# Patient Record
Sex: FEMALE | Race: WHITE | Employment: OTHER | URBAN - METROPOLITAN AREA
[De-identification: names, ages, dates, MRNs, and addresses within clinical notes are randomized per-mention and may not be internally consistent; named-entity substitution may affect disease eponyms.]

---

## 2017-01-12 ENCOUNTER — OFFICE VISIT (OUTPATIENT)
Dept: PULMONOLOGY | Age: 72
End: 2017-01-12

## 2017-01-12 VITALS
OXYGEN SATURATION: 95 % | DIASTOLIC BLOOD PRESSURE: 67 MMHG | RESPIRATION RATE: 16 BRPM | TEMPERATURE: 97.7 F | HEIGHT: 63 IN | BODY MASS INDEX: 32.43 KG/M2 | HEART RATE: 83 BPM | SYSTOLIC BLOOD PRESSURE: 110 MMHG | WEIGHT: 183 LBS

## 2017-01-12 DIAGNOSIS — J44.1 COPD WITH ACUTE EXACERBATION (HCC): Primary | ICD-10-CM

## 2017-01-12 DIAGNOSIS — R06.00 DYSPNEA, UNSPECIFIED TYPE: ICD-10-CM

## 2017-01-12 DIAGNOSIS — J40 TRACHEOBRONCHITIS: ICD-10-CM

## 2017-01-12 DIAGNOSIS — J44.9 COPD, VERY SEVERE (HCC): ICD-10-CM

## 2017-01-12 PROCEDURE — 1036F TOBACCO NON-USER: CPT | Performed by: INTERNAL MEDICINE

## 2017-01-12 PROCEDURE — 3017F COLORECTAL CA SCREEN DOC REV: CPT | Performed by: INTERNAL MEDICINE

## 2017-01-12 PROCEDURE — G8598 ASA/ANTIPLAT THER USED: HCPCS | Performed by: INTERNAL MEDICINE

## 2017-01-12 PROCEDURE — 99214 OFFICE O/P EST MOD 30 MIN: CPT | Performed by: INTERNAL MEDICINE

## 2017-01-12 PROCEDURE — 3014F SCREEN MAMMO DOC REV: CPT | Performed by: INTERNAL MEDICINE

## 2017-01-12 PROCEDURE — 1123F ACP DISCUSS/DSCN MKR DOCD: CPT | Performed by: INTERNAL MEDICINE

## 2017-01-12 PROCEDURE — 1090F PRES/ABSN URINE INCON ASSESS: CPT | Performed by: INTERNAL MEDICINE

## 2017-01-12 PROCEDURE — 3023F SPIROM DOC REV: CPT | Performed by: INTERNAL MEDICINE

## 2017-01-12 PROCEDURE — 4040F PNEUMOC VAC/ADMIN/RCVD: CPT | Performed by: INTERNAL MEDICINE

## 2017-01-12 PROCEDURE — G8427 DOCREV CUR MEDS BY ELIG CLIN: HCPCS | Performed by: INTERNAL MEDICINE

## 2017-01-12 PROCEDURE — G8484 FLU IMMUNIZE NO ADMIN: HCPCS | Performed by: INTERNAL MEDICINE

## 2017-01-12 PROCEDURE — G8400 PT W/DXA NO RESULTS DOC: HCPCS | Performed by: INTERNAL MEDICINE

## 2017-01-12 PROCEDURE — G8419 CALC BMI OUT NRM PARAM NOF/U: HCPCS | Performed by: INTERNAL MEDICINE

## 2017-01-12 PROCEDURE — G8926 SPIRO NO PERF OR DOC: HCPCS | Performed by: INTERNAL MEDICINE

## 2017-01-12 RX ORDER — AMOXICILLIN AND CLAVULANATE POTASSIUM 500; 125 MG/1; MG/1
1 TABLET, FILM COATED ORAL 2 TIMES DAILY
COMMUNITY
End: 2017-04-13 | Stop reason: ALTCHOICE

## 2017-01-12 RX ORDER — PREDNISONE 10 MG/1
TABLET ORAL
Qty: 30 TABLET | Refills: 0 | Status: SHIPPED | OUTPATIENT
Start: 2017-01-12 | End: 2017-01-22

## 2017-01-12 RX ORDER — UMECLIDINIUM 62.5 UG/1
AEROSOL, POWDER ORAL DAILY
Status: ON HOLD | COMMUNITY
Start: 2016-12-08 | End: 2020-07-22 | Stop reason: HOSPADM

## 2017-01-12 RX ORDER — PREDNISONE 20 MG/1
40 TABLET ORAL DAILY
Status: ON HOLD | COMMUNITY
End: 2020-07-22 | Stop reason: SDUPTHER

## 2017-01-12 RX ORDER — LEVOFLOXACIN 500 MG/1
500 TABLET, FILM COATED ORAL DAILY
Qty: 8 TABLET | Refills: 0 | Status: SHIPPED | OUTPATIENT
Start: 2017-01-12 | End: 2017-01-20

## 2017-04-13 ENCOUNTER — OFFICE VISIT (OUTPATIENT)
Dept: PULMONOLOGY | Age: 72
End: 2017-04-13

## 2017-04-13 VITALS
DIASTOLIC BLOOD PRESSURE: 80 MMHG | BODY MASS INDEX: 32.78 KG/M2 | TEMPERATURE: 97.7 F | RESPIRATION RATE: 18 BRPM | SYSTOLIC BLOOD PRESSURE: 130 MMHG | HEART RATE: 96 BPM | OXYGEN SATURATION: 94 % | HEIGHT: 63 IN | WEIGHT: 185 LBS

## 2017-04-13 DIAGNOSIS — R06.00 DYSPNEA, UNSPECIFIED TYPE: ICD-10-CM

## 2017-04-13 DIAGNOSIS — J44.1 COPD WITH ACUTE EXACERBATION (HCC): Primary | ICD-10-CM

## 2017-04-13 DIAGNOSIS — J40 TRACHEOBRONCHITIS: ICD-10-CM

## 2017-04-13 PROCEDURE — 3017F COLORECTAL CA SCREEN DOC REV: CPT | Performed by: INTERNAL MEDICINE

## 2017-04-13 PROCEDURE — G8926 SPIRO NO PERF OR DOC: HCPCS | Performed by: INTERNAL MEDICINE

## 2017-04-13 PROCEDURE — 99214 OFFICE O/P EST MOD 30 MIN: CPT | Performed by: INTERNAL MEDICINE

## 2017-04-13 PROCEDURE — 3023F SPIROM DOC REV: CPT | Performed by: INTERNAL MEDICINE

## 2017-04-13 PROCEDURE — 4040F PNEUMOC VAC/ADMIN/RCVD: CPT | Performed by: INTERNAL MEDICINE

## 2017-04-13 PROCEDURE — G8598 ASA/ANTIPLAT THER USED: HCPCS | Performed by: INTERNAL MEDICINE

## 2017-04-13 PROCEDURE — 3014F SCREEN MAMMO DOC REV: CPT | Performed by: INTERNAL MEDICINE

## 2017-04-13 PROCEDURE — 1123F ACP DISCUSS/DSCN MKR DOCD: CPT | Performed by: INTERNAL MEDICINE

## 2017-04-13 PROCEDURE — G8427 DOCREV CUR MEDS BY ELIG CLIN: HCPCS | Performed by: INTERNAL MEDICINE

## 2017-04-13 PROCEDURE — 1036F TOBACCO NON-USER: CPT | Performed by: INTERNAL MEDICINE

## 2017-04-13 PROCEDURE — G8417 CALC BMI ABV UP PARAM F/U: HCPCS | Performed by: INTERNAL MEDICINE

## 2017-04-13 PROCEDURE — 1090F PRES/ABSN URINE INCON ASSESS: CPT | Performed by: INTERNAL MEDICINE

## 2017-04-13 PROCEDURE — G8400 PT W/DXA NO RESULTS DOC: HCPCS | Performed by: INTERNAL MEDICINE

## 2017-04-13 RX ORDER — PREDNISONE 10 MG/1
TABLET ORAL
Qty: 30 TABLET | Refills: 0 | Status: SHIPPED | OUTPATIENT
Start: 2017-04-13 | End: 2017-04-23

## 2017-04-13 RX ORDER — DOXYCYCLINE 100 MG/1
100 CAPSULE ORAL 2 TIMES DAILY
Qty: 10 CAPSULE | Refills: 0 | Status: SHIPPED | OUTPATIENT
Start: 2017-04-13 | End: 2017-04-18

## 2017-04-13 RX ORDER — FLUTICASONE FUROATE AND VILANTEROL 200; 25 UG/1; UG/1
POWDER RESPIRATORY (INHALATION)
Qty: 1 EACH | Refills: 5 | Status: SHIPPED | OUTPATIENT
Start: 2017-04-13 | End: 2017-11-19 | Stop reason: SDUPTHER

## 2017-04-13 RX ORDER — IPRATROPIUM BROMIDE AND ALBUTEROL SULFATE 2.5; .5 MG/3ML; MG/3ML
1 SOLUTION RESPIRATORY (INHALATION) EVERY 4 HOURS PRN
Status: ON HOLD | COMMUNITY
End: 2020-07-22 | Stop reason: SDUPTHER

## 2017-10-19 ENCOUNTER — TELEPHONE (OUTPATIENT)
Dept: PULMONOLOGY | Age: 72
End: 2017-10-19

## 2017-11-07 ENCOUNTER — TELEPHONE (OUTPATIENT)
Dept: PULMONOLOGY | Age: 72
End: 2017-11-07

## 2018-01-03 RX ORDER — FLUTICASONE FUROATE AND VILANTEROL 200; 25 UG/1; UG/1
POWDER RESPIRATORY (INHALATION)
Qty: 3 EACH | Refills: 0 | Status: SHIPPED | OUTPATIENT
Start: 2018-01-03

## 2020-07-09 ENCOUNTER — APPOINTMENT (OUTPATIENT)
Dept: GENERAL RADIOLOGY | Age: 75
DRG: 885 | End: 2020-07-09
Payer: MEDICARE

## 2020-07-09 ENCOUNTER — APPOINTMENT (OUTPATIENT)
Dept: CT IMAGING | Age: 75
DRG: 885 | End: 2020-07-09
Payer: MEDICARE

## 2020-07-09 ENCOUNTER — HOSPITAL ENCOUNTER (INPATIENT)
Age: 75
LOS: 13 days | Discharge: NURSING, REHAB OR LONG-TERM CARE | DRG: 885 | End: 2020-07-22
Attending: EMERGENCY MEDICINE | Admitting: PSYCHIATRY & NEUROLOGY
Payer: MEDICARE

## 2020-07-09 PROBLEM — F32.9 MDD (MAJOR DEPRESSIVE DISORDER), SINGLE EPISODE: Status: ACTIVE | Noted: 2020-07-09

## 2020-07-09 LAB
A/G RATIO: 1.4 (ref 1.1–2.2)
ACETAMINOPHEN LEVEL: <5 UG/ML (ref 10–30)
ALBUMIN SERPL-MCNC: 4 G/DL (ref 3.4–5)
ALP BLD-CCNC: 87 U/L (ref 40–129)
ALT SERPL-CCNC: 21 U/L (ref 10–40)
AMMONIA: 33 UMOL/L (ref 11–51)
AMPHETAMINE SCREEN, URINE: ABNORMAL
ANION GAP SERPL CALCULATED.3IONS-SCNC: 16 MMOL/L (ref 3–16)
AST SERPL-CCNC: 24 U/L (ref 15–37)
BARBITURATE SCREEN URINE: ABNORMAL
BASOPHILS ABSOLUTE: 0.1 K/UL (ref 0–0.2)
BASOPHILS RELATIVE PERCENT: 1.1 %
BENZODIAZEPINE SCREEN, URINE: ABNORMAL
BILIRUB SERPL-MCNC: 0.3 MG/DL (ref 0–1)
BILIRUBIN URINE: NEGATIVE
BLOOD, URINE: NEGATIVE
BUN BLDV-MCNC: 13 MG/DL (ref 7–20)
CALCIUM SERPL-MCNC: 9.4 MG/DL (ref 8.3–10.6)
CANNABINOID SCREEN URINE: ABNORMAL
CHLORIDE BLD-SCNC: 93 MMOL/L (ref 99–110)
CLARITY: CLEAR
CO2: 34 MMOL/L (ref 21–32)
COCAINE METABOLITE SCREEN URINE: ABNORMAL
COLOR: YELLOW
CREAT SERPL-MCNC: 0.8 MG/DL (ref 0.6–1.2)
EOSINOPHILS ABSOLUTE: 0.2 K/UL (ref 0–0.6)
EOSINOPHILS RELATIVE PERCENT: 3 %
ETHANOL: NORMAL MG/DL (ref 0–0.08)
GFR AFRICAN AMERICAN: >60
GFR NON-AFRICAN AMERICAN: >60
GLOBULIN: 2.8 G/DL
GLUCOSE BLD-MCNC: 133 MG/DL (ref 70–99)
GLUCOSE URINE: NEGATIVE MG/DL
HCT VFR BLD CALC: 42.1 % (ref 36–48)
HEMOGLOBIN: 13.8 G/DL (ref 12–16)
KETONES, URINE: NEGATIVE MG/DL
LEUKOCYTE ESTERASE, URINE: NEGATIVE
LYMPHOCYTES ABSOLUTE: 2.6 K/UL (ref 1–5.1)
LYMPHOCYTES RELATIVE PERCENT: 36.5 %
Lab: ABNORMAL
MCH RBC QN AUTO: 28.4 PG (ref 26–34)
MCHC RBC AUTO-ENTMCNC: 32.7 G/DL (ref 31–36)
MCV RBC AUTO: 86.8 FL (ref 80–100)
METHADONE SCREEN, URINE: ABNORMAL
MICROSCOPIC EXAMINATION: NORMAL
MONOCYTES ABSOLUTE: 0.8 K/UL (ref 0–1.3)
MONOCYTES RELATIVE PERCENT: 11.1 %
NEUTROPHILS ABSOLUTE: 3.4 K/UL (ref 1.7–7.7)
NEUTROPHILS RELATIVE PERCENT: 48.3 %
NITRITE, URINE: NEGATIVE
OPIATE SCREEN URINE: POSITIVE
OXYCODONE URINE: ABNORMAL
PDW BLD-RTO: 14.2 % (ref 12.4–15.4)
PH UA: 5.5
PH UA: 5.5 (ref 5–8)
PHENCYCLIDINE SCREEN URINE: ABNORMAL
PLATELET # BLD: 377 K/UL (ref 135–450)
PMV BLD AUTO: 7 FL (ref 5–10.5)
POTASSIUM REFLEX MAGNESIUM: 4.5 MMOL/L (ref 3.5–5.1)
PROPOXYPHENE SCREEN: ABNORMAL
PROTEIN UA: NEGATIVE MG/DL
RBC # BLD: 4.85 M/UL (ref 4–5.2)
SALICYLATE, SERUM: <0.3 MG/DL (ref 15–30)
SODIUM BLD-SCNC: 143 MMOL/L (ref 136–145)
SPECIFIC GRAVITY UA: 1.01 (ref 1–1.03)
TOTAL PROTEIN: 6.8 G/DL (ref 6.4–8.2)
URINE REFLEX TO CULTURE: NORMAL
URINE TYPE: NORMAL
UROBILINOGEN, URINE: 0.2 E.U./DL
VALPROIC ACID LEVEL: <2.8 UG/ML (ref 50–100)
WBC # BLD: 7.2 K/UL (ref 4–11)

## 2020-07-09 PROCEDURE — 80198 ASSAY OF THEOPHYLLINE: CPT

## 2020-07-09 PROCEDURE — 82140 ASSAY OF AMMONIA: CPT

## 2020-07-09 PROCEDURE — 84443 ASSAY THYROID STIM HORMONE: CPT

## 2020-07-09 PROCEDURE — 36415 COLL VENOUS BLD VENIPUNCTURE: CPT

## 2020-07-09 PROCEDURE — G0480 DRUG TEST DEF 1-7 CLASSES: HCPCS

## 2020-07-09 PROCEDURE — 80164 ASSAY DIPROPYLACETIC ACD TOT: CPT

## 2020-07-09 PROCEDURE — 99285 EMERGENCY DEPT VISIT HI MDM: CPT

## 2020-07-09 PROCEDURE — U0002 COVID-19 LAB TEST NON-CDC: HCPCS

## 2020-07-09 PROCEDURE — 1240000000 HC EMOTIONAL WELLNESS R&B

## 2020-07-09 PROCEDURE — 71045 X-RAY EXAM CHEST 1 VIEW: CPT

## 2020-07-09 PROCEDURE — 85025 COMPLETE CBC W/AUTO DIFF WBC: CPT

## 2020-07-09 PROCEDURE — 81003 URINALYSIS AUTO W/O SCOPE: CPT

## 2020-07-09 PROCEDURE — 80053 COMPREHEN METABOLIC PANEL: CPT

## 2020-07-09 PROCEDURE — 80307 DRUG TEST PRSMV CHEM ANLYZR: CPT

## 2020-07-09 PROCEDURE — 70450 CT HEAD/BRAIN W/O DYE: CPT

## 2020-07-09 PROCEDURE — 6370000000 HC RX 637 (ALT 250 FOR IP): Performed by: PHYSICIAN ASSISTANT

## 2020-07-09 PROCEDURE — 93005 ELECTROCARDIOGRAM TRACING: CPT | Performed by: PHYSICIAN ASSISTANT

## 2020-07-09 RX ORDER — HYDROCODONE BITARTRATE AND ACETAMINOPHEN 5; 325 MG/1; MG/1
1 TABLET ORAL ONCE
Status: COMPLETED | OUTPATIENT
Start: 2020-07-09 | End: 2020-07-09

## 2020-07-09 RX ORDER — DULOXETIN HYDROCHLORIDE 30 MG/1
30 CAPSULE, DELAYED RELEASE ORAL NIGHTLY
Status: ON HOLD | COMMUNITY
End: 2020-07-22 | Stop reason: HOSPADM

## 2020-07-09 RX ORDER — ERYTHROMYCIN 250 MG/1
250 TABLET, DELAYED RELEASE ORAL NIGHTLY
Status: ON HOLD | COMMUNITY
End: 2020-07-22 | Stop reason: SDUPTHER

## 2020-07-09 RX ADMIN — HYDROCODONE BITARTRATE AND ACETAMINOPHEN 1 TABLET: 5; 325 TABLET ORAL at 20:03

## 2020-07-09 ASSESSMENT — PAIN SCALES - GENERAL: PAINLEVEL_OUTOF10: 8

## 2020-07-09 NOTE — ED NOTES
[address] [facility] to make sure they were consulting Mirella Mittal     [facility]  07/09/20 1804       Ruthie Chakraborty  07/09/20 7632

## 2020-07-09 NOTE — ED PROVIDER NOTES
on labs or imaging. She states she has chronic chest discomfort although no new changes today and no significant EKG findings. Story not suggestive of acute coronary syndrome or pulmonary embolism. At this time, she would benefit from further evaluation with psychiatry due to concern for severe depression. She was medically cleared. She was admitted to geriatric psychiatry.      Michelle Lucio MD  07/10/20 0035

## 2020-07-09 NOTE — ED NOTES
Pt off unit to radiology via stretcher. Dietary notified for dinner tray.  Michele Cancino RN  07/09/20 7135

## 2020-07-09 NOTE — ED PROVIDER NOTES
Department of Emergency Medicine   ED  Provider Note  Admit Date/RoomTime: 7/9/2020  5:09 PM  ED Room: YONI/NONE  Chief Complaint   Altered Mental Status (increased confusion x 6 months. Pt currently hospice services lives alone states she doesnt want to live anymore but does not want to harm self)    History of Present Illness   Source of history provided by:  patient and caregiver / friend. History/Exam Limitations: mental status. German Shah is a 70 y.o. old female who has a past medical history of:   Past Medical History:   Diagnosis Date    Arthritis     lower back, shoulder blades    Bipolar depression ([address]     CAD (coronary artery disease)     CHF (congestive heart failure) ([address]     COPD (chronic obstructive pulmonary disease) ([address]     Depression     Diabetes mellitus ([address]     Hyperlipidemia     Pneumonia     Thyroid disease     hypothyroidism    Unspecified cerebral artery occlusion with cerebral infarction     2006    presents to the emergency department via EMS for evaluation of a change in mental status patient has had increasing confusion including visual hallucinations, seeing a little boy named Yobani, chewing on oxygen tubing, chewing on her pill bottles, however resists taking her medications, because they \"make her sick\" for the past 6 months duration patient also has suicidal ideations but no suicidal plan. H/o self harm attempts, with past admission to [facility] facility. Patient has end-stage COPD and is on 3 L nasal cannula at baseline. She is a hospice patient, per report of the nurse who called report, unable to find further documentation of this. Pt lives alone, her sister lives next door. Quality:      Hopelessness:   Yes. Terror:   No.     Confusion:   Yes. Hallucinations:   visual  and auditory.      Able to care for self: No.  Able to control self: No.    ROS    Pertinent positives and negatives are stated within HPI, all other systems reviewed and are noted.  Neurological:  Oriented. Motor functions intact. Psychiatric:        Thought Process:       Coherent:  No.        Delusions / Paranoia: +. Flight of ideas:  No.         Rambling conversation:  Yes. Affect: sad , labile and tearful. Suicidal ideation:  suicidal ideation with unclear plan no intent. Homicidal ideation:  no homicidal ideation. Perceptions:  auditory and visual present. Insight: below average. Judgement: below average.     Lab / Imaging Results   (All laboratory and radiology results have been personally reviewed by myself)  Labs:  Results for orders placed or performed during the hospital encounter of 07/09/20   CBC Auto Differential   Result Value Ref Range    WBC 7.2 4.0 - 11.0 K/uL    RBC 4.85 4.00 - 5.20 M/uL    Hemoglobin 13.8 12.0 - 16.0 g/dL    Hematocrit 42.1 36.0 - 48.0 %    MCV 86.8 80.0 - 100.0 fL    MCH 28.4 26.0 - 34.0 pg    MCHC 32.7 31.0 - 36.0 g/dL    RDW 14.2 12.4 - 15.4 %    Platelets 328 282 - 876 K/uL    MPV 7.0 5.0 - 10.5 fL    Neutrophils % 48.3 %    Lymphocytes % 36.5 %    Monocytes % 11.1 %    Eosinophils % 3.0 %    Basophils % 1.1 %    Neutrophils Absolute 3.4 1.7 - 7.7 K/uL    Lymphocytes Absolute 2.6 1.0 - 5.1 K/uL    Monocytes Absolute 0.8 0.0 - 1.3 K/uL    Eosinophils Absolute 0.2 0.0 - 0.6 K/uL    Basophils Absolute 0.1 0.0 - 0.2 K/uL   Comprehensive Metabolic Panel w/ Reflex to MG   Result Value Ref Range    Sodium 143 136 - 145 mmol/L    Potassium reflex Magnesium 4.5 3.5 - 5.1 mmol/L    Chloride 93 (L) 99 - 110 mmol/L    CO2 34 (H) 21 - 32 mmol/L    Anion Gap 16 3 - 16    Glucose 133 (H) 70 - 99 mg/dL    BUN 13 7 - 20 mg/dL    CREATININE 0.8 0.6 - 1.2 mg/dL    GFR Non-African American >60 >60    GFR African American >60 >60    Calcium 9.4 8.3 - 10.6 mg/dL    Total Protein 6.8 6.4 - 8.2 g/dL    Alb 4.0 3.4 - 5.0 g/dL    Albumin/Globulin Ratio 1.4 1.1 - 2.2    Total Bilirubin 0.3 0.0 - 1.0 mg/dL    Alkaline degrees    T Axis 80 degrees    Diagnosis       Sinus tachycardiaOtherwise normal ECGWhen compared with ECG of 19-JUL-2016 05:43,No significant change was found     Imaging: All Radiology results interpreted by Radiologist unless otherwise noted. XR CHEST PORTABLE   Final Result   No acute cardiopulmonary disease. CT Head WO Contrast   Final Result   No acute intracranial abnormality. ED Course / Medical Decision Making     Medications   HYDROcodone-acetaminophen (NORCO) 5-325 MG per tablet 1 tablet (1 tablet Oral Given 7/9/20 2003)     ED Course as of Jul 10 0027   Thu Jul 09, 2020   1842 Notified, [facility]    [AR]   1914 Patient's labs returned urine drug screen is positive for opiates which patient is prescribed. Urine shows no evidence of infection. There is a no evidence of alcohol cyst in the late acetaminophen. Patient CO2 was elevated at 34 however likely due to chronic COPD. Notified [facility] patient was medically clear    [AR]      ED Course User Index  [AR] Juju Luna PA-C   Consult(s):   I    MDM:   I have performed a medical clearance examination on this patient. It is my opinion that no medical conditions were discovered that would preclude admission to a behavioral health unit or discharge home. I feel that the patient is medically stable for disposition by the behavioral health team at this time. Counseling:    ED Course as of Jul 10 0027   Thu Jul 09, 2020   1842 Notified, [facility]    [AR]   1914 Patient's labs returned urine drug screen is positive for opiates which patient is prescribed. Urine shows no evidence of infection. There is a no evidence of alcohol cyst in the late acetaminophen. Patient CO2 was elevated at 34 however likely due to chronic COPD.   Notified [facility] patient was medically clear    [AR]      ED Course User Index  [AR] Juju Luna PA-C      The emergency provider has spoken with the patient and discussed todays results, in addition to providing specific details for the plan of care and counseling regarding the diagnosis and prognosis. Questions are answered at this time and they are agreeable with the plan. Assessment      1. Hallucinations    2. Tearfulness    3. Chronic bilateral low back pain without sciatica    4. Depression, unspecified depression type      Plan   Admit to mental health unit - medically cleared for admission  Patient condition is stable    New Medications     New Prescriptions    No medications on file     Electronically signed by Suly Campo PA-C   DD: 7/9/20  **This report was transcribed using voice recognition software. Every effort was made to ensure accuracy; however, inadvertent computerized transcription errors may be present.   END OF ED PROVIDER NOTE        Suly Campo PA-C  07/10/20 0575

## 2020-07-10 LAB
BASE EXCESS ARTERIAL: 6.4 MMOL/L (ref -3–3)
CARBOXYHEMOGLOBIN ARTERIAL: 0.3 % (ref 0–1.5)
EKG ATRIAL RATE: 101 BPM
EKG DIAGNOSIS: NORMAL
EKG P AXIS: 78 DEGREES
EKG P-R INTERVAL: 156 MS
EKG Q-T INTERVAL: 354 MS
EKG QRS DURATION: 74 MS
EKG QTC CALCULATION (BAZETT): 459 MS
EKG R AXIS: 35 DEGREES
EKG T AXIS: 80 DEGREES
EKG VENTRICULAR RATE: 101 BPM
HCO3 ARTERIAL: 32.7 MMOL/L (ref 21–29)
HEMOGLOBIN, ART, EXTENDED: 14.1 G/DL (ref 12–16)
METHEMOGLOBIN ARTERIAL: 0.3 %
O2 CONTENT ARTERIAL: 20 ML/DL
O2 SAT, ARTERIAL: 97.9 %
O2 THERAPY: ABNORMAL
PCO2 ARTERIAL: 53.8 MMHG (ref 35–45)
PH ARTERIAL: 7.4 (ref 7.35–7.45)
PO2 ARTERIAL: 109.9 MMHG (ref 75–108)
SARS-COV-2, NAAT: NOT DETECTED
TCO2 ARTERIAL: 34.4 MMOL/L
THEOPHYLLINE LEVEL: <0.8 UG/ML (ref 8–20)
TSH SERPL DL<=0.05 MIU/L-ACNC: 2.42 UIU/ML (ref 0.27–4.2)
VALPROIC ACID LEVEL: <2.8 UG/ML (ref 50–100)

## 2020-07-10 PROCEDURE — 6370000000 HC RX 637 (ALT 250 FOR IP): Performed by: NURSE PRACTITIONER

## 2020-07-10 PROCEDURE — 36600 WITHDRAWAL OF ARTERIAL BLOOD: CPT

## 2020-07-10 PROCEDURE — 99223 1ST HOSP IP/OBS HIGH 75: CPT | Performed by: PSYCHIATRY & NEUROLOGY

## 2020-07-10 PROCEDURE — 93010 ELECTROCARDIOGRAM REPORT: CPT | Performed by: INTERNAL MEDICINE

## 2020-07-10 PROCEDURE — 6370000000 HC RX 637 (ALT 250 FOR IP): Performed by: PSYCHIATRY & NEUROLOGY

## 2020-07-10 PROCEDURE — 82803 BLOOD GASES ANY COMBINATION: CPT

## 2020-07-10 PROCEDURE — 99223 1ST HOSP IP/OBS HIGH 75: CPT | Performed by: PHYSICIAN ASSISTANT

## 2020-07-10 PROCEDURE — 1240000000 HC EMOTIONAL WELLNESS R&B

## 2020-07-10 RX ORDER — MAGNESIUM HYDROXIDE/ALUMINUM HYDROXICE/SIMETHICONE 120; 1200; 1200 MG/30ML; MG/30ML; MG/30ML
30 SUSPENSION ORAL EVERY 6 HOURS PRN
Status: DISCONTINUED | OUTPATIENT
Start: 2020-07-10 | End: 2020-07-22 | Stop reason: HOSPADM

## 2020-07-10 RX ORDER — DIVALPROEX SODIUM 250 MG/1
250 TABLET, DELAYED RELEASE ORAL 2 TIMES DAILY
Status: DISCONTINUED | OUTPATIENT
Start: 2020-07-10 | End: 2020-07-22 | Stop reason: HOSPADM

## 2020-07-10 RX ORDER — POTASSIUM CHLORIDE 750 MG/1
10 TABLET, EXTENDED RELEASE ORAL
Status: DISCONTINUED | OUTPATIENT
Start: 2020-07-11 | End: 2020-07-22 | Stop reason: HOSPADM

## 2020-07-10 RX ORDER — ERYTHROMYCIN 250 MG/1
500 TABLET, COATED ORAL NIGHTLY
Status: DISCONTINUED | OUTPATIENT
Start: 2020-07-10 | End: 2020-07-22 | Stop reason: HOSPADM

## 2020-07-10 RX ORDER — HYDROCODONE BITARTRATE AND ACETAMINOPHEN 10; 325 MG/1; MG/1
1 TABLET ORAL EVERY 6 HOURS PRN
Status: DISCONTINUED | OUTPATIENT
Start: 2020-07-10 | End: 2020-07-22 | Stop reason: HOSPADM

## 2020-07-10 RX ORDER — CLOPIDOGREL BISULFATE 75 MG/1
75 TABLET ORAL DAILY
Status: DISCONTINUED | OUTPATIENT
Start: 2020-07-10 | End: 2020-07-22 | Stop reason: HOSPADM

## 2020-07-10 RX ORDER — DOCUSATE SODIUM 100 MG/1
100 CAPSULE, LIQUID FILLED ORAL 2 TIMES DAILY
Status: DISCONTINUED | OUTPATIENT
Start: 2020-07-10 | End: 2020-07-22 | Stop reason: HOSPADM

## 2020-07-10 RX ORDER — HALOPERIDOL 5 MG/ML
2 INJECTION INTRAMUSCULAR EVERY 6 HOURS PRN
Status: DISCONTINUED | OUTPATIENT
Start: 2020-07-10 | End: 2020-07-22 | Stop reason: HOSPADM

## 2020-07-10 RX ORDER — LEVOTHYROXINE SODIUM 0.12 MG/1
125 TABLET ORAL DAILY
Status: DISCONTINUED | OUTPATIENT
Start: 2020-07-10 | End: 2020-07-22 | Stop reason: HOSPADM

## 2020-07-10 RX ORDER — LORAZEPAM 0.5 MG/1
0.5 TABLET ORAL EVERY 8 HOURS PRN
Status: DISCONTINUED | OUTPATIENT
Start: 2020-07-10 | End: 2020-07-22 | Stop reason: HOSPADM

## 2020-07-10 RX ORDER — QUETIAPINE FUMARATE 100 MG/1
100 TABLET, FILM COATED ORAL NIGHTLY
Status: DISCONTINUED | OUTPATIENT
Start: 2020-07-10 | End: 2020-07-13

## 2020-07-10 RX ORDER — COLCHICINE 0.6 MG/1
0.6 TABLET ORAL 2 TIMES DAILY
Status: ON HOLD | COMMUNITY
End: 2020-07-22 | Stop reason: HOSPADM

## 2020-07-10 RX ORDER — BUDESONIDE AND FORMOTEROL FUMARATE DIHYDRATE 160; 4.5 UG/1; UG/1
2 AEROSOL RESPIRATORY (INHALATION) 2 TIMES DAILY
Status: DISCONTINUED | OUTPATIENT
Start: 2020-07-10 | End: 2020-07-22 | Stop reason: HOSPADM

## 2020-07-10 RX ORDER — LORAZEPAM 2 MG/ML
1 INJECTION INTRAMUSCULAR EVERY 6 HOURS PRN
Status: DISCONTINUED | OUTPATIENT
Start: 2020-07-10 | End: 2020-07-10

## 2020-07-10 RX ORDER — LORAZEPAM 0.5 MG/1
0.5 TABLET ORAL EVERY 6 HOURS PRN
Status: DISCONTINUED | OUTPATIENT
Start: 2020-07-10 | End: 2020-07-10

## 2020-07-10 RX ORDER — PANTOPRAZOLE SODIUM 40 MG/1
40 TABLET, DELAYED RELEASE ORAL
Status: DISCONTINUED | OUTPATIENT
Start: 2020-07-11 | End: 2020-07-22 | Stop reason: HOSPADM

## 2020-07-10 RX ORDER — ACETAMINOPHEN 325 MG/1
650 TABLET ORAL EVERY 4 HOURS PRN
Status: DISCONTINUED | OUTPATIENT
Start: 2020-07-10 | End: 2020-07-22 | Stop reason: HOSPADM

## 2020-07-10 RX ORDER — FUROSEMIDE 20 MG/1
20 TABLET ORAL DAILY
Status: DISCONTINUED | OUTPATIENT
Start: 2020-07-10 | End: 2020-07-17

## 2020-07-10 RX ORDER — ALBUTEROL SULFATE 90 UG/1
2 AEROSOL, METERED RESPIRATORY (INHALATION) EVERY 6 HOURS PRN
Status: DISCONTINUED | OUTPATIENT
Start: 2020-07-10 | End: 2020-07-11

## 2020-07-10 RX ORDER — TRAZODONE HYDROCHLORIDE 50 MG/1
25 TABLET ORAL NIGHTLY PRN
Status: DISCONTINUED | OUTPATIENT
Start: 2020-07-10 | End: 2020-07-17

## 2020-07-10 RX ORDER — BENZTROPINE MESYLATE 1 MG/ML
1 INJECTION INTRAMUSCULAR; INTRAVENOUS 2 TIMES DAILY PRN
Status: DISCONTINUED | OUTPATIENT
Start: 2020-07-10 | End: 2020-07-22 | Stop reason: HOSPADM

## 2020-07-10 RX ORDER — LORAZEPAM 1 MG/1
1 TABLET ORAL EVERY 8 HOURS PRN
Status: ON HOLD | COMMUNITY
End: 2020-07-22 | Stop reason: SDUPTHER

## 2020-07-10 RX ORDER — PREDNISONE 1 MG/1
7.5 TABLET ORAL DAILY
Status: DISCONTINUED | OUTPATIENT
Start: 2020-07-10 | End: 2020-07-22 | Stop reason: HOSPADM

## 2020-07-10 RX ORDER — HALOPERIDOL 1 MG/1
2 TABLET ORAL EVERY 6 HOURS PRN
Status: DISCONTINUED | OUTPATIENT
Start: 2020-07-10 | End: 2020-07-22 | Stop reason: HOSPADM

## 2020-07-10 RX ADMIN — CLOPIDOGREL BISULFATE 75 MG: 75 TABLET ORAL at 15:35

## 2020-07-10 RX ADMIN — FUROSEMIDE 20 MG: 20 TABLET ORAL at 15:35

## 2020-07-10 RX ADMIN — LORAZEPAM 0.5 MG: 0.5 TABLET ORAL at 05:03

## 2020-07-10 RX ADMIN — TRAZODONE HYDROCHLORIDE 25 MG: 50 TABLET ORAL at 02:39

## 2020-07-10 RX ADMIN — DIVALPROEX SODIUM 250 MG: 250 TABLET, DELAYED RELEASE ORAL at 20:21

## 2020-07-10 RX ADMIN — HYDROCODONE BITARTRATE AND ACETAMINOPHEN 1 TABLET: 10; 325 TABLET ORAL at 15:34

## 2020-07-10 RX ADMIN — DIVALPROEX SODIUM 250 MG: 250 TABLET, DELAYED RELEASE ORAL at 15:35

## 2020-07-10 RX ADMIN — PREDNISONE 7.5 MG: 5 TABLET ORAL at 17:12

## 2020-07-10 RX ADMIN — TRAZODONE HYDROCHLORIDE 25 MG: 50 TABLET ORAL at 20:21

## 2020-07-10 RX ADMIN — QUETIAPINE FUMARATE 100 MG: 100 TABLET ORAL at 20:21

## 2020-07-10 RX ADMIN — ERYTHROMYCIN 500 MG: 250 TABLET, FILM COATED ORAL at 21:00

## 2020-07-10 RX ADMIN — DOCUSATE SODIUM 100 MG: 100 CAPSULE, LIQUID FILLED ORAL at 20:21

## 2020-07-10 RX ADMIN — ACETAMINOPHEN 650 MG: 325 TABLET ORAL at 20:21

## 2020-07-10 RX ADMIN — DOCUSATE SODIUM 100 MG: 100 CAPSULE, LIQUID FILLED ORAL at 15:35

## 2020-07-10 RX ADMIN — LEVOTHYROXINE SODIUM 125 MCG: 125 TABLET ORAL at 15:35

## 2020-07-10 RX ADMIN — ACETAMINOPHEN 650 MG: 325 TABLET ORAL at 05:02

## 2020-07-10 RX ADMIN — HALOPERIDOL 2 MG: 1 TABLET ORAL at 20:26

## 2020-07-10 ASSESSMENT — LIFESTYLE VARIABLES: HISTORY_ALCOHOL_USE: COMMENT

## 2020-07-10 ASSESSMENT — SLEEP AND FATIGUE QUESTIONNAIRES
RESTFUL SLEEP: NO
DIFFICULTY FALLING ASLEEP: YES
DO YOU HAVE DIFFICULTY SLEEPING: YES
DIFFICULTY STAYING ASLEEP: YES
DO YOU USE A SLEEP AID: YES
SLEEP PATTERN: DIFFICULTY FALLING ASLEEP;DISTURBED/INTERRUPTED SLEEP;INSOMNIA;RESTLESSNESS;DIFFICULTY ARISING
DIFFICULTY ARISING: YES

## 2020-07-10 ASSESSMENT — PAIN DESCRIPTION - LOCATION
LOCATION: BACK
LOCATION: BACK

## 2020-07-10 ASSESSMENT — PAIN DESCRIPTION - PAIN TYPE
TYPE: CHRONIC PAIN
TYPE: CHRONIC PAIN

## 2020-07-10 ASSESSMENT — PAIN SCALES - GENERAL
PAINLEVEL_OUTOF10: 0
PAINLEVEL_OUTOF10: 8
PAINLEVEL_OUTOF10: 3
PAINLEVEL_OUTOF10: 0
PAINLEVEL_OUTOF10: 7
PAINLEVEL_OUTOF10: 8
PAINLEVEL_OUTOF10: 8
PAINLEVEL_OUTOF10: 0
PAINLEVEL_OUTOF10: 3
PAINLEVEL_OUTOF10: 5

## 2020-07-10 ASSESSMENT — PAIN DESCRIPTION - ONSET
ONSET: ON-GOING
ONSET: ON-GOING

## 2020-07-10 ASSESSMENT — PAIN DESCRIPTION - FREQUENCY
FREQUENCY: INTERMITTENT
FREQUENCY: INTERMITTENT

## 2020-07-10 ASSESSMENT — PAIN DESCRIPTION - PROGRESSION
CLINICAL_PROGRESSION: GRADUALLY WORSENING
CLINICAL_PROGRESSION: GRADUALLY WORSENING

## 2020-07-10 ASSESSMENT — PAIN DESCRIPTION - DESCRIPTORS
DESCRIPTORS: CONSTANT
DESCRIPTORS: ACHING
DESCRIPTORS: ACHING

## 2020-07-10 ASSESSMENT — PAIN DESCRIPTION - ORIENTATION
ORIENTATION: LOWER
ORIENTATION: LOWER

## 2020-07-10 ASSESSMENT — PAIN - FUNCTIONAL ASSESSMENT: PAIN_FUNCTIONAL_ASSESSMENT: 0-10

## 2020-07-10 NOTE — ED NOTES
Presenting Problem: Depression    Appearance/Hygiene:  well-appearing, street clothes, seated in bed, fair grooming and fair hygiene   Motor Behavior: WNL   Attitude: Labile  Affect: depressed affect, anxious affect; labile  Speech: increased latency of response, pressured and expressive aphasia  Mood: anxious, constricted, depressed, labile and sad   Thought Processes: Blocking and Illogical  Perceptions: Present - States that she sees things and hears things at times, however, she is unwilling/unable to be more specific than this. Thought content: Patient is having great difficulty completing a thought and answer a question accurately. Becomes tearful and then will be unwilling/unable to speak for a few seconds. At times she is clear and able to answer a questions. She is unwilling/unable to state what we can do to help her the most at this time. States at times she wants to die because she is tired. Does not have a plan to end life, however, she is very depressed and sad. Feels hopeless and helpless in her current situation. Suicidal ideation:  no specific plan to harm self   Homicidal ideation:  none  Orientation: A&Ox2  Disoriented to date and situation. Memory: impaired immediate recall, recent memory and remote memory  Concentration: Poor    Insight/ judgement: impaired judgment, impaired insight, inability to do abstract thinking/problem solving. Psychosocial and contextual factors: Currently lives at home alone. She has hospice services and has staff that visit frequently to help with her ADL's and manage her care. She states that the nurse came to visit her today, however, when writer spoke with Chrystal Breen RN for [facility], states that patient did not have a visit today and last was seen by the  yesterday (7/8/2020) who stated in the charting that patient's speech was clear and focused, neither which is true today. Patient is O2 dependent.   She has 2 children listed in her patient chart. Writer attempted to contact patient's son/SANJEEV Padilla, no answer. Writer left message for son to return call. Spoke with on call RN, Stacey Dumont, with [facility]. States that plan is to discharge patient from their services due to non-contracted facility should patient be admitted. States should patient be admitted to the hospital, she would be able to resume hospice services upon discharge. C-SSRS Summary (including current and past suicidal ideation, plan, intent, and attempts) : Denies suicidal thoughts, however, states that she has frequent thoughts of wishing she could, \"just die\". States she wants to die because she is tired, but she does not wish to end her own life. Psychiatric History: Denies, however, has diagnosis listed in chart for bi-polar disorder. Patient reported diagnosis Patient unwilling/unable to state. Outpatient services/ Provider: Dr. Tari Drew; [facility]    Previous Inpatient Admissions( including location and dates if known): Unwilling/unable to state. Self-injurious/ Self-harm behavior: Denies    History of violence: Unable to assess. Current Substance use: Per on call [address], Stacey Dumont, patient has had medication concerns in the home. Per report, states patient hoards meds, takes wrong medications, etc.  Patient denies that she abuses meds, however, is tearful when asked if she is taking her medications correctly. Patient states, \"No\". She is unwilling/unable to expound on that question. Trauma identified: Unwilling/unable to state. Access to Firearms: Unwilling/unable to state.     ASSESSMENT FOR IMMINENT FUTURE DANGER:      RISK FACTORS:    [x]  Age <25 or >49   []  Male gender   [x]  Depressed mood   []  Active suicidal ideation   []  Suicide plan   []  Suicide attempt   []  Access to lethal means   []  Prior suicide attempt   []  Active substance abuse   []  Highly impulsive behaviors   [x]  Not attending to self-care/ADLs :  Patient

## 2020-07-10 NOTE — ED NOTES
Pt resting comfortably with eyes closed. Breathing is easy and unlabored.  Will continue to monitor pt condition     Danny Giordano, SRIDHAR  07/09/20 0388

## 2020-07-10 NOTE — BH NOTE
Patient currently admitted to [facility]. Psychiatric consult order discontinued.

## 2020-07-10 NOTE — BH NOTE
Pt arrived to the unit via wheelchair. Accompanied by security and [facility] staff. Belongings checked and secured. Pt placed on 3L O2 NC as this is what she said she uses at home. O2 sat 100% on O2. Assessments completed to best of ability but pt has expressive aphasia and has difficulty finding words, making interview difficult. She is tearful. Does endorse depression and not wanting to live but denies any plan for suicide and contracts for safety on the unit. She came to the ED from [facility] [place] due to severe depression and SI. States she is there because \"I am dying from COPD. \" Apparently hospice had to d/c her in order for her to come here because they are not contracted with this hospital. She has been up to the bathroom with a walker and SBA. Gait slow and unsteady. Continent of urine. Given Diet Pepsi. Declined food. Given PRN Trazodone @ 0562 due to having difficulty sleeping. Will continue to monitor.

## 2020-07-10 NOTE — GROUP NOTE
Group Therapy Note    Date: 7/10/2020    Group Start Time: 2:00 pm  Group End Time: 3:00 pm  Group Topic: Healthy Living/Wellness    [address] [facility]        Group Therapy Note    Attendees: 1       Patient's Goal: Pt will engage in game, Question Emerald Burton, as a way to socialize and utilize recall. Notes: Pt was unable to answer most questions stating, \"I can't remember. \" Pt became tearful and explained that she was frustrated with herself because she couldn't remember anything. SW offered supportive counseling, validating her feelings and assuring her that [facility] staff is here to assist her with her symptoms. Pt asked if SW would pray with her. Pt expressed gratitude after prayer. Status After Intervention:  Decompensated    Participation Level:  Active Listener and Interactive    Participation Quality: Sharing      Speech:  normal      Thought Process/Content: Linear      Affective Functioning: Flat      Mood: depressed      Level of consciousness:  Alert and Inattentive      Response to Learning: Resistant      Endings: None Reported    Modes of Intervention: Socialization and Confrontation      Discipline Responsible: /Counselor      Signature:  JUN Hughes

## 2020-07-10 NOTE — ED NOTES
Level of Care Disposition: Admit    Patient was seen by ED provider and [facility] staff. The case presented to psychiatric provider on-call, Elissa LEE. Based on the ED evaluation and information presented to the provider by Li Arciniega, it was determined that inpatient hospitalization is the least restrictive environment for the patient at this time. The patient will be admitted to the inpatient unit. Admitting provider did not order suicide precautions based on patient is not suicidal.    RATIONALE FOR ADMISSION:   Patient has a mental illness: Major Depression,   Patient at imminent risk of danger to self as demonstrated by Patient is unwilling/unable to provide for safe, self-care due to current mental health state. Patient at imminent risk of danger toward others demonstrated by   Patient has shown an inability to care for self demonstrated by unwilling/unable to demonstrate safe self care.   Patient is at imminent risk of violating their own rights or the rights of others demonstrated by inability to provide for own safe care  AND she could benefit from psychiatric treatment           Nancy Hartmann RN  07/09/20 4112

## 2020-07-10 NOTE — ED NOTES
Telephone call to patient's son/POA, Efrain Varghese. No answer, left vm to return call.      Loree Eden RN  07/10/20 0020
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
## 2020-07-10 NOTE — H&P
Hospital Medicine History & Physical      PCP: Johnathan Virgen MD    Date of Admission: 7/9/2020    Date of Service: Pt seen/examined on 7/10/2020     Chief Complaint:    Chief Complaint   Patient presents with    Altered Mental Status     increased confusion x 6 months. Pt currently hospice services lives alone states she doesnt want to live anymore but does not want to harm self         History Of Present Illness: The patient is a 70 y.o. female with a PMH of chronic ischemic heart disease, CAD, COPD, chronic hypoxic respiratory failure on 3L NC, DM Type II who presented to [facility] for worsening depression and hallucinations. Patient was seen and evaluated in the ED by the ED medical provider, patient was medically cleared for admission to [facility] at [facility]. This note serves as an admission medical H&P.   PCP: Dr. Lamine Fox  Tobacco Use: denies  EtOH Use: denies  Illicit Drug Use: denies    Pt denies any medical concerns at this time.     Past Medical History:        Diagnosis Date    Arthritis     lower back, shoulder blades    [facility] depression [facility]     CAD (coronary artery disease)     CHF (congestive heart failure) (HCC)     COPD [facility]chronic obstructive pulmonary disease) [facility]     Depression     Diabetes mellitus ([facility]     Hyperlipidemia     Pneumonia     Thyroid disease     hypothyroidism    Unspecified cerebral artery occlusion with cerebral infarction     2006       Past Surgical History:        Procedure Laterality Date    APPENDECTOMY      taken out with gallbladder    CARDIAC PACEMAKER PLACEMENT      CARDIAC SURGERY      pacemaker 2003    CARDIAC SURGERY      STENT    CHOLECYSTECTOMY      COLONOSCOPY      DIAGNOSTIC CARDIAC CATH LAB PROCEDURE      HYSTERECTOMY  1975    partial    OTHER SURGICAL HISTORY  5/28/13    quadroscopy    PACEMAKER INSERTION      PACEMAKER PLACEMENT      TONSILLECTOMY      TUBAL LIGATION      VASCULAR SURGERY      stent placement       Medications Prior to Admission:    Prior to Admission medications    Medication Sig Start Date End Date Taking? Authorizing Provider   LORazepam (ATIVAN) 1 MG tablet Take 1 mg by mouth every 8 hours as needed for Anxiety.    Yes Historical Provider, MD   HYDROcodone-acetaminophen (NORCO)  MG per tablet Take 1 tablet by mouth 4 times daily as needed for Pain    Yes Historical Provider, MD   colchicine (COLCRYS) 0.6 MG tablet Take 0.6 mg by mouth 2 times daily From Hospice, written as BID PRN    Historical Provider, MD   AMPHETAMINE ER PO Take 5 mg by mouth 2 times daily    Historical Provider, MD   DULoxetine (CYMBALTA) 30 MG extended release capsule Take 30 mg by mouth nightly    Historical Provider, MD   erythromycin (CORIE-TAB) 250 MG EC tablet Take 250 mg by mouth nightly    Historical Provider, MD   Fluticasone Furoate-Vilanterol (BREO ELLIPTA) 200-25 MCG/INH AEPB USE 1 INHALATION DAILY 1/3/18   Louis Espinoza MD   ipratropium-albuterol (DUONEB) 0.5-2.5 (3) MG/3ML SOLN nebulizer solution Inhale 1 vial into the lungs every 4 hours as needed for Shortness of Breath    Historical Provider, MD   INCRUSE ELLIPTA 62.5 MCG/INH AEPB daily 12/8/16   Historical Provider, MD   predniSONE (DELTASONE) 20 MG tablet Take 40 mg by mouth daily    Historical Provider, MD   Respiratory Therapy Supplies (NEBULIZER/TUBING/MOUTHPIECE) KIT 1 kit by Does not apply route daily as needed (DX COPD J44.9 HUGO 99) [facility] 9/1/16   Louis Espinoza MD   Nebulizers (COMPRESSOR/NEBULIZER) MISC 1 Device by Does not apply route as needed (DX COPD J44.9 HUGO 99) [facility] 9/1/16   Louis Espinoza MD   Cholecalciferol (VITAMIN D3) 2000 UNITS CAPS Take 1 capsule by mouth daily    Historical Provider, MD   traZODone (DESYREL) 100 MG tablet Take 100 mg by mouth nightly    Historical Provider, MD   promethazine (PHENERGAN) 25 MG tablet Take 25 mg by mouth every 6 hours as needed for Nausea    Historical Provider, MD   pregabalin (LYRICA) 75 MG capsule Take 75 mg by mouth 3 times daily as needed    Historical Provider, MD   polyethylene glycol (GLYCOLAX) packet Take 17 g by mouth daily as needed for Constipation    Historical Provider, MD   nitroGLYCERIN (NITROSTAT) 0.4 MG SL tablet Place 0.4 mg under the tongue every 5 minutes as needed for Chest pain    Historical Provider, MD   Multiple Vitamin (MULTI VITAMIN DAILY PO) Take 1 tablet by mouth daily    Historical Provider, MD   metFORMIN (GLUCOPHAGE) 500 MG tablet Take 500 mg by mouth 2 times daily (with meals)    Historical Provider, MD   magnesium oxide (MAG-OX) 400 MG tablet Take 400 mg by mouth daily    Historical Provider, MD   Loratadine 10 MG CAPS Take 1 tablet by mouth daily    Historical Provider, MD   lidocaine (LIDODERM) 5 % Place 1 patch onto the skin daily 12 hours on, 12 hours off. Historical Provider, MD   folic acid (FOLVITE) 084 MCG tablet Take 400 mcg by mouth daily    Historical Provider, MD   docusate sodium (COLACE) 100 MG capsule Take 100 mg by mouth nightly as needed for Constipation    Historical Provider, MD   diphenhydrAMINE (BENADRYL) 50 MG capsule Take 100 mg by mouth nightly as needed for Itching or Sleep     Historical Provider, MD   vitamin B-12 (CYANOCOBALAMIN) 1000 MCG tablet Take 1,000 mcg by mouth daily    Historical Provider, MD   donepezil (ARICEPT) 10 MG tablet Take 5 mg by mouth nightly     Historical Provider, MD   nystatin (MYCOSTATIN) 235712 UNIT/ML suspension Take 1,000,000 Units by mouth 2 times daily as needed    Historical Provider, MD   Calcium Carb-Cholecalciferol (CALCIUM 1000 + D PO) Take by mouth    Historical Provider, MD   Misc.  Devices (Laura Valencia) MISC by Does not apply route 4 times daily    Historical Provider, MD   guaiFENesin (MUCINEX) 600 MG SR tablet Take 1 tablet by mouth 2 times daily 8/2/15   Pako Davis MD   rOPINIRole (REQUIP) 2 MG tablet Take 2 mg by mouth nightly    Historical Provider, MD   celecoxib (CELEBREX) 100 MG capsule Take 100 mg by mouth daily Brother         lung cancer    Diabetes Brother     Heart Failure Brother     Hypertension Brother     Asthma Neg Hx        REVIEW OF SYSTEMS:     Constitutional: Negative for fever   HENT: Negative for sore throat   Eyes: Negative for redness   Respiratory: Negative  for dyspnea, cough   Cardiovascular: Negative for chest pain   Gastrointestinal: Negative for vomiting, diarrhea   Genitourinary: Negative for hematuria   Musculoskeletal: Negative for arthralgias   Skin: Negative for rash   Neurological: Negative for syncope   Hematological: Negative for adenopathy   Psychiatric/Behavorial: Negative for anxiety    PHYSICAL EXAM:    /69   Pulse 95   Temp 97.8 °F (36.6 °C)   Resp 18   Ht 5' 2\" (1.575 m)   Wt 175 lb (79.4 kg)   SpO2 98%   BMI 32.01 kg/m²   Gen: No distress. Alert. Elderly  female  Eyes: PERRL. No sclera icterus. No conjunctival injection. ENT: No discharge. Pharynx clear. Neck:  Trachea midline. Resp: No accessory muscle use. No crackles. No wheezes. No rhonchi. supp O2  CV: Regular rate. Regular rhythm. No murmur. No rub. No edema. GI: Soft, Non-tender. Non-distended. Normal bowel sounds. Skin: Warm and dry. No rash on exposed extremities. M/S: No cyanosis. No clubbing. Ambulates with walker. Neuro: Awake. Grossly nonfocal, CN II-XII intact    Psych: Oriented x 3. Defer to psychiatry.     CBC:   Recent Labs     07/09/20  1750   WBC 7.2   HGB 13.8   HCT 42.1   MCV 86.8        BMP:   Recent Labs     07/09/20  1750      K 4.5   CL 93*   CO2 34*   BUN 13   CREATININE 0.8     LIVER PROFILE:   Recent Labs     07/09/20  1750   AST 24   ALT 21   BILITOT 0.3   ALKPHOS 87     UA:  Recent Labs     07/09/20  1830   COLORU Yellow   PHUR 5.5  5.5   CLARITYU Clear   SPECGRAV 1.015   LEUKOCYTESUR Negative   UROBILINOGEN 0.2   BILIRUBINUR Negative   BLOODU Negative   GLUCOSEU Negative      U/A:    Lab Results   Component Value Date    NITRITE neg 05/27/2013    COLORU Yellow 07/09/2020    WBCUA 20-50 07/06/2017    RBCUA >100 07/06/2017    MUCUS Rare 07/06/2017    BACTERIA Rare 07/06/2017    CLARITYU Clear 07/09/2020    SPECGRAV 1.015 07/09/2020    LEUKOCYTESUR Negative 07/09/2020    BLOODU Negative 07/09/2020    GLUCOSEU Negative 07/09/2020       ABG    Lab Results   Component Value Date    AQW6NIR 32.7 07/10/2020    BEART 6.4 07/10/2020    B5ILGGQX 97.9 07/10/2020    PHART 7.402 07/10/2020    THGBART 13.0 05/27/2013    THGBART 12.2 01/10/2012    GPS9OMQ 53.8 07/10/2020    PO2ART 109.9 07/10/2020    WIL4XEL 34.4 07/10/2020       CULTURES  SARS-CoV-2: not detected    EKG:  I have reviewed the EKG with the following interpretation:   7/9/2020  Sinus tachycardia rate of 101  Otherwise normal ECG  When compared with ECG of 19-JUL-2016 05:43,  No significant change was found  Confirmed by Sandra Castillo MD, [address] ([id number]) on 7/10/2020 7:30:00 AM    RADIOLOGY    XR CHEST PORTABLE   Final Result   No acute cardiopulmonary disease. CT Head WO Contrast   Final Result   No acute intracranial abnormality. ASSESSMENT/PLAN:    Hallucinations  Depression  - cont mgmt per [facility]    COPD - no AE  Chronic Hypoxic Respiratory Failure  Chronic Steroid Use  - on 3L NC, stable, no c/o SOB  - cont Symbicort, cont prednisone, PRN albuterol    DM Type II  - well controlled  - cont Metformin  - daily POC glucose    GERD  - cont Protonix    Chronic Ischemic Heart Disease  - last echo from 2016 with normal EF  - appears compensated  - low Na diet, daily weights  - on Lasix w/ K supplements    SSS  - s/p pacemaker    Hypothyroidism  - home dose of synthroid 125 mcg      Chronic Pain with Opioids Dependence  - cont PRN Norco    Previously enrolled in hospice    Pt has no medical complaints at this time. Pt was informed that they may have Geriatric I contact us should any medical concerns arise during this admission.     Veronica Martínez PA-C 3:38 PM 7/10/2020

## 2020-07-10 NOTE — BH NOTE
Spoke with Neha Jackson RN at [facility] [phone] to attempt to verify meds.  She states she is unable to provide list at this time and will have one faxed over in the AM.

## 2020-07-10 NOTE — PROGRESS NOTES
Pt's son, Price Kearney, contacted the unit returning a call from a night shift nurse. Price Kearney was updated regarding his mom. He asked about visiting policies. Writer explained that our unit is currently not allowing visitors due to the coronavirus but he could call the unit to talk to his mom at anytime. Price Kearney verbalized understanding of this and said to contact him anytime w/ questions or for collateral. Stated the best number to reach him at is [phone].

## 2020-07-10 NOTE — ED NOTES
Telephone call to Jose A Duran RN at [facility] [place] to inform of patient's admission to [facility]. Verbalized understanding and states she will have the team reach out to [facility] in the A.M.        Jaclyn Delatorre RN  07/10/20 [id number]

## 2020-07-10 NOTE — BH NOTE
SOL called [facility] and spoke with the pt's CM, [facility]. [facility] reported that the pt is her own person an does not have a POA; however her son, Lazarus Graves, is the oldest living child. [facility] stated she has attempted to explain this to him several times; however he struggles to understand the difference. SOL inquired why the pt was referred to Psych for reporting the desire to die, as she is appears to be suffering & actively dying. [facility] reported that she disagrees with the idea that the pt is actively dying and feels that she could continue to be fairly independent for 6+ more months if they could get her medication sorted out. [facility] stated that they do not have psychatric services; however they do have a  and a rafal. [facility] reported that she was visiting with the pt on Tuesday and the pt very tearful and stating she wanted God to take her; however when assessed for suicide she denied a plan. On Thursday another nurse was with pt and reported the same behavior; however during the visit she turned off her oxygen multiple times; therefore as a result they felt it was safest to have her psychiatrically evaluated. SOL asked for clarification regarding the need to get the pt's medications straightened out. [facility] shared that the pt has a timed med set; however often will pick and choose what she wants to take. In addition, she has stated that she has been performing  narcotic counts the pt's medications are often off. She explained that is is unclear if the pt was taking more than prescribed because they would often found random pills and bottles hidden all over the house. [facility] stated it is believes that sometimes the pt was taking too many & sometimes she wasn't taking them at all but it couldn't really be verified. [facility] further explained that the pt had a niece, Ruthie,  living with her but she was recently banned from the property about 6 weeks because she was found nodding out in the parking lot. In addition, around the same time the pt was having an \"off day\" when she wasn't completely with it and openly told Malini Lopez she was saving the 20 pain pills in her bottle for her niece. As a a result of this, around 3 weeks ago, the pt's pain medication was decreased from 1.5 tablets every 4 hours to 1 tabet every 6 hours. Malini Lopez stated the pt's baseline pain level is always 6 out of 10; however her behaviors are different even when she is reporting the same number. Sometimes she can be a 6 and moaning & crying and other days she reports 6 and will be talkative, watching TV, and laughing. Per Malini Lopez, since they reduced the pt's pain medication she is reporting 7 out of 10 most days. In addition, Malini Lopez reported that they have started giving the pt her medication in blister packs. Malini Lopez stated at baseline the pt is 95% on, orientated x 4, can tell you what medications she is on, which medication is which etc. She stated the pt doesn't do much during the day except take her pain & anxiety medications and sleep; however she will watch TV at times. Malini Lopez stated she likes shows like Dr. Petra Stanley shows. Malini Lopez reported that she believes the pt can continue to live independently; however they have tried to get her to go to respite care to get her medication regime down but she refuses. In addition, they have discussed the option of moving in with her kids but she doesn't want to live with them. Malini Lopez stated that she believes the pt could be more independent in general; however she just doesn't have the gumption to do it. Malini Lopez  Stated that between [address] [facility] [address] the pt has a visits at least 5 times per week. Pt's COA worker has been working with pt for 8 years and her name is Jonhin. Malini Lopez reported the pt has 3 children, Charito Prater, and Idalia Ramos. Malini Lopez stated Delbert Sims and Idalia Ramos live on the other side of [facility] & do not see their mother that often.  Malini Lopez reported Carlos Alberto Cooper lives near by; however is estranged from the pt. Sanju Hendricks is the mother of Paulina Rubi ( whom was previously living with pt) and whom now has custody of Ruthie's kids. Per Osbaldo Hatch the pt has a lot of grief about this because Sanju Hendricks refuses to let the pt see her great grandchildren since April 2019. Osbaldo Hatch reported the pt has sister that lives in the next building over whom she gets along with but never wants her to be called in to provide support because she is fearful she will find out she has Narcotics & Ativan.          Radha, EDIES

## 2020-07-10 NOTE — BH NOTE
Pt complaining of pain in hip and back. Grimacing and can't get comfortable. Offered pt PRN Tylenol which she declined. Assisted pt with repositioning. States better. Will continue to monitor.

## 2020-07-10 NOTE — BH NOTE
Received fax from [facility] with pt's current medication list.  Contacted the nurse, Armen Rodriguez, who was taking care of Layla Barreto, to verify faxed information. Per Marquis Vaughan, pt had a change in mental status two weeks ago and was put on Erythromycin for possible UTI and remains prophetically. Patient was intermittently oriented, and at time had expressive aphasia. All medications verified, reflecting recent changes due to multiple, large quantity miscounts. Highlighted verification and any information from RN. Marquis Vaughan states patient receives medication thru dispenser, picks thru the ones she wants, and places the rest in random bottles. They are unsure of what she is and is not compliant with, regarding amount and frequency, and suspect misuse. Marquis Alexus also reports that pt should be monitoring her blood sugar, but she does not see evidence of that. She takes pt FS once a week, and generally runs in the 150s.

## 2020-07-10 NOTE — BH NOTE
4 Eyes Skin Assessment     The patient is being assess for  Admission    I agree that 2 RN's have performed a thorough Head to Toe Skin Assessment on the patient. ALL assessment sites listed below have been assessed. Areas assessed by both nurses:   [x]   Head, Face, and Ears   [x]   Shoulders, Back, and Chest  [x]   Arms, Elbows, and Hands   [x]   Coccyx, Sacrum, and Ischum  [x]   Legs, Feet, and Heels        Does the Patient have Skin Breakdown? Pt has pin sized abrasion to coccyx, unopened. Also has a small healing abrasion to left hand.           Angel Prevention initiated:  Yes   Wound Care Orders initiated:  Preventative Mepilex placed      WOC nurse consulted for Pressure Injury (Stage 3,4, Unstageable, DTI, NWPT, and Complex wounds):  NA      Nurse 1 eSignature: Electronically signed by Gabbi Palomo RN on 7/10/20 at 3:08 AM EDT    **SHARE this note so that the co-signing nurse is able to place an eSignature**    Nurse 2 eSignature: Electronically signed by Sandra Thorpe RN on 7/10/20 at 3:12 AM EDT

## 2020-07-10 NOTE — BH NOTE
Spoke to patient's sister, Renee Chen ([phone]), who lives next door to patient. Heather reports that patient has been established with hospice the past 1-2 years. She reports that the hospice took her off Vicodin in the past week (up to 9 pills/day) without a taper. Since then, she has become increasingly depressed and tearful. She has made statements of feeling lonely. Patient's sister states that the patient has not made any statements to her about self-harm or suicide and that she has asked patient outright about suicidal ideation in the past week, which the patient has denied. The patient has not verbalized any hallucinations per sister. She states that she thinks the patient's current presentation is due to withdrawal from the Vicodin. She does report that the patient has a psychiatric history with previous diagnoses of bipolar, severe depression, and \"different personalities\". She does have a history of 3-4 suicide attempts in her lifetime with the most recent being approximately 20 years ago. She does have a history of multiple psychiatric [facility], [facility] She has a history of seeing an outpatient psychiatrist but has not been seeing one recently. Heather reports that the patient had been doing \"fairly well\" prior to when they discontinued the Vicodin. Patient's sister notes that patient has son and two daughters. There was an argument with one of the daughter's and the patient has been unable to see her grandchildren for the past year. She also notes that the patient has not been sleeping well the past few nights.

## 2020-07-10 NOTE — H&P
Psychiatry Initial Evaluation    Admission Date:    7/9/2020    Chief complaint / Reason for Admission:  Suicidal ideation    HPI:   Patient is a 70 y.o. female with history of bipolar disorder admitted for suicidal ideation. Patient's history is convoluted. She was initially brought in by [facility] for [address] [place] [address] called by her home hospice nurse. Per ED documentation, patient was experiencing VH and bizarre behaviors, such as chewing on her O2 tubing and medication bottles, and seeing \"a little boy she was calling Yobani. \"  At some point she made a suicidal statement, something along the lines of \"I can't go on anymore,\" which then prompted her psychiatric assessment. ED evaluation didn't reveal any acute processes, though she did not have an ABG performed despite having end stage COPD with baseline 3L O2 requirement. This morning, patient appears indeed to be altered. She is alert and oriented to self, \"hospital,\" though not specific hospital, day, month, and year, but not date. However, she was highly inattentive, and struggled to complete her thoughts, frequently trailing off of the end of sentences, or completing sentences with completely unrelated thoughts. She required questions be asked repeatedly, and responded frequently by saying \"I don't know. \"  Patient did confirm that she sometimes wishes she would die, and went on to reference a combination of factors including her severe medical conditions, the fact that she fears a slow, painful death and would like to \"get it over with,\" as well as social isolation, and an estranged relationship with her granddaughter that has prevented her from seeing her great grandchildren for the past year. She denies, however, active suicidal ideation. Patient struggled significant to detail her past history. She couldn't name any of her medications, and couldn't ID them even when prompted with their names.   The only concern she was able to articulate coherently was that she has been struggling to sleep at night. I spoke with a CM at her hospice. Patient has actually been in hospice for 2.5 years. She has had major ups and downs in her medical stability. On several occasions they have thought her capable of discharge from services only for her to decline precipitously. CM notes that patient has been increasingly disoriented, forgetful, and odd over the past month. The only medication change that has occurred just before this was a decrease in her norco from q4h prn to q6h prn because patient's pill counts didn't match up and there was concern for diversion by her niece. Patient has recently been started on prophylactic erythromycin as well due to concerns that a persistent UTI might be causative. Did not have any head imaging prior to CT performed yesterday. Hospice has significant concerns about how patient is taking medications. She frequently seems to take medication selectively, and regularly her pill counts are off. She will margo certain medications and they suspect take them all at once. It has been suggested that she participate in a respite stay in the past to straighten out her medications, but she has consistently refused. Duration: 1 month  Severity: Severe  Context: as above  Associated symptoms: as above    Past Psychiatric History:    Patient has a history of bipolar disorder. She has been hospitalized several times, and has a history of 3-4 suicide attempts, per her sister. Patient has never, however, seen an outpatient psychiatrist.  She is currently prescribed depakote, and has been on this agent for years, but is on a very low dose, only 250 mg daily. She was recently prescribed quetiapine, but per RN CM, she would refuse to take this medication so it was discontinued.   Finally, she is prescribed prn lorazepam.    Past Medical History:  Past Medical History:   Diagnosis Date    Arthritis     lower back, shoulder blades    [facility] [address]  CAD (coronary artery disease)     [facility]est[facility]     COPD (chronic obstructive pulmonary disease) ([facility])     Depression     Diabetes mellitus ([facility] [address]     Hyperlipidemia     Pneumonia     Thyroid disease     hypothyroidism    Unspecified cerebral artery occlusion with cerebral infarction     2006       Home Medications:  Prior to Admission medications    Medication Sig Start Date End Date Taking? Authorizing Provider   LORazepam (ATIVAN) 1 MG tablet Take 1 mg by mouth every 8 hours as needed for Anxiety.    Yes Historical Provider, MD   HYDROcodone-acetaminophen (NORCO)  MG per tablet Take 1 tablet by mouth 4 times daily as needed for Pain    Yes Historical Provider, MD   colchicine (COLCRYS) 0.6 MG tablet Take 0.6 mg by mouth 2 times daily From Hospice, written as BID PRN    Historical Provider, MD   AMPHETAMINE ER PO Take 5 mg by mouth 2 times daily    Historical Provider, MD   DULoxetine (CYMBALTA) 30 MG extended release capsule Take 30 mg by mouth nightly    Historical Provider, MD   erythromycin (CORIE-TAB) 250 MG EC tablet Take 250 mg by mouth nightly    Historical Provider, MD   Fluticasone Furoate-Vilanterol (BREO ELLIPTA) 200-25 MCG/INH AEPB USE 1 INHALATION DAILY 1/3/18   Paulette Vizcarra MD   ipratropium-albuterol (DUONEB) 0.5-2.5 (3) MG/3ML SOLN nebulizer solution Inhale 1 vial into the lungs every 4 hours as needed for Shortness of Breath    Historical Provider, MD   INCRUSE ELLIPTA 62.5 MCG/INH AEPB daily 12/8/16   Historical Provider, MD   predniSONE (DELTASONE) 20 MG tablet Take 40 mg by mouth daily    Historical Provider, MD   Respiratory Therapy Supplies (NEBULIZER/TUBING/MOUTHPIECE) KIT 1 kit by Does not apply route daily as needed (DX COPD J44.9 HUGO 99) [facility] 9/1/16   Paulette Vizcarra MD   Nebulizers (COMPRESSOR/NEBULIZER) MISC 1 Device by Does not apply route as needed (DX COPD J44.9 HUGO 99) [facility] 9/1/16   Paulette Vizcarra MD   Cholecalciferol (VITAMIN D3) 2000 UNITS CAPS Take 1 capsule by mouth daily    Historical Provider, MD   traZODone (DESYREL) 100 MG tablet Take 100 mg by mouth nightly    Historical Provider, MD   promethazine (PHENERGAN) 25 MG tablet Take 25 mg by mouth every 6 hours as needed for Nausea    Historical Provider, MD   pregabalin (LYRICA) 75 MG capsule Take 75 mg by mouth 3 times daily as needed    Historical Provider, MD   polyethylene glycol (GLYCOLAX) packet Take 17 g by mouth daily as needed for Constipation    Historical Provider, MD   nitroGLYCERIN (NITROSTAT) 0.4 MG SL tablet Place 0.4 mg under the tongue every 5 minutes as needed for Chest pain    Historical Provider, MD   Multiple Vitamin (MULTI VITAMIN DAILY PO) Take 1 tablet by mouth daily    Historical Provider, MD   metFORMIN (GLUCOPHAGE) 500 MG tablet Take 500 mg by mouth 2 times daily (with meals)    Historical Provider, MD   magnesium oxide (MAG-OX) 400 MG tablet Take 400 mg by mouth daily    Historical Provider, MD   Loratadine 10 MG CAPS Take 1 tablet by mouth daily    Historical Provider, MD   lidocaine (LIDODERM) 5 % Place 1 patch onto the skin daily 12 hours on, 12 hours off. Historical Provider, MD   folic acid (FOLVITE) 285 MCG tablet Take 400 mcg by mouth daily    Historical Provider, MD   docusate sodium (COLACE) 100 MG capsule Take 100 mg by mouth nightly as needed for Constipation    Historical Provider, MD   diphenhydrAMINE (BENADRYL) 50 MG capsule Take 100 mg by mouth nightly as needed for Itching or Sleep     Historical Provider, MD   vitamin B-12 (CYANOCOBALAMIN) 1000 MCG tablet Take 1,000 mcg by mouth daily    Historical Provider, MD   donepezil (ARICEPT) 10 MG tablet Take 5 mg by mouth nightly     Historical Provider, MD   nystatin (MYCOSTATIN) 766236 UNIT/ML suspension Take 1,000,000 Units by mouth 2 times daily as needed    Historical Provider, MD   Calcium Carb-Cholecalciferol (CALCIUM 1000 + D PO) Take by mouth    Historical Provider, MD   Misc.  Devices (Armond Schwab) MISC by Does not apply route 4 times daily    Historical Provider, MD   guaiFENesin (MUCINEX) 600 MG SR tablet Take 1 tablet by mouth 2 times daily 8/2/15   Shravan Higgins MD   rOPINIRole (REQUIP) 2 MG tablet Take 2 mg by mouth nightly    Historical Provider, MD   celecoxib (CELEBREX) 100 MG capsule Take 100 mg by mouth daily    Historical Provider, MD   QUEtiapine (SEROQUEL) 200 MG tablet Take 250 mg by mouth nightly. Historical Provider, MD   levothyroxine (SYNTHROID) 112 MCG tablet Take 112 mcg by mouth daily. Historical Provider, MD   atorvastatin (LIPITOR) 80 MG tablet Take 80 mg by mouth daily. Historical Provider, MD   fluticasone (FLONASE) 50 MCG/ACT nasal spray 2 sprays by Nasal route daily. Patient taking differently: 1 spray by Nasal route daily  2/9/12   Tatyana Mcneil MD   albuterol (PROVENTIL;VENTOLIN) 90 MCG/ACT inhaler Inhale 2 puffs into the lungs every 6 hours as needed for Wheezing or Shortness of Breath. 2/9/12   Tatyana Mcneil MD   esomeprazole (NEXIUM) 40 MG capsule Take 40 mg by mouth every morning (before breakfast). Historical Provider, MD   aspirin 81 MG EC tablet Take 81 mg by mouth daily. Historical Provider, MD   furosemide (LASIX) 40 MG tablet Take 20 mg by mouth daily     Historical Provider, MD   clopidogrel (PLAVIX) 75 MG tablet Take 75 mg by mouth daily. Historical Provider, MD   potassium chloride SA (K-DUR;KLOR-CON) 20 MEQ tablet Take 10 mEq by mouth 2 times daily     Historical Provider, MD   theophylline CR, 12 hour, (THEOPHYLLINE CR, 12 HOUR,) 200 MG CR tablet Take 200 mg by mouth 2 times daily. Historical Provider, MD   divalproex (DEPAKOTE) 250 MG EC tablet Take 250 mg by mouth once.     Historical Provider, MD     Family Hx:    Family History   Problem Relation Age of Onset    Diabetes Mother     Hypertension Mother     Cancer Father         lung cancer    Emphysema Father     Cancer Sister         lung cancer    Diabetes Sister  Heart Failure Sister     Cancer Brother         lung cancer    Diabetes Brother     Heart Failure Brother     Hypertension Brother     Asthma Neg Hx      Social Hx:   Social History     Socioeconomic History    Marital status: Single     Spouse name: None    Number of children: None    Years of education: None    Highest education level: None   Occupational History    None   Social Needs    Financial resource strain: None    Food insecurity     Worry: None     Inability: None    Transportation needs     Medical: None     Non-medical: None   Tobacco Use    Smoking status: Former Smoker     Packs/day: 3.00     Years: 47.00     Pack years: 141.00     Last attempt to quit: 2007     Years since quittin.5    Smokeless tobacco: Never Used   Substance and Sexual Activity    Alcohol use: No     Alcohol/week: 0.0 standard drinks    Drug use: No    Sexual activity: Never     Partners: Male   Lifestyle    Physical activity     Days per week: None     Minutes per session: None    Stress: None   Relationships    Social connections     Talks on phone: None     Gets together: None     Attends [employer] service: None     Active member of club or organization: None     Attends meetings of clubs or organizations: None     Relationship status: None    Intimate partner violence     Fear of current or ex partner: None     Emotionally abused: None     Physically abused: None     Forced sexual activity: None   Other Topics Concern    None   Social History Narrative    None       Current Medications Ordered:   clopidogrel  75 mg Oral Daily    divalproex  250 mg Oral BID    erythromycin base  500 mg Oral Nightly    [START ON 2020] pantoprazole  40 mg Oral QAM AC    docusate sodium  100 mg Oral BID    budesonide-formoterol  2 puff Inhalation BID    furosemide  20 mg Oral Daily    levothyroxine  125 mcg Oral Daily    [START ON 2020] metFORMIN  1,000 mg Oral Daily with breakfast    [START ON 7/11/2020] potassium chloride  10 mEq Oral Daily with breakfast    predniSONE  7.5 mg Oral Daily    QUEtiapine  100 mg Oral Nightly      PRN Meds: acetaminophen, LORazepam **OR** LORazepam, haloperidol lactate **OR** haloperidol, traZODone, benztropine mesylate, magnesium hydroxide, aluminum & magnesium hydroxide-simethicone, albuterol sulfate HFA, HYDROcodone-acetaminophen     ROS:    Psychiatric: + for VH, disorientation, suicidal ideation ; Negative for HI  All other systems were reviewed and negative except as previously documented in HPI.     PE:    /69   Pulse 95   Temp 97.8 °F (36.6 °C)   Resp 18   Ht 5' 2\" (1.575 m)   Wt 175 lb (79.4 kg)   SpO2 98%   BMI 32.01 kg/m²       Motor / Gait: psychomotor retardation, nml tone, no involuntary movements, gait deferred    Mental Status Examination:    Appearance: WF, appears stated age, wearing hospital gown, disheveled grooming and hygiene  Behavior/Attitude toward examiner:  Cooperative, but inattentive, limited EC  Speech:  Halting, +word finding difficulty  Mood:  \"Not good\"  Affect:  Labile  Thought processes:  +derailment, tangential, vague  Thought Content: +passive SI, denies HI, no delusions voiced, no obsessions, +confabulation  Perceptions: Denies active AVH, not actively  RTIS  Attention: Impaired  Abstraction: [place]  Cognition: Average AYDE, Alert and oriented to person, place, time, and vaguely situation, recall impaired  Insight: Minimal insight   Judgment: Impaired judgment      LAB:   Admission on 07/09/2020   Component Date Value Ref Range Status    WBC 07/09/2020 7.2  4.0 - 11.0 K/uL Final    RBC 07/09/2020 4.85  4.00 - 5.20 M/uL Final    Hemoglobin 07/09/2020 13.8  12.0 - 16.0 g/dL Final    Hematocrit 07/09/2020 42.1  36.0 - 48.0 % Final    MCV 07/09/2020 86.8  80.0 - 100.0 fL Final    MCH 07/09/2020 28.4  26.0 - 34.0 pg Final    MCHC 07/09/2020 32.7  31.0 - 36.0 g/dL Final    RDW 07/09/2020 14.2  12.4 - 15.4 % Final    Platelets 13/43/6821 377  135 - 450 K/uL Final    MPV 07/09/2020 7.0  5.0 - 10.5 fL Final    Neutrophils % 07/09/2020 48.3  % Final    Lymphocytes % 07/09/2020 36.5  % Final    Monocytes % 07/09/2020 11.1  % Final    Eosinophils % 07/09/2020 3.0  % Final    Basophils % 07/09/2020 1.1  % Final    Neutrophils Absolute 07/09/2020 3.4  1.7 - 7.7 K/uL Final    Lymphocytes Absolute 07/09/2020 2.6  1.0 - 5.1 K/uL Final    Monocytes Absolute 07/09/2020 0.8  0.0 - 1.3 K/uL Final    Eosinophils Absolute 07/09/2020 0.2  0.0 - 0.6 K/uL Final    Basophils Absolute 07/09/2020 0.1  0.0 - 0.2 K/uL Final    Sodium 07/09/2020 143  136 - 145 mmol/L Final    Potassium reflex Magnesium 07/09/2020 4.5  3.5 - 5.1 mmol/L Final    Chloride 07/09/2020 93* 99 - 110 mmol/L Final    CO2 07/09/2020 34* 21 - 32 mmol/L Final    Anion Gap 07/09/2020 16  3 - 16 Final    Glucose 07/09/2020 133* 70 - 99 mg/dL Final    BUN 07/09/2020 13  7 - 20 mg/dL Final    CREATININE 07/09/2020 0.8  0.6 - 1.2 mg/dL Final    GFR Non- 07/09/2020 >60  >60 Final    Comment: >60 mL/min/1.73m2 EGFR, calc. for ages 25 and older using the  MDRD formula (not corrected for weight), is valid for stable  renal function.  GFR  07/09/2020 >60  >60 Final    Comment: Chronic Kidney Disease: less than 60 ml/min/1.73 sq.m. Kidney Failure: less than 15 ml/min/1.73 sq.m. Results valid for patients 18 years and older.  Calcium 07/09/2020 9.4  8.3 - 10.6 mg/dL Final    Total Protein 07/09/2020 6.8  6.4 - 8.2 g/dL Final    Alb 07/09/2020 4.0  3.4 - 5.0 g/dL Final    Albumin/Globulin Ratio 07/09/2020 1.4  1.1 - 2.2 Final    Total Bilirubin 07/09/2020 0.3  0.0 - 1.0 mg/dL Final    Alkaline Phosphatase 07/09/2020 87  40 - 129 U/L Final    ALT 07/09/2020 21  10 - 40 U/L Final    AST 07/09/2020 24  15 - 37 U/L Final    Comment: Specimen hemolysis has exceeded the interference as defined by Roche.   Value may be falsely increased. Suggest reorder and recollection if  clinically indicated.  Globulin 07/09/2020 2.8  g/dL Final    Acetaminophen Level 07/09/2020 <5* 10 - 30 ug/mL Final    Comment: Therapeutic Range: 10.0-30.0 ug/mL  Toxic: >=390 ug/mL      Salicylate, Serum 46/85/8275 <0.3* 15.0 - 30.0 mg/dL Final    Comment: Therapeutic Range: 15.0-30.0 mg/dL  Toxic: >30.0 mg/dL      Ethanol Lvl 07/09/2020 None Detected  mg/dL Final    Comment:    None Detected  Conversion factor:  100 mg/dl = .100 g/dl  For Medical Purposes Only      Amphetamine Screen, Urine 07/09/2020 Neg  Negative <1000ng/mL Final    Barbiturate Screen, Ur 07/09/2020 Neg  Negative <200 ng/mL Final    Benzodiazepine Screen, Urine 07/09/2020 Neg  Negative <200 ng/mL Final    Cannabinoid Scrn, Ur 07/09/2020 Neg  Negative <50 ng/mL Final    Cocaine Metabolite Screen, Urine 07/09/2020 Neg  Negative <300 ng/mL Final    Opiate Scrn, Ur 07/09/2020 POSITIVE* Negative <300 ng/mL Final    Comment: \"Therapeutic levels of pain medication, especially oxycontin and synthetic  opioids, may not be detected by this Methodology. Pain management screen  panel  Drug panel-PM-Hi Res Ur, Interp (PAIN) should be considered for drug  monitoring \".  PCP Screen, Urine 07/09/2020 Neg  Negative <25 ng/mL Final    Methadone Screen, Urine 07/09/2020 Neg  Negative <300 ng/mL Final    Propoxyphene Scrn, Ur 07/09/2020 Neg  Negative <300 ng/mL Final    Oxycodone Urine 07/09/2020 Neg  Negative <100 ng/ml Final    pH, UA 07/09/2020 5.5   Final    Comment: Urine pH less than 5.0 or greater than 8.0 may indicate sample adulteration. Another sample should be collected if clinically  indicated.  Drug Screen Comment: 07/09/2020 see below   Final    Comment: This method is a screening test to detect only these drug  classes as part of a medical workup. Confirmatory testing  by another method should be ordered if clinically indicated.       Color, UA 07/09/2020 Yellow  Straw/Yellow Final    Clarity, UA 07/09/2020 Clear  Clear Final    Glucose, Ur 07/09/2020 Negative  Negative mg/dL Final    Bilirubin Urine 07/09/2020 Negative  Negative Final    Ketones, Urine 07/09/2020 Negative  Negative mg/dL Final    Specific [place], UA 07/09/2020 1.015  1.005 - 1.030 Final    Blood, Urine 07/09/2020 Negative  Negative Final    pH, UA 07/09/2020 5.5  5.0 - 8.0 Final    Protein, UA 07/09/2020 Negative  Negative mg/dL Final    Urobilinogen, Urine 07/09/2020 0.2  <2.0 E.U./dL Final    Nitrite, Urine 07/09/2020 Negative  Negative Final    Leukocyte Esterase, Urine 07/09/2020 Negative  Negative Final    Microscopic Examination 07/09/2020 Not Indicated   Final    Urine Type 07/09/2020 NotGiven   Final    Urine Reflex to Culture 07/09/2020 Not Indicated   Final    Ammonia 07/09/2020 33  11 - 51 umol/L Final    Valproic Acid Lvl 07/09/2020 <2.8* 50.0 - 100.0 ug/mL Final    Ventricular Rate 07/09/2020 101  BPM Final    Atrial Rate 07/09/2020 101  BPM Final    P-R Interval 07/09/2020 156  ms Final    QRS Duration 07/09/2020 74  ms Final    Q-T Interval 07/09/2020 354  ms Final    QTc Calculation (Bazett) 07/09/2020 459  ms Final    P Axis 07/09/2020 78  degrees Final    R Axis 07/09/2020 35  degrees Final    T Axis 07/09/2020 80  degrees Final    Diagnosis 07/09/2020 Sinus tachycardiaOtherwise normal ECGWhen compared with ECG of 19-JUL-2016 05:43,No significant change was foundConfirmed by Shamar Tubbs MD, [address] ([id number]) on 7/10/2020 7:30:00 AM   Final    Theophylline Lvl 07/09/2020 <0.8* 8.0 - 20.0 ug/mL Final    Valproic Acid Lvl 07/09/2020 <2.8* 50.0 - 100.0 ug/mL Final    SARS-CoV-2, NAAT 07/09/2020 Not Detected  Not Detected Final    Comment: Rapid NAAT:   Negative results should be treated as presumptive and,  if inconsistent with clinical signs and symptoms or necessary for  patient management, should be tested with an alternative molecular  assay.  Negative results do Spent > 70 minutes face to face with patient of which >50% was spent counseling and providing education regarding diagnosis, treatment options, and prognosis.     Ernst Fleming MD  Staff Psychiatrist

## 2020-07-10 NOTE — BH NOTE
Unit received phone call from sister Timothy Santorointosh, she states pt experienced a change in mental status occurred when pt's Vicodin was stopped and she started on Ativan.

## 2020-07-10 NOTE — FLOWSHEET NOTE
07/10/20 1547   Activities of Daily Living   Patient Requires assistance with daily self-care activities? Yes   Patient identified needing assistance with: Bathing;Laundry;Dressing;Money Management;Transportation;Housecleaning;Grocery Shopping;Meal Planning; Food Preparation   Details Per chart review, Keshav receives support from [facility], who see her five days a week. Person Assisting  or Other professional caregiver   Person Assisting details Per chart review, [place] [facility]   Leisure Activity 1   3 Favorite Leisure Activities \"I don't know. \"   Frequency   (DANDRE)   Last time   (DANDRE)   Barriers to participating    (DANDRE)   Leisure Activity 2   [address]  \"I don't know. \"   Frequency    (DANDRE)   Last time    (DANDRE)   Barriers to participating    (DANDRE)   Leisure Activity 3   Favorite Leisure Activities  \"I don't know. \"   Frequency    (DANDRE)   Last time    (DANDRE)   Barriers to participating    (DANDRE)   Social   Patient reports spending the majority of their free time alone   Patient verbalizes a preference for spending free time alone   Patients perception of support system none  (\"Nobody. \")   Patients perception of barriers to socializing with others include(s) lack of comfort;lack of motivation/interest;transportation   Social Details \"I don't really particularly care about people. \"   Beliefs & Coping   Has difficulty dealing with feelings   Yes   Internalizes feelings/Keeps feelings in Yes   Externalizes feelings through aggressiveness or poor temper control  Yes   Feels uncomfortable around others  Yes   Has difficulty talking to others  Yes   Depends on others for direction or decisions Yes   Difficulty dealing with anger of others  Yes   Difficulty dealing with own anger  Yes   Difficulty managing stress Yes   Frequently has difficulty with relationships  Yes   which,who,where in family   Has recently perceived/experienced loss, disappointment,

## 2020-07-10 NOTE — PLAN OF CARE
[facility]  Initial Interdisciplinary Treatment Plan NOTE    Review Date & Time: 971/15 0812    Patient was not in treatment team    Admission Type:   Admission Type:  Involuntary    Reason for admission:  Reason for Admission: Depression, SI      Estimated Length of Stay Update:  3-5 days  Estimated Discharge Date Update: 7/13/20-7/15/20    PATIENT STRENGTHS:  Patient Strengths Strengths: Connection to output provider(Hospice. )  Patient Strengths and Limitations:Limitations: Perceives need for assistance with self-care, Tendency to isolate self, Difficult relationships / poor social skills, General negative or hopeless attitude about future/recovery, Demonstrates discomfort with /lack of social skills, Hopeless about future, Difficulty problem solving/relies on others to help solve problems, Limited education -> difficulty reading or writing  Addictive Behavior:Addictive Behavior  In the past 3 months, have you felt or has someone told you that you have a problem with:  : None  Do you have a history of Chemical Use?: Comment(Pt stated she used substances many many years ago but cannot remember what.)  Do you have a history of Alcohol Use?: Comment(Pt stated she drank alochol many many years ago. )  Do you have a history of Street Drug Abuse?: Comment(Pt stated she used substances many many years ago but cannot remember what.)  Histroy of Prescripton Drug Abuse?: Comment(Pt's hospice CM has concerns that the pt is abusing her narcotic medication; as her medication counts are often off.)  Medical Problems:  Past Medical History:   Diagnosis Date    Arthritis     lower back, shoulder blades    Bipolar depression ([address]     CAD (coronary artery disease)     CHF (congestive heart failure) ([address]     COPD (chronic obstructive pulmonary disease) ([address]     Depression     Diabetes mellitus ([address]     Hyperlipidemia     Pneumonia     Thyroid disease     hypothyroidism    Unspecified cerebral artery occlusion with cerebral infarction     2006       EDUCATION:   Learner Progress Toward Treatment Goals: Reviewed results and recommendations of this team    Method: Individual    Outcome: Verbalized understanding    PATIENT GOALS: none expressed    PLAN/TREATMENT RECOMMENDATIONS UPDATE:evaluate     GOALS UPDATE:   Time frame for Short-Term Goals: 2 days    Philip Reyes RN

## 2020-07-10 NOTE — BH NOTE
Melonie Pedroza has overall had a good day. She is tearful at times related to her desire to sleep (primary complaint) and pain.  +medication administration, whole with water. O2 on 2/3L, some audible wheezing at times. Unsteady on feet, does ok for short distances with walker, gait belt and assist x 1. Good oral intake, will monitor need for bedside commode tonight due to administration time of lasix dose. She is frustrated and tearful with her memory loss.

## 2020-07-11 LAB
CHOLESTEROL, TOTAL: 208 MG/DL (ref 0–199)
GLUCOSE BLD-MCNC: 140 MG/DL (ref 70–99)
HDLC SERPL-MCNC: 35 MG/DL (ref 40–60)
LDL CHOLESTEROL CALCULATED: 139 MG/DL
PERFORMED ON: ABNORMAL
TRIGL SERPL-MCNC: 169 MG/DL (ref 0–150)
VLDLC SERPL CALC-MCNC: 34 MG/DL

## 2020-07-11 PROCEDURE — 6370000000 HC RX 637 (ALT 250 FOR IP): Performed by: PSYCHIATRY & NEUROLOGY

## 2020-07-11 PROCEDURE — 94761 N-INVAS EAR/PLS OXIMETRY MLT: CPT

## 2020-07-11 PROCEDURE — 80061 LIPID PANEL: CPT

## 2020-07-11 PROCEDURE — 1240000000 HC EMOTIONAL WELLNESS R&B

## 2020-07-11 PROCEDURE — 99233 SBSQ HOSP IP/OBS HIGH 50: CPT | Performed by: PSYCHIATRY & NEUROLOGY

## 2020-07-11 PROCEDURE — 36415 COLL VENOUS BLD VENIPUNCTURE: CPT

## 2020-07-11 PROCEDURE — 83036 HEMOGLOBIN GLYCOSYLATED A1C: CPT

## 2020-07-11 PROCEDURE — 6370000000 HC RX 637 (ALT 250 FOR IP): Performed by: NURSE PRACTITIONER

## 2020-07-11 PROCEDURE — 2700000000 HC OXYGEN THERAPY PER DAY

## 2020-07-11 PROCEDURE — 94640 AIRWAY INHALATION TREATMENT: CPT

## 2020-07-11 RX ORDER — ALBUTEROL SULFATE 90 UG/1
2 AEROSOL, METERED RESPIRATORY (INHALATION) EVERY 4 HOURS PRN
Status: DISCONTINUED | OUTPATIENT
Start: 2020-07-11 | End: 2020-07-22 | Stop reason: HOSPADM

## 2020-07-11 RX ORDER — ALBUTEROL SULFATE 90 UG/1
2 AEROSOL, METERED RESPIRATORY (INHALATION) 2 TIMES DAILY
Status: DISCONTINUED | OUTPATIENT
Start: 2020-07-11 | End: 2020-07-13

## 2020-07-11 RX ADMIN — POTASSIUM CHLORIDE 10 MEQ: 750 TABLET, EXTENDED RELEASE ORAL at 09:39

## 2020-07-11 RX ADMIN — DOCUSATE SODIUM 100 MG: 100 CAPSULE, LIQUID FILLED ORAL at 09:39

## 2020-07-11 RX ADMIN — DIVALPROEX SODIUM 250 MG: 250 TABLET, DELAYED RELEASE ORAL at 20:36

## 2020-07-11 RX ADMIN — Medication 2 PUFF: at 07:57

## 2020-07-11 RX ADMIN — ERYTHROMYCIN 500 MG: 250 TABLET, FILM COATED ORAL at 20:36

## 2020-07-11 RX ADMIN — METFORMIN HYDROCHLORIDE 1000 MG: 500 TABLET ORAL at 09:39

## 2020-07-11 RX ADMIN — HYDROCODONE BITARTRATE AND ACETAMINOPHEN 1 TABLET: 10; 325 TABLET ORAL at 22:40

## 2020-07-11 RX ADMIN — DOCUSATE SODIUM 100 MG: 100 CAPSULE, LIQUID FILLED ORAL at 20:36

## 2020-07-11 RX ADMIN — PANTOPRAZOLE SODIUM 40 MG: 40 TABLET, DELAYED RELEASE ORAL at 06:57

## 2020-07-11 RX ADMIN — Medication 2 PUFF: at 21:18

## 2020-07-11 RX ADMIN — CLOPIDOGREL BISULFATE 75 MG: 75 TABLET ORAL at 09:39

## 2020-07-11 RX ADMIN — TRAZODONE HYDROCHLORIDE 25 MG: 50 TABLET ORAL at 20:36

## 2020-07-11 RX ADMIN — HYDROCODONE BITARTRATE AND ACETAMINOPHEN 1 TABLET: 10; 325 TABLET ORAL at 01:35

## 2020-07-11 RX ADMIN — FUROSEMIDE 20 MG: 20 TABLET ORAL at 09:39

## 2020-07-11 RX ADMIN — PREDNISONE 7.5 MG: 5 TABLET ORAL at 09:39

## 2020-07-11 RX ADMIN — DIVALPROEX SODIUM 250 MG: 250 TABLET, DELAYED RELEASE ORAL at 09:39

## 2020-07-11 RX ADMIN — HYDROCODONE BITARTRATE AND ACETAMINOPHEN 1 TABLET: 10; 325 TABLET ORAL at 16:39

## 2020-07-11 RX ADMIN — LEVOTHYROXINE SODIUM 125 MCG: 125 TABLET ORAL at 06:57

## 2020-07-11 RX ADMIN — QUETIAPINE FUMARATE 100 MG: 100 TABLET ORAL at 20:36

## 2020-07-11 ASSESSMENT — PAIN SCALES - GENERAL
PAINLEVEL_OUTOF10: 0
PAINLEVEL_OUTOF10: 8
PAINLEVEL_OUTOF10: 8
PAINLEVEL_OUTOF10: 0
PAINLEVEL_OUTOF10: 8
PAINLEVEL_OUTOF10: 8
PAINLEVEL_OUTOF10: 0
PAINLEVEL_OUTOF10: 0

## 2020-07-11 ASSESSMENT — PAIN DESCRIPTION - FREQUENCY
FREQUENCY: INTERMITTENT
FREQUENCY: INTERMITTENT

## 2020-07-11 ASSESSMENT — PAIN DESCRIPTION - ORIENTATION
ORIENTATION: LOWER
ORIENTATION: LOWER;MID

## 2020-07-11 ASSESSMENT — PAIN DESCRIPTION - PAIN TYPE
TYPE: CHRONIC PAIN
TYPE: CHRONIC PAIN

## 2020-07-11 ASSESSMENT — PAIN DESCRIPTION - ONSET
ONSET: ON-GOING
ONSET: ON-GOING

## 2020-07-11 ASSESSMENT — PAIN - FUNCTIONAL ASSESSMENT
PAIN_FUNCTIONAL_ASSESSMENT: PREVENTS OR INTERFERES SOME ACTIVE ACTIVITIES AND ADLS
PAIN_FUNCTIONAL_ASSESSMENT: ACTIVITIES ARE NOT PREVENTED

## 2020-07-11 ASSESSMENT — PAIN DESCRIPTION - LOCATION
LOCATION: BACK
LOCATION: BACK

## 2020-07-11 ASSESSMENT — PAIN DESCRIPTION - PROGRESSION
CLINICAL_PROGRESSION: GRADUALLY WORSENING
CLINICAL_PROGRESSION: GRADUALLY WORSENING

## 2020-07-11 ASSESSMENT — PAIN DESCRIPTION - DESCRIPTORS
DESCRIPTORS: ACHING
DESCRIPTORS: ACHING

## 2020-07-11 NOTE — BH NOTE
Agata Carrillo was invited to attend [employer] appeared to be resting and did not rouse upon invite. Agata Carrillo did not attend group.     Douglas Monroe, MARVS

## 2020-07-11 NOTE — GROUP NOTE
Group Therapy Note    Date: 7/11/2020    Group Start Time: 3840  Group End Time: 4001  Group Topic: Psychoeducation    Isaura Burkett        Group Therapy Note    Attendees: 3    Patient's Goal: to work as a team to solve a leisure word search, to promote leisure education, problem solving skills, cognitive stimulation, memory recall, reminiscing, socialization, and mood improvement. Notes: Jo Neves observed group activity and positively socialized with peers. Jo Neves was observed smiling and laughing multiple times, while engaged in group discussion. Jo Neves actively listened and contributed to leisure education processing discussion. Jo Neves struggled to concentrate near the end of group. Jo Neves redirected her concentration by asking peers questions relaying to the discussion topic. Jo Neves voiced understanding of education topic. Status After Intervention:  Improved    Participation Level:  Active Listener and Interactive    Participation Quality: Appropriate and Sharing      Speech:  normal      Thought Process/Content: [place]      Affective Functioning: Constricted/Restricted      Mood: anxious and depressed      Level of consciousness:  Alert and Attentive      Response to Learning: Able to verbalize/acknowledge new learning, Capable of insight, Able to change behavior and Progressing to goal      Endings: None Reported    Modes of Intervention: Education, Support, Socialization, Exploration, Clarifying, Problem-solving and Activity      Discipline Responsible: Psychoeducational Specialist      Signature:  Richy Campo [place]

## 2020-07-11 NOTE — PROGRESS NOTES
RESPIRATORY THERAPY ASSESSMENT    Name:  [address] [place] [address] Record Number:  [id number]  Age: 70 y.o. Gender: female  : 1945  Today's Date:  2020  Room:  [facility]2209-01    Assessment     Is the patient being admitted for a COPD or Asthma exacerbation? No   (If yes the patient will be seen every 4 hours for the first 24 hours and then reassessed)    Patient Admission Diagnosis      Allergies  Allergies   Allergen Reactions    Buspar [Buspirone Hcl]        Minimum Predicted Vital Capacity:               Actual Vital Capacity:                    Pulmonary History: COPD  Home Oxygen Therapy:   3 lpm  Home Respiratory Therapy: Albuterol, Breo   Current Respiratory Therapy:  Albuterol prn, Symbicort BID  Treatment Type: MDI  Medications: Budesonide/Formoterol    Respiratory Severity Index(RSI)   Patients with orders for inhalation medications, oxygen, or any therapeutic treatment modality will be placed on Respiratory Protocol. They will be assessed with the first treatment and at least every 72 hours thereafter. The following severity scale will be used to determine frequency of treatment intervention.     Smoking History: Pulmonary Disease or Smoking History, Greater than 15 pack year = 2    Social History  Social History     Tobacco Use    Smoking status: Former Smoker     Packs/day: 3.00     Years: 47.00     Pack years: 141.00     Last attempt to quit: 2007     Years since quittin.5    Smokeless tobacco: Never Used   Substance Use Topics    Alcohol use: No     Alcohol/week: 0.0 standard drinks    Drug use: No       Recent Surgical History: None = 0  Past Surgical History  Past Surgical History:   Procedure Laterality Date    APPENDECTOMY      taken out with gallbladder    CARDIAC PACEMAKER PLACEMENT      CARDIAC SURGERY      pacemaker     CARDIAC SURGERY      STENT    CHOLECYSTECTOMY      COLONOSCOPY      DIAGNOSTIC CARDIAC CATH  46 partial    OTHER SURGICAL HISTORY  5/28/13    quadroscopy    PACEMAKER INSERTION      PACEMAKER PLACEMENT      TONSILLECTOMY      TUBAL LIGATION      VASCULAR SURGERY      stent placement       Level of Consciousness: Alert, Oriented, and Cooperative = 0    Level of Activity: Walking with assistance = 1    Respiratory Pattern: Regular Pattern; RR 8-20 = 0    Breath Sounds: Diminshed bilaterally and/or crackles = 2    Sputum   ,  ,    Cough: Strong, spontaneous, non-productive = 0    Vital Signs   /62   Pulse 99   Temp 97 °F (36.1 °C) (Oral)   Resp 18   Ht 5' 2\" (1.575 m)   Wt 178 lb 3.2 oz (80.8 kg)   SpO2 98%   BMI 32.59 kg/m²   SPO2 (COPD values may differ): 88-89% on room air or greater than 92% on FiO2 28- 35% = 2    Peak Flow (asthma only): not applicable = 0    RSI: 7-8 = BID and Q4HPRN (every four hours as needed) for dyspnea        Plan       Goals:  Medication delivery     Patient/caregiver was educated on the proper method of use for Respiratory Care Devices:  Yes      Level of patient/caregiver understanding able to:   ? Verbalize understanding   ? Demonstrate understanding       ? Teach back        ? Needs reinforcement       ? No available caregiver               ? Other:     Response to education:  Good     Is patient being placed on Home Treatment Regimen? No     Does the patient have everything they need prior to discharge? NA     Comments:  Chart reviewed, patient assessed    Plan of Care: Albuterol BID, Albuterol prn, Symbicort BID    Electronically signed by Mack Gamez RCP on 7/11/2020 at 8:11 AM    Respiratory Protocol Guidelines     1. Assessment and treatment by Respiratory Therapy will be initiated for medication and therapeutic interventions upon initiation of aerosolized medication. 2. Physician will be contacted for respiratory rate (RR) greater than 35 breaths per minute.  Therapy will be held for heart rate (HR) greater than 140 beats per minute, pending direction from physician. 3. Bronchodilators will be administered via Metered Dose Inhaler (MDI) with spacer when the following criteria are met:  a. Alert and cooperative     b. HR < 140 bpm  c. RR < 30 bpm                d. Can demonstrate a 2-3 second inspiratory hold  4. Bronchodilators will be administered via Hand Held Nebulizer ANTOINETTE [facility]) to patients when ANY of the following criteria are met  a. Incognizant or uncooperative          b. Patients treated with HHN at Home        c. Unable to demonstrate proper use of MDI with spacer     d. RR > 30 bpm   5. Bronchodilators will be delivered via Metered Dose Inhaler (MDI), HHN, Aerogen to intubated patients on mechanical ventilation. 6. Inhalation medication orders will be delivered and/or substituted as outlined below. Aerosolized Medications Ordering and Administration Guidelines:    1. All Medications will be ordered by a physician, and their frequency and/or modality will be adjusted as defined by the patients Respiratory Severity Index (RSI) score. 2. If the patient does not have documented COPD, consider discontinuing anticholinergics when RSI is less than 9.  3. If the bronchospasm worsens (increased RSI), then the bronchodilator frequency can be increased to a maximum of every 4 hours. If greater than every 4 hours is required, the physician will be contacted. 4. If the bronchospasm improves, the frequency of the bronchodilator can be decreased, based on the patient's RSI, but not less than home treatment regimen frequency. 5. Bronchodilator(s) will be discontinued if patient has a RSI less than 9 and has received no scheduled or as needed treatment for 72  Hrs. Patients Ordered on a Mucolytic Agent:    1. Must always be administered with a bronchodilator. 2. Discontinue if patient experiences worsened bronchospasm, or secretions have lessened to the point that the patient is able to clear them with a cough.     Anti-inflammatory and Combination Medications:    1. If the patient lacks prior history of lung disease, is not using inhaled anti-inflammatory medication at home, and lacks wheezing by examination or by history for at least 24 hours, contact physician for possible discontinuation.

## 2020-07-11 NOTE — BH NOTE
Pt A+O to self only, pt is tearful and anxious. Pt kept repeating that she just wants to sleep. She endorses SI saying \"I don't want to live, but contracts to safety. Pt denies HI/AVH and no RTIS. Pt given Tylenol for 8/10 chronic pain, Trazodone for sleep and Haldol for agitation with HS medications. Pt assisted to the bathroom and to bed. She is currently sleeping with the bed alarm on.

## 2020-07-12 LAB
ESTIMATED AVERAGE GLUCOSE: 131.2 MG/DL
GLUCOSE BLD-MCNC: 131 MG/DL (ref 70–99)
HBA1C MFR BLD: 6.2 %
PERFORMED ON: ABNORMAL

## 2020-07-12 PROCEDURE — 6370000000 HC RX 637 (ALT 250 FOR IP): Performed by: PSYCHIATRY & NEUROLOGY

## 2020-07-12 PROCEDURE — 6370000000 HC RX 637 (ALT 250 FOR IP): Performed by: NURSE PRACTITIONER

## 2020-07-12 PROCEDURE — 94640 AIRWAY INHALATION TREATMENT: CPT

## 2020-07-12 PROCEDURE — 94761 N-INVAS EAR/PLS OXIMETRY MLT: CPT

## 2020-07-12 PROCEDURE — 2700000000 HC OXYGEN THERAPY PER DAY

## 2020-07-12 PROCEDURE — 1240000000 HC EMOTIONAL WELLNESS R&B

## 2020-07-12 RX ORDER — TROLAMINE SALICYLATE 10 G/100G
CREAM TOPICAL 2 TIMES DAILY PRN
Status: DISCONTINUED | OUTPATIENT
Start: 2020-07-12 | End: 2020-07-22 | Stop reason: HOSPADM

## 2020-07-12 RX ADMIN — Medication 2 PUFF: at 20:40

## 2020-07-12 RX ADMIN — LORAZEPAM 0.5 MG: 0.5 TABLET ORAL at 23:43

## 2020-07-12 RX ADMIN — Medication 2 PUFF: at 07:40

## 2020-07-12 RX ADMIN — ERYTHROMYCIN 500 MG: 250 TABLET, FILM COATED ORAL at 21:04

## 2020-07-12 RX ADMIN — DIVALPROEX SODIUM 250 MG: 250 TABLET, DELAYED RELEASE ORAL at 21:04

## 2020-07-12 RX ADMIN — TROLAMINE SALICYLATE: 10 CREAM TOPICAL at 21:05

## 2020-07-12 RX ADMIN — CLOPIDOGREL BISULFATE 75 MG: 75 TABLET ORAL at 08:16

## 2020-07-12 RX ADMIN — TRAZODONE HYDROCHLORIDE 25 MG: 50 TABLET ORAL at 21:06

## 2020-07-12 RX ADMIN — PREDNISONE 7.5 MG: 5 TABLET ORAL at 08:16

## 2020-07-12 RX ADMIN — LEVOTHYROXINE SODIUM 125 MCG: 125 TABLET ORAL at 08:15

## 2020-07-12 RX ADMIN — POTASSIUM CHLORIDE 10 MEQ: 750 TABLET, EXTENDED RELEASE ORAL at 08:16

## 2020-07-12 RX ADMIN — HYDROCODONE BITARTRATE AND ACETAMINOPHEN 1 TABLET: 10; 325 TABLET ORAL at 21:03

## 2020-07-12 RX ADMIN — METFORMIN HYDROCHLORIDE 1000 MG: 500 TABLET ORAL at 08:15

## 2020-07-12 RX ADMIN — QUETIAPINE FUMARATE 100 MG: 100 TABLET ORAL at 21:06

## 2020-07-12 RX ADMIN — DOCUSATE SODIUM 100 MG: 100 CAPSULE, LIQUID FILLED ORAL at 08:16

## 2020-07-12 RX ADMIN — Medication 2 PUFF: at 20:41

## 2020-07-12 RX ADMIN — PANTOPRAZOLE SODIUM 40 MG: 40 TABLET, DELAYED RELEASE ORAL at 08:16

## 2020-07-12 RX ADMIN — HYDROCODONE BITARTRATE AND ACETAMINOPHEN 1 TABLET: 10; 325 TABLET ORAL at 08:15

## 2020-07-12 RX ADMIN — DOCUSATE SODIUM 100 MG: 100 CAPSULE, LIQUID FILLED ORAL at 21:04

## 2020-07-12 RX ADMIN — FUROSEMIDE 20 MG: 20 TABLET ORAL at 08:16

## 2020-07-12 RX ADMIN — DIVALPROEX SODIUM 250 MG: 250 TABLET, DELAYED RELEASE ORAL at 08:16

## 2020-07-12 ASSESSMENT — PAIN DESCRIPTION - PAIN TYPE
TYPE: CHRONIC PAIN
TYPE: CHRONIC PAIN

## 2020-07-12 ASSESSMENT — PAIN SCALES - GENERAL
PAINLEVEL_OUTOF10: 0
PAINLEVEL_OUTOF10: 8
PAINLEVEL_OUTOF10: 0
PAINLEVEL_OUTOF10: 6
PAINLEVEL_OUTOF10: 0
PAINLEVEL_OUTOF10: 0
PAINLEVEL_OUTOF10: 5
PAINLEVEL_OUTOF10: 0

## 2020-07-12 ASSESSMENT — PAIN DESCRIPTION - LOCATION: LOCATION: BACK

## 2020-07-12 ASSESSMENT — PAIN DESCRIPTION - DESCRIPTORS: DESCRIPTORS: ACHING

## 2020-07-12 ASSESSMENT — PAIN DESCRIPTION - PROGRESSION
CLINICAL_PROGRESSION: GRADUALLY WORSENING

## 2020-07-12 ASSESSMENT — PAIN DESCRIPTION - ORIENTATION: ORIENTATION: LOWER;MID

## 2020-07-12 ASSESSMENT — PAIN DESCRIPTION - ONSET
ONSET: ON-GOING
ONSET: ON-GOING

## 2020-07-12 ASSESSMENT — PAIN - FUNCTIONAL ASSESSMENT: PAIN_FUNCTIONAL_ASSESSMENT: PREVENTS OR INTERFERES SOME ACTIVE ACTIVITIES AND ADLS

## 2020-07-12 ASSESSMENT — PAIN DESCRIPTION - FREQUENCY
FREQUENCY: CONTINUOUS
FREQUENCY: CONTINUOUS

## 2020-07-12 NOTE — PROGRESS NOTES
Progress note    Admission Date:    7/9/2020    Chief complaint : Suicidal ideation    S: Pt stated that she is feeling better and reports that she just want to do good and for her daughter Jordi Deeds to forgive her for whatever she has done. Pt switches topics often and reports that she gets excited and her brain stops working. Pt stated that she peed on herself and she feels embarass but she had to shower and that was good. She asked about the stroke and what that means and when is she going to fully recovered and attempted to explain but she does not understand. Per nursing no behaviors and she appears to be doing well and attending grps. Current Medications Ordered:   albuterol sulfate HFA  2 puff Inhalation BID    clopidogrel  75 mg Oral Daily    divalproex  250 mg Oral BID    erythromycin base  500 mg Oral Nightly    pantoprazole  40 mg Oral QAM AC    docusate sodium  100 mg Oral BID    budesonide-formoterol  2 puff Inhalation BID    furosemide  20 mg Oral Daily    levothyroxine  125 mcg Oral Daily    metFORMIN  1,000 mg Oral Daily with breakfast    potassium chloride  10 mEq Oral Daily with breakfast    predniSONE  7.5 mg Oral Daily    QUEtiapine  100 mg Oral Nightly      PRN Meds: trolamine salicylate, albuterol sulfate HFA, acetaminophen, haloperidol lactate **OR** haloperidol, traZODone, benztropine mesylate, magnesium hydroxide, aluminum & magnesium hydroxide-simethicone, HYDROcodone-acetaminophen, LORazepam **OR** [DISCONTINUED] LORazepam     ROS:      All other systems were reviewed and negative except as previously documented in HPI.     PE:    /69   Pulse 82   Temp 96.9 °F (36.1 °C) (Oral)   Resp 16   Ht 5' 2\" (1.575 m)   Wt 176 lb 14.4 oz (80.2 kg)   SpO2 100%   BMI 32.36 kg/m²       Motor / Gait: psychomotor retardation, nml tone, no involuntary movements, gait deferred    Mental Status Examination:    Appearance: WF, appears stated age, wearing hospital gown, good grooming and hygiene  Behavior/Attitude toward examiner:  Cooperative, but inattentive, good EC  Speech:  Halting, +word finding difficulty  Mood:  \"better\"  Affect:  Labile  Thought processes:  +derailment  Thought Content: denies SI, denies HI, no delusions voiced, no obsessions, +confabulation  Perceptions: Denies active AVH, not   RTIS  Attention: Impaired  Abstraction: [place]  Cognition: Average AYDE, Alert and oriented to person, place, time, and vaguely situation, recall impaired  Insight: Minimal insight   Judgment: Impaired judgment      LAB:   Admission on 07/09/2020   Component Date Value Ref Range Status    WBC 07/09/2020 7.2  4.0 - 11.0 K/uL Final    RBC 07/09/2020 4.85  4.00 - 5.20 M/uL Final    Hemoglobin 07/09/2020 13.8  12.0 - 16.0 g/dL Final    Hematocrit 07/09/2020 42.1  36.0 - 48.0 % Final    MCV 07/09/2020 86.8  80.0 - 100.0 fL Final    MCH 07/09/2020 28.4  26.0 - 34.0 pg Final    MCHC 07/09/2020 32.7  31.0 - 36.0 g/dL Final    RDW 07/09/2020 14.2  12.4 - 15.4 % Final    Platelets 45/05/8229 377  135 - 450 K/uL Final    MPV 07/09/2020 7.0  5.0 - 10.5 fL Final    Neutrophils % 07/09/2020 48.3  % Final    Lymphocytes % 07/09/2020 36.5  % Final    Monocytes % 07/09/2020 11.1  % Final    Eosinophils % 07/09/2020 3.0  % Final    Basophils % 07/09/2020 1.1  % Final    Neutrophils Absolute 07/09/2020 3.4  1.7 - 7.7 K/uL Final    Lymphocytes Absolute 07/09/2020 2.6  1.0 - 5.1 K/uL Final    Monocytes Absolute 07/09/2020 0.8  0.0 - 1.3 K/uL Final    Eosinophils Absolute 07/09/2020 0.2  0.0 - 0.6 K/uL Final    Basophils Absolute 07/09/2020 0.1  0.0 - 0.2 K/uL Final    Sodium 07/09/2020 143  136 - 145 mmol/L Final    Potassium reflex Magnesium 07/09/2020 4.5  3.5 - 5.1 mmol/L Final    Chloride 07/09/2020 93* 99 - 110 mmol/L Final    CO2 07/09/2020 34* 21 - 32 mmol/L Final    Anion Gap 07/09/2020 16  3 - 16 Final    Glucose 07/09/2020 133* 70 - 99 mg/dL Final    BUN 07/09/2020 13 Negative <300 ng/mL Final    Comment: \"Therapeutic levels of pain medication, especially oxycontin and synthetic  opioids, may not be detected by this Methodology. Pain management screen  panel  Drug panel-PM-Hi Res Ur, Interp (PAIN) should be considered for drug  monitoring \".  PCP Screen, Urine 07/09/2020 Neg  Negative <25 ng/mL Final    Methadone Screen, Urine 07/09/2020 Neg  Negative <300 ng/mL Final    Propoxyphene Scrn, Ur 07/09/2020 Neg  Negative <300 ng/mL Final    Oxycodone Urine 07/09/2020 Neg  Negative <100 ng/ml Final    pH, UA 07/09/2020 5.5   Final    Comment: Urine pH less than 5.0 or greater than 8.0 may indicate sample adulteration. Another sample should be collected if clinically  indicated.  Drug Screen Comment: 07/09/2020 see below   Final    Comment: This method is a screening test to detect only these drug  classes as part of a medical workup. Confirmatory testing  by another method should be ordered if clinically indicated.       Color, UA 07/09/2020 Yellow  Straw/Yellow Final    Clarity, UA 07/09/2020 Clear  Clear Final    Glucose, Ur 07/09/2020 Negative  Negative mg/dL Final    Bilirubin Urine 07/09/2020 Negative  Negative Final    Ketones, Urine 07/09/2020 Negative  Negative mg/dL Final    Specific [place], UA 07/09/2020 1.015  1.005 - 1.030 Final    Blood, Urine 07/09/2020 Negative  Negative Final    pH, UA 07/09/2020 5.5  5.0 - 8.0 Final    Protein, UA 07/09/2020 Negative  Negative mg/dL Final    Urobilinogen, Urine 07/09/2020 0.2  <2.0 E.U./dL Final    Nitrite, Urine 07/09/2020 Negative  Negative Final    Leukocyte Esterase, Urine 07/09/2020 Negative  Negative Final    Microscopic Examination 07/09/2020 Not Indicated   Final    Urine Type 07/09/2020 NotGiven   Final    Urine Reflex to Culture 07/09/2020 Not Indicated   Final    Ammonia 07/09/2020 33  11 - 51 umol/L Final    Valproic Acid Lvl 07/09/2020 <2.8* 50.0 - 100.0 ug/mL Final    Ventricular Rate 07/09/2020 101  BPM Final    Atrial Rate 07/09/2020 101  BPM Final    P-R Interval 07/09/2020 156  ms Final    QRS Duration 07/09/2020 74  ms Final    Q-T Interval 07/09/2020 354  ms Final    QTc Calculation ([place]) 07/09/2020 459  ms Final    P Axis 07/09/2020 78  degrees Final    R Axis 07/09/2020 35  degrees Final    T Axis 07/09/2020 80  degrees Final    Diagnosis 07/09/2020 Sinus tachycardiaOtherwise normal ECGWhen compared with ECG of 19-JUL-2016 05:43,No significant change was foundConfirmed by Kelsey Barker MD, [address] ([id number]) on 7/10/2020 7:30:00 AM   Final    Theophylline Lvl 07/09/2020 <0.8* 8.0 - 20.0 ug/mL Final    Valproic Acid Lvl 07/09/2020 <2.8* 50.0 - 100.0 ug/mL Final    SARS-CoV-2, NAAT 07/09/2020 Not Detected  Not Detected Final    Comment: Rapid NAAT:   Negative results should be treated as presumptive and,  if inconsistent with clinical signs and symptoms or necessary for  patient management, should be tested with an alternative molecular  assay. Negative results do not preclude SARS-CoV-2 infection and  should not be used as the sole basis for patient management decisions. This test has been authorized by the FDA under an Emergency Use  Authorization (EUA) for use by authorized laboratories.     Fact sheet for Healthcare Providers:  BuildHer.es  Fact sheet for Patients: BuildHer.es    METHODOLOGY: Isothermal Nucleic Acid Amplification      TSH 07/09/2020 2.42  0.27 - 4.20 uIU/mL Final    pH, Arterial 07/10/2020 7.402  7.350 - 7.450 Final    pCO2, Arterial 07/10/2020 53.8* 35.0 - 45.0 mmHg Final    pO2, Arterial 07/10/2020 109.9* 75.0 - 108.0 mmHg Final    HCO3, Arterial 07/10/2020 32.7* 21.0 - 29.0 mmol/L Final    Base Excess, Arterial 07/10/2020 6.4* -3.0 - 3.0 mmol/L Final    Hemoglobin, Art, Extended 07/10/2020 14.1  12.0 - 16.0 g/dL Final    O2 Sat, Arterial 07/10/2020 97.9  >92 % Final    Carboxyhgb, Arterial 07/10/2020 0.3  0.0 - 1.5 % Final    Comment:      0.0-1.5  (Smokers 1.5-5.0)      Methemoglobin, Arterial 07/10/2020 0.3  <1.5 % Final    TCO2, Arterial 07/10/2020 34.4  Not Established mmol/L Final    O2 Content, Arterial 07/10/2020 20  Not Established mL/dL Final    O2 Therapy 07/10/2020 See comment   Final    34m    Hemoglobin A1C 07/11/2020 6.2  See comment % Final    Comment: Comment:  Diagnosis of Diabetes: > or = 6.5%  Increased risk of diabetes (Prediabetes): 5.7-6.4%  Glycemic Control: Nonpregnant Adults: <7.0%                    Pregnant: <6.0%        eAG 07/11/2020 131.2  mg/dL Final    Cholesterol, Total 07/11/2020 208* 0 - 199 mg/dL Final    Triglycerides 07/11/2020 169* 0 - 150 mg/dL Final    HDL 07/11/2020 35* 40 - 60 mg/dL Final    LDL Calculated 07/11/2020 139* <100 mg/dL Final    VLDL Cholesterol Calculated 07/11/2020 34  Not Established mg/dL Final    POC Glucose 07/11/2020 140* 70 - 99 mg/dl Final    Performed on 07/11/2020 ACCU-CHEK   Final    POC Glucose 07/12/2020 131* 70 - 99 mg/dl Final    Performed on 07/12/2020 ACCU-CHEK   Final           Assessment and Plan:    Diagnoses:   Primary Psychiatric (DSM V) Diagnosis: Delirium due to polypharmacy, mixed level of activity  Secondary Psychiatric (DSM V) Diagnoses: Bipolar disorder, currently depressed, moderate  Chemical Dependency Diagnoses: Tobacco use disorder, in remission  Active Medical Diagnoses:   Patient Active Problem List   Diagnosis    Pneumonia    Chronic [facility] disease [facility]    Type II diabetes mellitus, well controlled ([facility]    CAD (coronary artery disease)    Acute-on-chronic respiratory failure ([facility]    Sepsis ([facility]    Pleural effusion    Hyperkalemia    CAP (community acquired pneumonia)    Chest pain    Chest pressure    Shortness of breath    History of coronary artery disease    MDD (major depressive disorder), single episode    Chronic ischemic heart disease    Pacemaker    Chronic respiratory failure with hypoxia (HCC)     All conditions detailed above are being treated while patient is hospitalized. Tx plan: will cont meds as prescribed    Legal Status: Voluntary      Chemical Dependency Issues:  Patient no longer smoker. Function:  -Consult physical therapy  -Consult occupational therapy  -Falls precautions    Medical Problems:  Internal medicine has been consulted. Appreciate recs. Code Status: DNR    Disposition:    -Housing: Own home  -[facility]    Estimated length of stay: per primary team    Criteria for Discharge:  Not psychotic, not homicidal, not suicidal, behavioral disturbance under control, sleeping well, mood improved/stable, eating well, aftercare arranged. Spent 40 minutes face to face with patient of which >50% was spent counseling and providing education regarding diagnosis, treatment options, and prognosis.

## 2020-07-12 NOTE — PLAN OF CARE
Problem: Falls - Risk of:  Goal: Will remain free from falls  Description: Will remain free from falls  Outcome: Ongoing     Problem: Coping:  Goal: Ability to remain calm will improve  Description: Ability to remain calm will improve  Outcome: Ongoing     Problem: Safety:  Goal: Ability to remain free from injury will improve  Description: Ability to remain free from injury will improve  Outcome: Ongoing     Problem: Self-Care:  Goal: Ability to participate in self-care as condition permits will improve  Description: Ability to participate in self-care as condition permits will improve  Outcome: Ongoing     Problem: Depressive Behavior With or Without Suicide Precautions:  Goal: Able to verbalize acceptance of life and situations over which he or she has no control  Description: Able to verbalize acceptance of life and situations over which he or she has no control  Outcome: Ongoing  Goal: Able to verbalize and/or display a decrease in depressive symptoms  Description: Able to verbalize and/or display a decrease in depressive symptoms  Outcome: Ongoing  Goal: Able to verbalize support systems  Description: Able to verbalize support systems  Outcome: Ongoing  Goal: Absence of self-harm  Description: Absence of self-harm  Outcome: Ongoing

## 2020-07-12 NOTE — BH NOTE
Pt A+Ox3, pt's affect is brightened, she was calm, cooperative and medication compliant. Trazodone given with HS medications for sleep. She denies SI/HI/AVH and no RTIS. Pt is in the dayroom, socializing with peers. Pt provided a snack and has no other needs at this time.

## 2020-07-12 NOTE — BH NOTE
Completed 1:1 interview and assessment with patient. Morning medications passed. Patient took medications whole with water. Patient stated they are feeling okay. Patient denies SI/HI/AVH at this time. Patient contracts for safety. Patient makes fair eye contact. Patient is alert and oriented 3x. Patient is going to group at this time to watch a movie. Will continue to monitor.

## 2020-07-13 LAB
BILIRUBIN URINE: NEGATIVE
BLOOD, URINE: NEGATIVE
CLARITY: CLEAR
COLOR: NORMAL
GLUCOSE BLD-MCNC: 123 MG/DL (ref 70–99)
GLUCOSE URINE: NEGATIVE MG/DL
KETONES, URINE: NEGATIVE MG/DL
LEUKOCYTE ESTERASE, URINE: NEGATIVE
MICROSCOPIC EXAMINATION: NORMAL
NITRITE, URINE: NEGATIVE
PERFORMED ON: ABNORMAL
PH UA: 5.5 (ref 5–8)
PROTEIN UA: NEGATIVE MG/DL
SPECIFIC GRAVITY UA: 1.01 (ref 1–1.03)
URINE REFLEX TO CULTURE: NORMAL
URINE TYPE: NORMAL
UROBILINOGEN, URINE: 0.2 E.U./DL

## 2020-07-13 PROCEDURE — 81003 URINALYSIS AUTO W/O SCOPE: CPT

## 2020-07-13 PROCEDURE — 97535 SELF CARE MNGMENT TRAINING: CPT

## 2020-07-13 PROCEDURE — 97165 OT EVAL LOW COMPLEX 30 MIN: CPT

## 2020-07-13 PROCEDURE — 94761 N-INVAS EAR/PLS OXIMETRY MLT: CPT

## 2020-07-13 PROCEDURE — 99232 SBSQ HOSP IP/OBS MODERATE 35: CPT | Performed by: PHYSICIAN ASSISTANT

## 2020-07-13 PROCEDURE — 1240000000 HC EMOTIONAL WELLNESS R&B

## 2020-07-13 PROCEDURE — 99232 SBSQ HOSP IP/OBS MODERATE 35: CPT | Performed by: PSYCHIATRY & NEUROLOGY

## 2020-07-13 PROCEDURE — 97530 THERAPEUTIC ACTIVITIES: CPT

## 2020-07-13 PROCEDURE — 6370000000 HC RX 637 (ALT 250 FOR IP): Performed by: PSYCHIATRY & NEUROLOGY

## 2020-07-13 PROCEDURE — 2700000000 HC OXYGEN THERAPY PER DAY

## 2020-07-13 PROCEDURE — 94640 AIRWAY INHALATION TREATMENT: CPT

## 2020-07-13 PROCEDURE — 6370000000 HC RX 637 (ALT 250 FOR IP): Performed by: PHYSICIAN ASSISTANT

## 2020-07-13 RX ORDER — QUETIAPINE FUMARATE 200 MG/1
200 TABLET, FILM COATED ORAL NIGHTLY
Status: DISCONTINUED | OUTPATIENT
Start: 2020-07-13 | End: 2020-07-14

## 2020-07-13 RX ORDER — GUAIFENESIN 600 MG/1
600 TABLET, EXTENDED RELEASE ORAL 2 TIMES DAILY
Status: DISCONTINUED | OUTPATIENT
Start: 2020-07-13 | End: 2020-07-22 | Stop reason: HOSPADM

## 2020-07-13 RX ORDER — IPRATROPIUM BROMIDE AND ALBUTEROL SULFATE 2.5; .5 MG/3ML; MG/3ML
1 SOLUTION RESPIRATORY (INHALATION)
Status: DISCONTINUED | OUTPATIENT
Start: 2020-07-13 | End: 2020-07-14

## 2020-07-13 RX ADMIN — IPRATROPIUM BROMIDE AND ALBUTEROL SULFATE 1 AMPULE: .5; 3 SOLUTION RESPIRATORY (INHALATION) at 20:30

## 2020-07-13 RX ADMIN — PANTOPRAZOLE SODIUM 40 MG: 40 TABLET, DELAYED RELEASE ORAL at 07:54

## 2020-07-13 RX ADMIN — DOCUSATE SODIUM 100 MG: 100 CAPSULE, LIQUID FILLED ORAL at 07:55

## 2020-07-13 RX ADMIN — CLOPIDOGREL BISULFATE 75 MG: 75 TABLET ORAL at 07:54

## 2020-07-13 RX ADMIN — HYDROCODONE BITARTRATE AND ACETAMINOPHEN 1 TABLET: 10; 325 TABLET ORAL at 18:42

## 2020-07-13 RX ADMIN — TROLAMINE SALICYLATE: 10 CREAM TOPICAL at 20:57

## 2020-07-13 RX ADMIN — DIVALPROEX SODIUM 250 MG: 250 TABLET, DELAYED RELEASE ORAL at 07:54

## 2020-07-13 RX ADMIN — POTASSIUM CHLORIDE 10 MEQ: 750 TABLET, EXTENDED RELEASE ORAL at 07:54

## 2020-07-13 RX ADMIN — QUETIAPINE FUMARATE 200 MG: 200 TABLET ORAL at 20:52

## 2020-07-13 RX ADMIN — HYDROCODONE BITARTRATE AND ACETAMINOPHEN 1 TABLET: 10; 325 TABLET ORAL at 12:24

## 2020-07-13 RX ADMIN — Medication 2 PUFF: at 08:27

## 2020-07-13 RX ADMIN — Medication 2 PUFF: at 08:30

## 2020-07-13 RX ADMIN — GUAIFENESIN 600 MG: 600 TABLET, EXTENDED RELEASE ORAL at 20:52

## 2020-07-13 RX ADMIN — PREDNISONE 7.5 MG: 5 TABLET ORAL at 07:54

## 2020-07-13 RX ADMIN — DOCUSATE SODIUM 100 MG: 100 CAPSULE, LIQUID FILLED ORAL at 20:53

## 2020-07-13 RX ADMIN — METFORMIN HYDROCHLORIDE 1000 MG: 500 TABLET ORAL at 07:55

## 2020-07-13 RX ADMIN — LEVOTHYROXINE SODIUM 125 MCG: 125 TABLET ORAL at 07:54

## 2020-07-13 RX ADMIN — FUROSEMIDE 20 MG: 20 TABLET ORAL at 07:54

## 2020-07-13 RX ADMIN — Medication 2 PUFF: at 20:32

## 2020-07-13 RX ADMIN — DIVALPROEX SODIUM 250 MG: 250 TABLET, DELAYED RELEASE ORAL at 20:52

## 2020-07-13 RX ADMIN — ERYTHROMYCIN 750 MG: 250 TABLET, FILM COATED ORAL at 20:53

## 2020-07-13 ASSESSMENT — PAIN SCALES - GENERAL
PAINLEVEL_OUTOF10: 0
PAINLEVEL_OUTOF10: 8
PAINLEVEL_OUTOF10: 0
PAINLEVEL_OUTOF10: 0
PAINLEVEL_OUTOF10: 8
PAINLEVEL_OUTOF10: 2
PAINLEVEL_OUTOF10: 3

## 2020-07-13 NOTE — PROGRESS NOTES
Progress Note    Admit Date:  7/9/2020    Subjective:  Ms. Jerzy Mustafa c/o increasing cough. No fevers, chills or chest pain. No SOB. SARS-CoV-2 on admission. Does complain of dysuria. Objective:   BP (!) 140/70   Pulse 81   Temp 97.3 °F (36.3 °C) (Oral)   Resp 18   Ht 5' 2\" (1.575 m)   Wt 177 lb 14.4 oz (80.7 kg)   SpO2 99%   BMI 32.54 kg/m²          Intake/Output Summary (Last 24 hours) at 7/13/2020 1442  Last data filed at 7/13/2020 1255  Gross per 24 hour   Intake 480 ml   Output --   Net 480 ml       Physical Exam:  Gen: No distress. Alert. Elderly  female  Eyes: PERRL. No sclera icterus. No conjunctival injection. ENT: No discharge. Pharynx clear. Neck:  Trachea midline. Resp: No accessory muscle use. No crackles. No wheezes. No rhonchi. supp O2  CV: Regular rate. Regular rhythm. No murmur. No rub. No edema. GI: Soft, Non-tender. Non-distended. Normal bowel sounds. Skin: Warm and dry. No rash on exposed extremities. M/S: No cyanosis. No clubbing. Ambulates with walker. Neuro: Awake. Grossly nonfocal, CN II-XII intact    Psych: Oriented x 3. Defer to psychiatry.     Scheduled Meds:   QUEtiapine  150 mg Oral Nightly    albuterol sulfate HFA  2 puff Inhalation BID    clopidogrel  75 mg Oral Daily    divalproex  250 mg Oral BID    erythromycin base  500 mg Oral Nightly    pantoprazole  40 mg Oral QAM AC    docusate sodium  100 mg Oral BID    budesonide-formoterol  2 puff Inhalation BID    furosemide  20 mg Oral Daily    levothyroxine  125 mcg Oral Daily    metFORMIN  1,000 mg Oral Daily with breakfast    potassium chloride  10 mEq Oral Daily with breakfast    predniSONE  7.5 mg Oral Daily     PRN Meds:  trolamine salicylate, albuterol sulfate HFA, acetaminophen, haloperidol lactate **OR** haloperidol, traZODone, benztropine mesylate, magnesium hydroxide, aluminum & magnesium hydroxide-simethicone, HYDROcodone-acetaminophen, LORazepam **OR** [DISCONTINUED] LORazepam    CULTURES  None     RADIOLOGY    XR CHEST PORTABLE 7/9/2020   Final Result   No acute cardiopulmonary disease. CT Head WO Contrast 7/9/2020   Final Result   No acute intracranial abnormality.            Assessment/Plan:    Hallucinations  Depression  - cont mgmt per [facility]     Dysuria  - check UA with reflex    Cough   COPD - no AE  Chronic Hypoxic Respiratory Failure  Chronic Steroid Use  - on 3L NC, stable, no c/o SOB  - cont Symbicort, cont prednisone, PRN albuterol  - add Mucinex and Duonebs; pt is afebrile     DM Type II  - well controlled  - cont Metformin  - daily POC glucose     GERD  - cont Protonix     Chronic Ischemic Heart Disease  - last echo from 2016 with normal EF  - appears compensated  - low Na diet, daily weights  - on Lasix w/ K supplements     SSS  - s/p pacemaker     Hypothyroidism  - home dose of synthroid 125 mcg       Chronic Pain with Opioids Dependence  - cont PRN Norco     Previously enrolled in hospice    Sandor Jiménez PA-C 2:47 PM 7/13/2020

## 2020-07-13 NOTE — PLAN OF CARE
occlusion with cerebral infarction     2006       Risk:  Fall RiskTotal: 117  Angel Scale Angel Scale Score: 20  BVC Total: 1  Change in scores none. Changes to plan of Care  none    Status EXAM:   Status and Exam  Normal: No  Facial Expression: Flat  Affect: Inappropriate  Level of Consciousness: Alert  Mood:Normal: No  Mood: Depressed  Motor Activity:Normal: No  Motor Activity: Decreased  Interview Behavior: Cooperative  Preception: [place] to Person, Massiel Giacomo to Time, [place] to Place  Attention:Normal: No  Attention: Distractible  Thought Processes: Perseveration  Thought Content:Normal: Yes  Thought Content: Preoccupations  Hallucinations: None  Delusions: No  Memory:Normal: Yes  Memory: Confabulation, Poor Recent, Poor Remote  Insight and Judgment: No  Insight and Judgment: Poor Judgment, Poor Insight  Present Suicidal Ideation: No  Present Homicidal Ideation: No    Daily Assessment Last Entry:   Daily Sleep (WDL): Exceptions to WDL         Patient Currently in Pain: Denies  Daily Nutrition (WDL): Within Defined Limits    Patient Monitoring:  Frequency of Checks: 4 times per hour, close    Psychiatric Symptoms:   Depression Symptoms  Depression Symptoms: No problems reported or observed. Anxiety Symptoms  Anxiety Symptoms: No problems reported or observed. Irina Symptoms  Irina Symptoms: No problems reported or observed. Psychosis Symptoms  Delusion Type: No problems reported or observed.     Suicide Risk CSSR-S:  1) Within the past month, have you wished you were dead or wished you could go to sleep and not wake up? : Yes  2) Have you actually had any thoughts of killing yourself? : No  6) Have you ever done anything, started to do anything, or prepared to do anything to end your life?: No  Change in Result none Change in Plan of care none      EDUCATION:   Learner Progress Toward Treatment Goals: Reviewed results and recommendations of this team    Method: Individual    Outcome: Verbalized

## 2020-07-13 NOTE — PLAN OF CARE
Pt c/o back pain rest and reposition was ineffective oxycodone 10/325 given at 0642 for all over pain of 8 on 0-10 scale will monitor for effectiveness.

## 2020-07-13 NOTE — BH NOTE
Jessie Patricia was excused from 5 Psychoeducation Group, due to meeting with psychiatrist and OT during group time. CTRS provided Jessie Patricia with coping skill and leisure outlet resources, she may use at her leisure.      Lakshmi Joseph, [facility]

## 2020-07-13 NOTE — PLAN OF CARE
Pt. Is alert and oriented x 3, received medications, denies SI but does voice displeasure with living in current situation, good appetite, oxycodone 10/325 given for all over pain of 8 on 0-10 scale was effective, start Seroquel 150 mg at hs. Out of her room several times today and attended a couple groups.

## 2020-07-13 NOTE — PROGRESS NOTES
Management:   Social Network:   Stressors: time going by too slowly. Does not have enough activities to fill her day. Coping Skills: unable to identify coping skills       Pain  No  Rating:NA  Location:  Pain Medicine Status: Denies need      Cognition    A&O x4   Able to follow 2 step commands    Subjective  Patient lying supine in bed with no family present - no visitors present due to COVID-19 restrictions  Pt agreeable to this OT eval & tx. Upper Extremity ROM:    WFL,  pt able to perform all bed mobility, transfers, and gait without ROM limitation. Upper Extremity Strength:    Strength Assessment (measured on a 0-5 scale):    B UEs 3+/5     Upper Extremity Sensation    WFL    Upper Extremity Proprioception:  Impaired    Coordination and Tone  Impaired    Balance  Functional Sitting Balance:  WFL  Functional Standing Balance:Diminished    Bed mobility:    Supine to sit:   SBA  Sit to supine:   Not Tested  Rolling:    Not Tested  Scooting in sitting:  SBA  Scooting to head of bed:   Not Tested    Bridging:   Not Tested    Transfers:    Sit to stand:  CGA  Stand to sit:  CGA  Bed to chair:   CGA and with use of RW  Standard toilet: CGA and with use of RW  Bed to [facility]:  Not Tested    Dressing:      UE:   Not Tested  LE:    SBA to don briefs     Bathing:    UE:  Not Tested  LE:  CGA    Eating:   Not Tested    Toileting:  SBA    Activity Tolerance   Pt completed therapy session with SOB noted with activity  SpO2: 91% after functional mobility to bathroom. Dropped to 87% with pericare. Patient is on 3L of O2  HR: 100-122 bpm  BP:     Positioning Needs:   Sitting in community room with RN aware. Exercise / Activities Initiated:   N/A    Patient/Family Education:   Role of OT  Recommendations for DC  Energy conservation techniques  Safe RW use/hand placement  Oxygen tubing management    Assessment of Deficits: Pt seen for Occupational therapy evaluation in acute care setting.   Pt demonstrated decreased Activity tolerance, ADLs, IADLs, Balance , Bathing, Bed mobility, Dressing, Safety Awareness, Strength, Transfers, Cognition and Coping Skills. Pt functioning below baseline and will likely benefit from skilled occupational therapy services to maximize safety and independence. Goal(s) : To be met in 3 Visits:  1). Bed to toilet/BSC: Supervision    To be met in 5 Visits:  1). Supine to/from Sit:  Modified Independent  2). Upper Body Bathing:   Supervision  3). Lower Body Bathing:   SBA  4). Upper Body Dressing:  Supervision  5). Lower Body Dressing:  SBA  6). Pt to demonstrate UE exs x 15 reps with minimal cues    Rehabilitation Potential:  Fair for goals listed above. Strengths for achieving goals include: Pt motivated, PLOF and Pt cooperative  Barriers to achieving goals include:  Complexity of condition     Plan: To be seen 2-5 x/wk while in acute care setting for therapeutic exercises, bed mobility, transfers, dressing, bathing, family/patient education, ADL/IADL retraining, energy conservation training.        Seamus Mukherjee, OTR/L #[id number]      If patient discharges from this facility prior to next visit, this note will serve as the Discharge Summary

## 2020-07-13 NOTE — PROGRESS NOTES
Department of Psychiatry  Attending Progress Note    Admission Date:    7/9/2020    Chief complaint / Reason for Admission:  Altered mental status and suicidal ideation    Patient's chart was reviewed, case was discussed with nursing/OT/RT staff, and collaborated with  about the treatment plan. SUBJECTIVE:   Over last 24 hours:  Behavioral outbursts: No   Non-aggressive behavioral disturbance: No  Medication compliant: Yes  Need for seclusion/restraints: No  Sleeping adequately:  Improved  Appetite adequate: Yes  Attending groups: Yes    Patient is more clear and attentive today compared to on admission. She was able to articulate an awareness that she struggles with expressive aphasia and attention issues. We discussed her underlying vascular pathology, and the contribution of polypharmacy to these issues. Patient now denies SI. States that she \"couldn't do that to my children. \"  Also describes realizing \"I need to take my own advice\": has a granddaughter who struggles with opioid addiction who has needed to go to rehab multiple times. Patient has repeatedly told her to \"never give up\" and was thinking about applying this to herself. She also was able to discuss more reasonably the idea that she may indeed be unable to manage alone in her home, and needs to be more open to \"doing what I have to do,\" even if that means transitioning to long term care as has been suggested by her hospice agency. Finally, sleep has improved some with resumption of quetiapine, but patient has needed additional PRN trazodone each night.     Progressing overall: Improving  Suicidal ideation: Denies  Homicidal ideation: Denies  Medication side effects: No    ROS: Patient has new complaints: no    Current Medications Ordered:   QUEtiapine  150 mg Oral Nightly    guaiFENesin  600 mg Oral BID    ipratropium-albuterol  1 ampule Inhalation Q4H WA    clopidogrel  75 mg Oral Daily    divalproex  250 mg Oral BID DNR     Disposition:    -Housing: Own home  -Current outpatient follow-up: [facility]     Estimated length of stay: 3 to 5 days     Criteria for Discharge:  Not psychotic, not homicidal, not suicidal, behavioral disturbance under control, sleeping well, mood improved/stable, eating well, aftercare arranged. Total face to face time with patient was 30 minutes and more than 50 % of that time was spent counseling the patient on their symptoms, treatment and expected goals.     Cara Martinez MD  Staff Psychiatrist

## 2020-07-14 LAB
ANION GAP SERPL CALCULATED.3IONS-SCNC: 13 MMOL/L (ref 3–16)
BASOPHILS ABSOLUTE: 0.1 K/UL (ref 0–0.2)
BASOPHILS RELATIVE PERCENT: 1.4 %
BUN BLDV-MCNC: 23 MG/DL (ref 7–20)
CALCIUM SERPL-MCNC: 9.2 MG/DL (ref 8.3–10.6)
CHLORIDE BLD-SCNC: 93 MMOL/L (ref 99–110)
CO2: 33 MMOL/L (ref 21–32)
CREAT SERPL-MCNC: 1.1 MG/DL (ref 0.6–1.2)
EKG ATRIAL RATE: 81 BPM
EKG DIAGNOSIS: NORMAL
EKG P AXIS: 74 DEGREES
EKG P-R INTERVAL: 188 MS
EKG Q-T INTERVAL: 360 MS
EKG QRS DURATION: 74 MS
EKG QTC CALCULATION (BAZETT): 418 MS
EKG R AXIS: 54 DEGREES
EKG T AXIS: 68 DEGREES
EKG VENTRICULAR RATE: 81 BPM
EOSINOPHILS ABSOLUTE: 0.1 K/UL (ref 0–0.6)
EOSINOPHILS RELATIVE PERCENT: 1.8 %
GFR AFRICAN AMERICAN: 59
GFR NON-AFRICAN AMERICAN: 49
GLUCOSE BLD-MCNC: 131 MG/DL (ref 70–99)
GLUCOSE BLD-MCNC: 163 MG/DL (ref 70–99)
GLUCOSE BLD-MCNC: 169 MG/DL (ref 70–99)
GLUCOSE BLD-MCNC: 215 MG/DL (ref 70–99)
GLUCOSE BLD-MCNC: 216 MG/DL (ref 70–99)
HCT VFR BLD CALC: 37 % (ref 36–48)
HEMOGLOBIN: 11.7 G/DL (ref 12–16)
LYMPHOCYTES ABSOLUTE: 2.1 K/UL (ref 1–5.1)
LYMPHOCYTES RELATIVE PERCENT: 27.8 %
MCH RBC QN AUTO: 27.8 PG (ref 26–34)
MCHC RBC AUTO-ENTMCNC: 31.7 G/DL (ref 31–36)
MCV RBC AUTO: 87.7 FL (ref 80–100)
MONOCYTES ABSOLUTE: 0.7 K/UL (ref 0–1.3)
MONOCYTES RELATIVE PERCENT: 9.6 %
NEUTROPHILS ABSOLUTE: 4.6 K/UL (ref 1.7–7.7)
NEUTROPHILS RELATIVE PERCENT: 59.4 %
PDW BLD-RTO: 14.5 % (ref 12.4–15.4)
PERFORMED ON: ABNORMAL
PLATELET # BLD: 246 K/UL (ref 135–450)
PMV BLD AUTO: 7.3 FL (ref 5–10.5)
POTASSIUM REFLEX MAGNESIUM: 3.9 MMOL/L (ref 3.5–5.1)
PRO-BNP: 185 PG/ML (ref 0–124)
RBC # BLD: 4.22 M/UL (ref 4–5.2)
SODIUM BLD-SCNC: 139 MMOL/L (ref 136–145)
TROPONIN: <0.01 NG/ML
WBC # BLD: 7.7 K/UL (ref 4–11)

## 2020-07-14 PROCEDURE — 80048 BASIC METABOLIC PNL TOTAL CA: CPT

## 2020-07-14 PROCEDURE — 93010 ELECTROCARDIOGRAM REPORT: CPT | Performed by: INTERNAL MEDICINE

## 2020-07-14 PROCEDURE — 6370000000 HC RX 637 (ALT 250 FOR IP): Performed by: PSYCHIATRY & NEUROLOGY

## 2020-07-14 PROCEDURE — 6370000000 HC RX 637 (ALT 250 FOR IP): Performed by: NURSE PRACTITIONER

## 2020-07-14 PROCEDURE — 94640 AIRWAY INHALATION TREATMENT: CPT

## 2020-07-14 PROCEDURE — 85025 COMPLETE CBC W/AUTO DIFF WBC: CPT

## 2020-07-14 PROCEDURE — 2580000003 HC RX 258

## 2020-07-14 PROCEDURE — 6370000000 HC RX 637 (ALT 250 FOR IP): Performed by: PHYSICIAN ASSISTANT

## 2020-07-14 PROCEDURE — 1240000000 HC EMOTIONAL WELLNESS R&B

## 2020-07-14 PROCEDURE — 83880 ASSAY OF NATRIURETIC PEPTIDE: CPT

## 2020-07-14 PROCEDURE — 93005 ELECTROCARDIOGRAM TRACING: CPT | Performed by: PHYSICIAN ASSISTANT

## 2020-07-14 PROCEDURE — 94761 N-INVAS EAR/PLS OXIMETRY MLT: CPT

## 2020-07-14 PROCEDURE — 97535 SELF CARE MNGMENT TRAINING: CPT

## 2020-07-14 PROCEDURE — 99232 SBSQ HOSP IP/OBS MODERATE 35: CPT | Performed by: PHYSICIAN ASSISTANT

## 2020-07-14 PROCEDURE — 2580000003 HC RX 258: Performed by: PHYSICIAN ASSISTANT

## 2020-07-14 PROCEDURE — 97530 THERAPEUTIC ACTIVITIES: CPT

## 2020-07-14 PROCEDURE — 99233 SBSQ HOSP IP/OBS HIGH 50: CPT | Performed by: PSYCHIATRY & NEUROLOGY

## 2020-07-14 PROCEDURE — 2700000000 HC OXYGEN THERAPY PER DAY

## 2020-07-14 PROCEDURE — 36415 COLL VENOUS BLD VENIPUNCTURE: CPT

## 2020-07-14 PROCEDURE — 84484 ASSAY OF TROPONIN QUANT: CPT

## 2020-07-14 RX ORDER — QUETIAPINE FUMARATE 100 MG/1
100 TABLET, FILM COATED ORAL NIGHTLY
Status: DISCONTINUED | OUTPATIENT
Start: 2020-07-14 | End: 2020-07-17

## 2020-07-14 RX ORDER — DEXTROSE MONOHYDRATE 25 G/50ML
12.5 INJECTION, SOLUTION INTRAVENOUS PRN
Status: DISCONTINUED | OUTPATIENT
Start: 2020-07-14 | End: 2020-07-22 | Stop reason: HOSPADM

## 2020-07-14 RX ORDER — DEXTROSE MONOHYDRATE 50 MG/ML
100 INJECTION, SOLUTION INTRAVENOUS PRN
Status: DISCONTINUED | OUTPATIENT
Start: 2020-07-14 | End: 2020-07-22 | Stop reason: HOSPADM

## 2020-07-14 RX ORDER — SODIUM CHLORIDE 0.9 % (FLUSH) 0.9 %
SYRINGE (ML) INJECTION
Status: COMPLETED
Start: 2020-07-14 | End: 2020-07-14

## 2020-07-14 RX ORDER — 0.9 % SODIUM CHLORIDE 0.9 %
250 INTRAVENOUS SOLUTION INTRAVENOUS ONCE
Status: COMPLETED | OUTPATIENT
Start: 2020-07-14 | End: 2020-07-14

## 2020-07-14 RX ORDER — IPRATROPIUM BROMIDE AND ALBUTEROL SULFATE 2.5; .5 MG/3ML; MG/3ML
1 SOLUTION RESPIRATORY (INHALATION) 2 TIMES DAILY
Status: DISCONTINUED | OUTPATIENT
Start: 2020-07-14 | End: 2020-07-22 | Stop reason: HOSPADM

## 2020-07-14 RX ORDER — NICOTINE POLACRILEX 4 MG
15 LOZENGE BUCCAL PRN
Status: DISCONTINUED | OUTPATIENT
Start: 2020-07-14 | End: 2020-07-22 | Stop reason: HOSPADM

## 2020-07-14 RX ADMIN — Medication 2 PUFF: at 20:10

## 2020-07-14 RX ADMIN — CLOPIDOGREL BISULFATE 75 MG: 75 TABLET ORAL at 10:21

## 2020-07-14 RX ADMIN — IPRATROPIUM BROMIDE AND ALBUTEROL SULFATE 1 AMPULE: .5; 3 SOLUTION RESPIRATORY (INHALATION) at 07:58

## 2020-07-14 RX ADMIN — PANTOPRAZOLE SODIUM 40 MG: 40 TABLET, DELAYED RELEASE ORAL at 06:36

## 2020-07-14 RX ADMIN — DOCUSATE SODIUM 100 MG: 100 CAPSULE, LIQUID FILLED ORAL at 21:07

## 2020-07-14 RX ADMIN — Medication 2 PUFF: at 07:58

## 2020-07-14 RX ADMIN — PREDNISONE 7.5 MG: 5 TABLET ORAL at 10:26

## 2020-07-14 RX ADMIN — Medication 10 ML: at 15:29

## 2020-07-14 RX ADMIN — IPRATROPIUM BROMIDE AND ALBUTEROL SULFATE 1 AMPULE: .5; 3 SOLUTION RESPIRATORY (INHALATION) at 20:10

## 2020-07-14 RX ADMIN — DIVALPROEX SODIUM 250 MG: 250 TABLET, DELAYED RELEASE ORAL at 21:07

## 2020-07-14 RX ADMIN — METFORMIN HYDROCHLORIDE 1000 MG: 500 TABLET ORAL at 10:20

## 2020-07-14 RX ADMIN — POTASSIUM CHLORIDE 10 MEQ: 750 TABLET, EXTENDED RELEASE ORAL at 10:21

## 2020-07-14 RX ADMIN — DIVALPROEX SODIUM 250 MG: 250 TABLET, DELAYED RELEASE ORAL at 10:20

## 2020-07-14 RX ADMIN — GUAIFENESIN 600 MG: 600 TABLET, EXTENDED RELEASE ORAL at 21:07

## 2020-07-14 RX ADMIN — Medication 10 ML: at 12:46

## 2020-07-14 RX ADMIN — ERYTHROMYCIN 500 MG: 250 TABLET, FILM COATED ORAL at 21:06

## 2020-07-14 RX ADMIN — QUETIAPINE FUMARATE 100 MG: 100 TABLET ORAL at 21:07

## 2020-07-14 RX ADMIN — TRAZODONE HYDROCHLORIDE 25 MG: 50 TABLET ORAL at 00:01

## 2020-07-14 RX ADMIN — INSULIN LISPRO 1 UNITS: 100 INJECTION, SOLUTION INTRAVENOUS; SUBCUTANEOUS at 21:10

## 2020-07-14 RX ADMIN — DOCUSATE SODIUM 100 MG: 100 CAPSULE, LIQUID FILLED ORAL at 10:21

## 2020-07-14 RX ADMIN — SODIUM CHLORIDE 250 ML: 9 INJECTION, SOLUTION INTRAVENOUS at 15:29

## 2020-07-14 RX ADMIN — LEVOTHYROXINE SODIUM 125 MCG: 125 TABLET ORAL at 06:36

## 2020-07-14 RX ADMIN — HYDROCODONE BITARTRATE AND ACETAMINOPHEN 1 TABLET: 10; 325 TABLET ORAL at 21:30

## 2020-07-14 ASSESSMENT — PAIN SCALES - GENERAL
PAINLEVEL_OUTOF10: 2
PAINLEVEL_OUTOF10: 0
PAINLEVEL_OUTOF10: 8
PAINLEVEL_OUTOF10: 0

## 2020-07-14 NOTE — GROUP NOTE
Group Therapy Note    Date: 7/14/2020    Group Start Time: 6892  Group End Time: 74 [facility]        Group Therapy Note    Attendees: 4    Patient's Goal: to engage in Music Bingo intervention to increase socialization, improve memory recall, increase engagement in leisure, and improve overall quality of life. Notes: Agata Carrillo was excused from portion of group due to completing ADLs. Pt actively engaged while in attendance, practicing memory recall and was observed smiling, laughing, and dancing. Agata Carrillo participated in processing discussion. Pt reported enjoyment of activity. Status After Intervention:  Improved    Participation Level:  Active Listener and Interactive    Participation Quality: Appropriate, Attentive, Sharing and Supportive      Speech:  normal      Thought Process/Content: Linear      Affective Functioning: Constricted/Restricted      Mood: euthymic      Level of consciousness:  Alert and Attentive      Response to Learning: Able to verbalize current knowledge/experience, Able to verbalize/acknowledge new learning and Progressing to goal      Endings: None Reported    Modes of Intervention: Education, Support, Socialization, Problem-solving, Activity and Media      Discipline Responsible: Psychoeducational Specialist      Signature:  ANDREA Richter

## 2020-07-14 NOTE — PROGRESS NOTES
7.5 mg Oral Daily     PRN Meds:  glucose, dextrose, glucagon (rDNA), dextrose, trolamine salicylate, albuterol sulfate HFA, acetaminophen, haloperidol lactate **OR** haloperidol, traZODone, benztropine mesylate, magnesium hydroxide, aluminum & magnesium hydroxide-simethicone, HYDROcodone-acetaminophen, LORazepam **OR** [DISCONTINUED] LORazepam    CULTURES  None     RADIOLOGY    XR CHEST PORTABLE 7/9/2020   Final Result   No acute cardiopulmonary disease. CT Head WO Contrast 7/9/2020   Final Result   No acute intracranial abnormality.            Assessment/Plan:    Hallucinations  Depression  - cont mgmt per [facility]    Orthostatic Hypotension  Mild ANA  - Cr 1.1  - hold Lasix, stop Metformin, give 1x 250 cc NS bolus  - will repeat BMP and orthostatic BP tomorrow      Dysuria  - checked UA with reflex to cx - neg  - no signs of infection    Cough - improved  COPD - no AE  Chronic Hypoxic Respiratory Failure  Chronic Steroid Use  - on 3L NC, stable, no c/o SOB  - cont Symbicort, cont prednisone, PRN albuterol  - add Mucinex and Duonebs; pt is afebrile     DM Type II  - well controlled  - hold Metformin 2/2 above, add low dose SSI  - daily POC glucose     GERD  - cont Protonix     Chronic Ischemic Heart Disease  - last echo from 2016 with normal EF  - appears compensated  - low Na diet, daily weights  - on Lasix w/ K supplements, hold Lasix for now, cautions w/ IVF     SSS  - s/p pacemaker     Hypothyroidism  - home dose of synthroid 125 mcg       Chronic Pain with Opioids Dependence  - cont PRN Norco - add PRN parameters     Previously enrolled in hospice    Emily Mccollum PA-C 2:39 PM 7/14/2020

## 2020-07-14 NOTE — PROGRESS NOTES
Department of Psychiatry  Attending Progress Note    Admission Date:    7/9/2020    Chief complaint / Reason for Admission:  Altered mental status and suicidal ideation    Patient's chart was reviewed, case was discussed with nursing/OT/RT staff, and collaborated with  about the treatment plan. SUBJECTIVE:   Over last 24 hours:  Behavioral outbursts: No   Non-aggressive behavioral disturbance: No  Medication compliant: Yes  Need for seclusion/restraints: No  Sleeping adequately:  Improved  Appetite adequate: Yes  Attending groups: Yes    Pt had an uneventful day yesterday. This morning, however, when getting up to bathroom with RN, she was lightheaded and had an episode of near syncope. Orthostatic vitals were taken and positive. On assessment this morning she is again more inattentive, and struggling with greater word finding problems as well as thought derailment and loose associations. Struggled to describe her symptoms or provide coherent time courses. She was oriented, however, and did recall specifics of her admission to date. Several labs were obtained as was EKG. No acute EKG changes, troponin not elevated, no white count, and only mild elevation of BUN. Neuro exam reassuring, no dysarthria, nor focal deficits.     Progressing overall: Was improving but developed orthostatic hypotension and near syncope this AM.  Suicidal ideation: Denies  Homicidal ideation: Denies  Medication side effects: Orthostatic hypotension    ROS: Patient has new complaints: As above    Current Medications Ordered:   sodium chloride flush        QUEtiapine  100 mg Oral Nightly    guaiFENesin  600 mg Oral BID    ipratropium-albuterol  1 ampule Inhalation Q4H WA    clopidogrel  75 mg Oral Daily    divalproex  250 mg Oral BID    erythromycin base  500 mg Oral Nightly    pantoprazole  40 mg Oral QAM AC    docusate sodium  100 mg Oral BID    budesonide-formoterol  2 puff Inhalation BID    furosemide  20 mg Oral Daily    levothyroxine  125 mcg Oral Daily    metFORMIN  1,000 mg Oral Daily with breakfast    potassium chloride  10 mEq Oral Daily with breakfast    predniSONE  7.5 mg Oral Daily      PRN Meds: trolamine salicylate, albuterol sulfate HFA, acetaminophen, haloperidol lactate **OR** haloperidol, traZODone, benztropine mesylate, magnesium hydroxide, aluminum & magnesium hydroxide-simethicone, HYDROcodone-acetaminophen, LORazepam **OR** [DISCONTINUED] LORazepam     Objective:     PE:    /66   Pulse 101   Temp 96.9 °F (36.1 °C) (Temporal)   Resp 18   Ht 5' 2\" (1.575 m)   Wt 184 lb 14.4 oz (83.9 kg)   SpO2 100%   BMI 33.82 kg/m²       Motor / Gait: No significant PMR/PMA, nml tone, no involuntary movements, gait aided by walker     Mental Status Examination:    Appearance: WF, appears stated age, wearing hospital gown, improved grooming and hygiene  Behavior/Attitude toward examiner:  Cooperative, Again inattentive, limtedEC  Speech:  Normal volume, amount, again halting, +word finding difficulty  Mood:  Anxious  Affect: Congruent  Thought processes:  Loose  Thought Content: Denies SI, denies HI, no delusions voiced, no obsessions, no confabulation today  Perceptions: Denies active AVH, not actively  RTIS  Attention: Impaired  Abstraction: [place]  Cognition: Average AYDE, Alert and oriented to person, place, time, and situation, recall improved  Insight: Improved insight   Judgment: Improved judgment      LAB: Reviewed labs from last 24 hours      Assessment and Plan:     Diagnoses:   Primary Psychiatric (DSM V) Diagnosis: Delirium due to polypharmacy, mixed level of activity, resolving  Secondary Psychiatric (DSM V) Diagnoses: Bipolar disorder, currently depressed, moderate  Chemical Dependency Diagnoses: Tobacco use disorder, in remission  Active Medical Diagnoses:       Patient Active Problem List   Diagnosis    Pneumonia    [facility] obstructive pulmonary disease [facility] [address]    Type II diabetes mellitus, well controlled ([facility] [address]    CAD (coronary artery disease)    Acute-on-chronic respiratory failure (HCC)    Sepsis (HCC)    Pleural effusion    Hyperkalemia    CAP (community acquired pneumonia)    Chest pain    Chest pressure    Shortness of breath    History of coronary artery disease    MDD (major depressive disorder), single episode    Chronic ischemic heart disease    Pacemaker    Chronic respiratory failure with hypoxia (HCC)     All conditions detailed above are being treated while patient is hospitalized.      Tx plan: Generally: prevent self injury/aggression, stabilize mood/anxiety/psychotic/behavioral disturbance, establish/maintain aftercare, increase coping mechanisms, improve medication compliance.  All conditions present on admission are being treated while pt is hospitalized.      Legal Status: Voluntary     Primary Psychiatric Issues:  1. Delirium; Bipolar depression:  -D/C'd adderall. No indication and risk of contributing to mood instability and cycling. -D/C'd donepezil. Patient does not have a formal AD diagnosis, and AChIs are contraindicated in severe COPD. -Continue divalproex 250 mg BID. -Increased quetiapine appears to have led to orthostatic hypotension.   -Will reduce dose back to 100 mg qhs.     Chemical Dependency Issues:  Patient no longer smoker.     Function:  -Consult physical therapy  -Consult occupational therapy  -Falls precautions     Medical Problems:  Internal medicine has been consulted. Appreciate recs.     Code Status: DNR     Disposition:    -Housing: Own home  -Current outpatient follow-up: [facility]     Estimated length of stay: 3 to 5 days     Criteria for Discharge:  Not psychotic, not homicidal, not suicidal, behavioral disturbance under control, sleeping well, mood improved/stable, eating well, aftercare arranged.      Total face to face time with patient was 45 minutes and more than 50 % of that time was spent counseling the patient on their symptoms, treatment and expected goals.     Ezra Ashley MD  Staff Psychiatrist

## 2020-07-14 NOTE — PROGRESS NOTES
RESPIRATORY THERAPY ASSESSMENT     Name:  [address] [place] [address] Record Number:  [id number]  Age: 70 y.o. Gender: female  : 1945  Today's Date:  2020  Room:  [facility]2209-01     Assessment      Is the patient being admitted for a COPD or Asthma exacerbation?  No   (If yes the patient will be seen every 4 hours for the first 24 hours and then reassessed)     Patient Admission Diagnosis        Allergies       Allergies   Allergen Reactions    Buspar [Buspirone Hcl]          Minimum Predicted Vital Capacity:                Actual Vital Capacity:                       Pulmonary History: COPD  Home Oxygen Therapy:   3 lpm  Home Respiratory Therapy: Albuterol, Breo   Current Respiratory Therapy:  Albuterol prn, Symbicort BID  Treatment Type: MDI  Medications: Budesonide/Formoterol     Respiratory Severity Index(RSI)   Patients with orders for inhalation medications, oxygen, or any therapeutic treatment modality will be placed on Respiratory Protocol. They will be assessed with the first treatment and at least every 72 hours thereafter. The following severity scale will be used to determine frequency of treatment intervention.     Smoking History: Pulmonary Disease or Smoking History, Greater than 15 pack year = 2    Social History  Social History            Tobacco Use    Smoking status: Former Smoker       Packs/day: 3.00       Years: 47.00       Pack years: 141.00       Last attempt to quit: 2007       Years since quittin.5    Smokeless tobacco: Never Used   Substance Use Topics    Alcohol use: No       Alcohol/week: 0.0 standard drinks    Drug use:  No        Recent Surgical History: None = 0  Past Surgical History  Past Surgical History         Past Surgical History:   Procedure Laterality Date    APPENDECTOMY         taken out with gallbladder    CARDIAC PACEMAKER PLACEMENT        CARDIAC SURGERY         pacemaker     CARDIAC SURGERY         STENT    CHOLECYSTECTOMY        COLONOSCOPY        DIAGNOSTIC CARDIAC CATH LAB PROCEDURE        HYSTERECTOMY   1975     partial    OTHER SURGICAL HISTORY   5/28/13     quadroscopy    PACEMAKER INSERTION        PACEMAKER PLACEMENT        TONSILLECTOMY        TUBAL LIGATION        VASCULAR SURGERY         stent placement           Level of Consciousness: Alert, Oriented, and Cooperative = 0     Level of Activity: Walking with assistance = 1     Respiratory Pattern: Regular Pattern; RR 8-20 = 0     Breath Sounds: Diminshed bilaterally and/or crackles = 2     Sputum   ,  ,    Cough: Strong, spontaneous, non-productive = 0     Vital Signs   /64   Pulse 92   Temp 98 °F (36.1 °C) (Oral)   Resp 18   Ht 5' 2\" (1.575 m)   Wt 178 lb 3.2 oz (80.8 kg)   SpO2 98%   BMI 32.59 kg/m²   SPO2 (COPD values may differ): 88-89% on room air or greater than 92% on FiO2 28- 35% = 2     Peak Flow (asthma only): not applicable = 0     RSI: 7-8 = BID and Q4HPRN (every four hours as needed) for dyspnea           Plan         Goals:  Medication delivery      Patient/caregiver was educated on the proper method of use for Respiratory Care Devices: Yes       Level of patient/caregiver understanding able to:   ? Verbalize understanding   ? Demonstrate understanding       ? Teach back        ? Needs reinforcement       ? No available caregiver               ? Other:     Response to education:  Good      Is patient being placed on Home Treatment Regimen? No      Does the patient have everything they need prior to discharge? NA      Comments:  Chart reviewed, patient assessed     Plan of Care: Albuterol BID, Albuterol prn, Symbicort BID          Respiratory Protocol Guidelines      1. Assessment and treatment by Respiratory Therapy will be initiated for medication and therapeutic interventions upon initiation of aerosolized medication. 2. Physician will be contacted for respiratory rate (RR) greater than 35 breaths per minute.  Therapy will be held for heart rate (HR) greater than 140 beats per minute, pending direction from physician. 3. Bronchodilators will be administered via Metered Dose Inhaler (MDI) with spacer when the following criteria are met:  a. Alert and cooperative     b. HR < 140 bpm  c. RR < 30 bpm                d. Can demonstrate a 2-3 second inspiratory hold  4. Bronchodilators will be administered via Hand Held Nebulizer ANTOINETTE [facility]) to patients when ANY of the following criteria are met  a. Incognizant or uncooperative          b. Patients treated with HHN at Home        c. Unable to demonstrate proper use of MDI with spacer     d. RR > 30 bpm   5. Bronchodilators will be delivered via Metered Dose Inhaler (MDI), HHN, Aerogen to intubated patients on mechanical ventilation. 6. Inhalation medication orders will be delivered and/or substituted as outlined below.     Aerosolized Medications Ordering and Administration Guidelines:     1. All Medications will be ordered by a physician, and their frequency and/or modality will be adjusted as defined by the patients Respiratory Severity Index (RSI) score. 2. If the patient does not have documented COPD, consider discontinuing anticholinergics when RSI is less than 9.  3. If the bronchospasm worsens (increased RSI), then the bronchodilator frequency can be increased to a maximum of every 4 hours. If greater than every 4 hours is required, the physician will be contacted. 4. If the bronchospasm improves, the frequency of the bronchodilator can be decreased, based on the patient's RSI, but not less than home treatment regimen frequency. 5. Bronchodilator(s) will be discontinued if patient has a RSI less than 9 and has received no scheduled or as needed treatment for 72  Hrs.     Patients Ordered on a Mucolytic Agent:     1. Must always be administered with a bronchodilator.     2.  Discontinue if patient experiences worsened bronchospasm, or secretions have lessened to the point that the patient is able to clear them with a cough.     Anti-inflammatory and Combination Medications:     1.  If the patient lacks prior history of lung disease, is not using inhaled anti-inflammatory medication at home, and lacks wheezing by examination or by history for at least 24 hours, contact physician for possible discontinuation

## 2020-07-14 NOTE — PLAN OF CARE
Pt alert and oriented sitting in the common area at shift change. Slight confusion noted at times. She is pleasant and cooperative with care. She is sad about her discharge, but states \"It's in Gods hands\". She states she will take one day at a time, but is hopeful for the future. She denies SI/HI/AVH.

## 2020-07-14 NOTE — BH NOTE
SOL faxed facesheet, Psych H&P, Med H&P, labs, and OT to [facility]. Kimber Rebekah stated that she will still need SOL to send orders for skilled nursing, home health aide, and PT/OT if needed.              CHANTEL Garcia

## 2020-07-14 NOTE — PROGRESS NOTES
after ambulating from bathroom. Bed to Chair:  Assist of 2-CGA of one, follow with w/c by another ambulating with RW  Standard toilet:   Supervision    Activity Tolerance   Pt completed therapy session with Dizziness noted with sitting/standing at times  SpO2: 95% on 2L NC  HR: 101  BP: 104/58  Unable to get a reading with pt in standing at sink. Pt began to lean on sink and having difficulty answering questions,   After ambulating back to bed:  SpO2: 90% on 2L  HR:92  BP: 132/69  Performed orthostatic readings with nursing with 20/16 drops with standing-see nursing notes for readings    Therapeutic Exercise: NA    Patient Education:   Role of OT, ADL retraining and Energy conservation, breathing technique    Positioning Needs:   Reclined in chair with needs met in common room with nurse present    Family Present:  No    Assessment: Pt tolerated tx session well initially, but had several episodes of reported feeling of her body \"being outside of myself\" and temporary loss of peripheral vision at one point in sitting after ambulation into common room. Recommended pt have w/c follow and assist with ambulation at this time. Pt should continue to benefit from skilled OT tx to maximize independence so that she may eventually return home with the least amount of assistance. GOALS  To be met in 3 Visits:  1). Bed to toilet/BSC: Supervision (Goal Met 7/1420)     To be met in 5 Visits:  1). Supine to/from Sit:             Modified Independent  2). Upper Body Bathing:         Supervision  3). Lower Body Bathing:         SBA  4). Upper Body Dressing:       Supervision  5). Lower Body Dressing:       SBA (Setup needed 7/14/20)  6).  Pt to demonstrate UE exs x 15 reps with minimal cues      Plan: cont with Crystal Cordova MS, OTR/L  #[id number]      If patient discharges from this facility prior to next visit, this note will serve as the Discharge Summary

## 2020-07-14 NOTE — BH NOTE
SOL called [employer] and spoke with Beni. Beni stated that she was not aware of pt prior to today; however stated her boss may be aware a referral. Beni stated they will need to verify pt's insurance and approve her for services first. SOL agreed to send requested documentation to Beni to see if the pt qualifies for services.             CHANTEL Garcia

## 2020-07-14 NOTE — BH NOTE
Pt slept most of the night. She was up 3 times for various requests including juice and to go to the bathroom. She was instructed to call for assist as she had taken Trazodone for sleep. Her gait was steady using her walker.

## 2020-07-14 NOTE — BH NOTE
No complaints of, or observed episodes of impaired attention or loss of vision this afternoon. PIV placed in R forearm, ordered fluids running and tolerating well. Ambulation with staff assistance for short distances to restroom, tolerating well.

## 2020-07-15 LAB
ANION GAP SERPL CALCULATED.3IONS-SCNC: 9 MMOL/L (ref 3–16)
BUN BLDV-MCNC: 25 MG/DL (ref 7–20)
CALCIUM SERPL-MCNC: 9.6 MG/DL (ref 8.3–10.6)
CHLORIDE BLD-SCNC: 96 MMOL/L (ref 99–110)
CO2: 35 MMOL/L (ref 21–32)
CREAT SERPL-MCNC: 1 MG/DL (ref 0.6–1.2)
GFR AFRICAN AMERICAN: >60
GFR NON-AFRICAN AMERICAN: 54
GLUCOSE BLD-MCNC: 130 MG/DL (ref 70–99)
GLUCOSE BLD-MCNC: 142 MG/DL (ref 70–99)
GLUCOSE BLD-MCNC: 161 MG/DL (ref 70–99)
GLUCOSE BLD-MCNC: 181 MG/DL (ref 70–99)
GLUCOSE BLD-MCNC: 198 MG/DL (ref 70–99)
PERFORMED ON: ABNORMAL
POTASSIUM SERPL-SCNC: 4.5 MMOL/L (ref 3.5–5.1)
SODIUM BLD-SCNC: 140 MMOL/L (ref 136–145)

## 2020-07-15 PROCEDURE — 36415 COLL VENOUS BLD VENIPUNCTURE: CPT

## 2020-07-15 PROCEDURE — 6370000000 HC RX 637 (ALT 250 FOR IP): Performed by: PHYSICIAN ASSISTANT

## 2020-07-15 PROCEDURE — 1240000000 HC EMOTIONAL WELLNESS R&B

## 2020-07-15 PROCEDURE — 6370000000 HC RX 637 (ALT 250 FOR IP): Performed by: PSYCHIATRY & NEUROLOGY

## 2020-07-15 PROCEDURE — 99232 SBSQ HOSP IP/OBS MODERATE 35: CPT | Performed by: PHYSICIAN ASSISTANT

## 2020-07-15 PROCEDURE — 99233 SBSQ HOSP IP/OBS HIGH 50: CPT | Performed by: PSYCHIATRY & NEUROLOGY

## 2020-07-15 PROCEDURE — 94640 AIRWAY INHALATION TREATMENT: CPT

## 2020-07-15 PROCEDURE — 2700000000 HC OXYGEN THERAPY PER DAY

## 2020-07-15 PROCEDURE — 94761 N-INVAS EAR/PLS OXIMETRY MLT: CPT

## 2020-07-15 PROCEDURE — 2580000003 HC RX 258: Performed by: PHYSICIAN ASSISTANT

## 2020-07-15 PROCEDURE — 80048 BASIC METABOLIC PNL TOTAL CA: CPT

## 2020-07-15 PROCEDURE — 6370000000 HC RX 637 (ALT 250 FOR IP): Performed by: NURSE PRACTITIONER

## 2020-07-15 RX ORDER — 0.9 % SODIUM CHLORIDE 0.9 %
250 INTRAVENOUS SOLUTION INTRAVENOUS ONCE
Status: COMPLETED | OUTPATIENT
Start: 2020-07-15 | End: 2020-07-15

## 2020-07-15 RX ORDER — SODIUM CHLORIDE 0.9 % (FLUSH) 0.9 %
SYRINGE (ML) INJECTION
Status: DISPENSED
Start: 2020-07-15 | End: 2020-07-16

## 2020-07-15 RX ADMIN — Medication 2 PUFF: at 08:27

## 2020-07-15 RX ADMIN — PANTOPRAZOLE SODIUM 40 MG: 40 TABLET, DELAYED RELEASE ORAL at 08:54

## 2020-07-15 RX ADMIN — DIVALPROEX SODIUM 250 MG: 250 TABLET, DELAYED RELEASE ORAL at 08:53

## 2020-07-15 RX ADMIN — LEVOTHYROXINE SODIUM 125 MCG: 125 TABLET ORAL at 08:54

## 2020-07-15 RX ADMIN — CLOPIDOGREL BISULFATE 75 MG: 75 TABLET ORAL at 08:52

## 2020-07-15 RX ADMIN — TRAZODONE HYDROCHLORIDE 25 MG: 50 TABLET ORAL at 20:10

## 2020-07-15 RX ADMIN — POTASSIUM CHLORIDE 10 MEQ: 750 TABLET, EXTENDED RELEASE ORAL at 08:53

## 2020-07-15 RX ADMIN — DIVALPROEX SODIUM 250 MG: 250 TABLET, DELAYED RELEASE ORAL at 20:10

## 2020-07-15 RX ADMIN — IPRATROPIUM BROMIDE AND ALBUTEROL SULFATE 1 AMPULE: .5; 3 SOLUTION RESPIRATORY (INHALATION) at 08:25

## 2020-07-15 RX ADMIN — SODIUM CHLORIDE 250 ML: 9 INJECTION, SOLUTION INTRAVENOUS at 15:50

## 2020-07-15 RX ADMIN — Medication 2 PUFF: at 19:38

## 2020-07-15 RX ADMIN — IPRATROPIUM BROMIDE AND ALBUTEROL SULFATE 1 AMPULE: .5; 3 SOLUTION RESPIRATORY (INHALATION) at 19:38

## 2020-07-15 RX ADMIN — TROLAMINE SALICYLATE: 10 CREAM TOPICAL at 11:26

## 2020-07-15 RX ADMIN — GUAIFENESIN 600 MG: 600 TABLET, EXTENDED RELEASE ORAL at 20:10

## 2020-07-15 RX ADMIN — DOCUSATE SODIUM 100 MG: 100 CAPSULE, LIQUID FILLED ORAL at 08:53

## 2020-07-15 RX ADMIN — GUAIFENESIN 600 MG: 600 TABLET, EXTENDED RELEASE ORAL at 08:52

## 2020-07-15 RX ADMIN — DOCUSATE SODIUM 100 MG: 100 CAPSULE, LIQUID FILLED ORAL at 20:10

## 2020-07-15 RX ADMIN — HYDROCODONE BITARTRATE AND ACETAMINOPHEN 1 TABLET: 10; 325 TABLET ORAL at 21:34

## 2020-07-15 RX ADMIN — INSULIN LISPRO 1 UNITS: 100 INJECTION, SOLUTION INTRAVENOUS; SUBCUTANEOUS at 20:11

## 2020-07-15 RX ADMIN — HYDROCODONE BITARTRATE AND ACETAMINOPHEN 1 TABLET: 10; 325 TABLET ORAL at 12:03

## 2020-07-15 RX ADMIN — ERYTHROMYCIN 500 MG: 250 TABLET, FILM COATED ORAL at 20:17

## 2020-07-15 RX ADMIN — PREDNISONE 7.5 MG: 5 TABLET ORAL at 08:52

## 2020-07-15 ASSESSMENT — PAIN DESCRIPTION - PAIN TYPE: TYPE: CHRONIC PAIN

## 2020-07-15 ASSESSMENT — PAIN SCALES - GENERAL
PAINLEVEL_OUTOF10: 0
PAINLEVEL_OUTOF10: 0
PAINLEVEL_OUTOF10: 8
PAINLEVEL_OUTOF10: 0
PAINLEVEL_OUTOF10: 8

## 2020-07-15 ASSESSMENT — PAIN DESCRIPTION - ONSET: ONSET: ON-GOING

## 2020-07-15 ASSESSMENT — PAIN DESCRIPTION - FREQUENCY: FREQUENCY: INTERMITTENT

## 2020-07-15 ASSESSMENT — PAIN DESCRIPTION - ORIENTATION: ORIENTATION: LOWER

## 2020-07-15 ASSESSMENT — PAIN DESCRIPTION - DESCRIPTORS: DESCRIPTORS: ACHING

## 2020-07-15 ASSESSMENT — PAIN DESCRIPTION - LOCATION: LOCATION: BACK

## 2020-07-15 ASSESSMENT — PAIN - FUNCTIONAL ASSESSMENT: PAIN_FUNCTIONAL_ASSESSMENT: ACTIVITIES ARE NOT PREVENTED

## 2020-07-15 ASSESSMENT — PAIN DESCRIPTION - PROGRESSION: CLINICAL_PROGRESSION: GRADUALLY WORSENING

## 2020-07-15 NOTE — BH NOTE
Writer spoke with Maegan King, with [facility] [phone], regarding home health order needs for continuation of home health services after transfer from home hospice care. Writer informed physician of order needs. Order was placed by physician and faxed to Maegan King at [phone]. Faxed was confirmed as sent.     Dylan Flores MSW, LSW

## 2020-07-15 NOTE — PLAN OF CARE
Pt is alert and oriented thus far this shift. She spoke with her sister on the phone and cried. She was weepy talking about it later. She apparently has some family dynamics with one of her daughters whom she has not spoke to for a year. She called the daughter this evening and left a message for her. She requested Norco at [facility] and it was administered per PRN order. It was effective. Her BP was 117/65. She has been unsteady this evening when standing still. She has a steady gait when walking with a walker, but sways when standing still. She is instructed to call for/wait for assist with any ambulation. She has been compliant. She has been taking fluids well. She has had 480ml since 1900. Her FSBS was 163 mg/dl and 1 unit of Novalog was administered.

## 2020-07-15 NOTE — PROGRESS NOTES
haloperidol, traZODone, benztropine mesylate, magnesium hydroxide, aluminum & magnesium hydroxide-simethicone, HYDROcodone-acetaminophen, LORazepam **OR** [DISCONTINUED] LORazepam     Objective:     PE:    /63   Pulse 73   Temp 97.9 °F (36.6 °C) (Oral)   Resp 16   Ht 5' 2\" (1.575 m)   Wt 186 lb 4.8 oz (84.5 kg)   SpO2 98%   BMI 34.07 kg/m²       Motor / Gait: No significant PMR/PMA, nml tone, no involuntary movements, gait aided by walker     Mental Status Examination:    Appearance: WF, appears stated age, wearing hospital gown, improved grooming and hygiene  Behavior/Attitude toward examiner:  Cooperative, improved attention, improvedEC  Speech:  Normal volume, amount, not halting, +word finding difficulty  Mood:  \"Better than yesterday\"  Affect:  Constricted  Thought processes:  Much more linear  Thought Content: Denies SI, denies HI, no delusions voiced, no obsessions, no confabulation today  Perceptions: Denies active AVH, not actively  RTIS  Attention: Improved  Abstraction: [place]  Cognition: Average AYDE, Alert and oriented to person, place, time, and situation, recall improved  Insight: Improved insight   Judgment: Improved judgment      LAB: Reviewed labs from last 24 hours      Assessment and Plan:     Diagnoses:   Primary Psychiatric (DSM V) Diagnosis: Delirium due to polypharmacy, mixed level of activity, resolving  Secondary Psychiatric (DSM V) Diagnoses: Bipolar disorder, currently depressed, moderate  Chemical Dependency Diagnoses: Tobacco use disorder, in remission  Active Medical Diagnoses:       Patient Active Problem List   Diagnosis    Pneumonia    Chronic obstructive pulmonary disease [facility] [address]    Type II diabetes mellitus, well controlled ([facility] [address]    CAD (coronary artery disease)    Acute-on-chronic respiratory failure ([address]    Sepsis ([facility] [address]    Pleural effusion    Hyperkalemia    CAP (community acquired pneumonia)    Chest pain    Chest pressure    Shortness of breath    History of coronary artery disease    MDD (major depressive disorder), single episode    Chronic ischemic heart disease    Pacemaker    Chronic respiratory failure with hypoxia (HCC)     All conditions detailed above are being treated while patient is hospitalized.      Tx plan: Generally: prevent self injury/aggression, stabilize mood/anxiety/psychotic/behavioral disturbance, establish/maintain aftercare, increase coping mechanisms, improve medication compliance.  All conditions present on admission are being treated while pt is hospitalized.      Legal Status: Voluntary     Primary Psychiatric Issues:  1. Delirium; Bipolar depression:  -D/C'd adderall. No indication and risk of contributing to mood instability and cycling. -D/C'd donepezil. Patient does not have a formal AD diagnosis, and AChIs are contraindicated in severe COPD. -Continue divalproex 250 mg BID. -Increased quetiapine appears to have led to orthostatic hypotension.   -Reduced dose back to 100 mg qhs.     Chemical Dependency Issues:  Patient no longer smoker.     Function:  -Consult physical therapy  -Consult occupational therapy  -Falls precautions     Medical Problems:  Internal medicine has been consulted. Appreciate recs.     Code Status: DNR     Disposition:    -Housing: Own home  -Current outpatient follow-up: [facility]     Estimated length of stay: 3 to 5 days     Criteria for Discharge:  Not psychotic, not homicidal, not suicidal, behavioral disturbance under control, sleeping well, mood improved/stable, eating well, aftercare arranged. Total face to face time with patient was \35 minutes and more than 50 % of that time was spent counseling the patient on their symptoms, treatment and expected goals.     Ezra Ashley MD  Staff Psychiatrist

## 2020-07-15 NOTE — PROGRESS NOTES
Progress Note    Admit Date:  7/9/2020    Subjective:  Ms. Brandie Burleson reports her cough has somewhat improved. Does continue to c/o episodes of lightheadedness with ambulation. No falls. Objective:   /63   Pulse 73   Temp 97.9 °F (36.6 °C) (Oral)   Resp 16   Ht 5' 2\" (1.575 m)   Wt 186 lb 4.8 oz (84.5 kg)   SpO2 98%   BMI 34.07 kg/m²          Intake/Output Summary (Last 24 hours) at 7/15/2020 1510  Last data filed at 7/15/2020 0874  Gross per 24 hour   Intake 480 ml   Output --   Net 480 ml       Physical Exam:  Gen: No distress. Alert. Elderly  female, sitting up in bed  Eyes:  No sclera icterus. No conjunctival injection. Neck:  Trachea midline. Resp: No accessory muscle use. No crackles. No wheezes. No rhonchi. supp O2  CV: Regular rate. Regular rhythm. No murmur. No rub. No edema. GI: Soft, Non-tender. Non-distended. Normal bowel sounds. Skin: Warm and dry. No rash on exposed extremities. M/S: No cyanosis. No clubbing. Ambulates with walker. Neuro: Awake. Grossly nonfocal, CN II-XII intact    Psych: Oriented x 3. Defer to psychiatry.     Scheduled Meds:   QUEtiapine  100 mg Oral Nightly    insulin lispro  0-6 Units Subcutaneous TID WC    insulin lispro  0-3 Units Subcutaneous Nightly    ipratropium-albuterol  1 ampule Inhalation BID    guaiFENesin  600 mg Oral BID    clopidogrel  75 mg Oral Daily    divalproex  250 mg Oral BID    erythromycin base  500 mg Oral Nightly    pantoprazole  40 mg Oral QAM AC    docusate sodium  100 mg Oral BID    budesonide-formoterol  2 puff Inhalation BID    [Held by provider] furosemide  20 mg Oral Daily    levothyroxine  125 mcg Oral Daily    potassium chloride  10 mEq Oral Daily with breakfast    predniSONE  7.5 mg Oral Daily     PRN Meds:  glucose, dextrose, glucagon (rDNA), dextrose, trolamine salicylate, albuterol sulfate HFA, acetaminophen, haloperidol lactate **OR** haloperidol, traZODone, benztropine mesylate, magnesium hydroxide, aluminum & magnesium hydroxide-simethicone, HYDROcodone-acetaminophen, LORazepam **OR** [DISCONTINUED] LORazepam    CULTURES  None     RADIOLOGY    XR CHEST PORTABLE 7/9/2020   Final Result   No acute cardiopulmonary disease. CT Head WO Contrast 7/9/2020   Final Result   No acute intracranial abnormality.            Assessment/Plan:    Hallucinations  Depression  - cont mgmt per [facility]    Orthostatic Hypotension  Mild ANA  - Cr 1.1  - hold Lasix, stop Metformin, s/p 1x 250 cc NS bolus  - repeat BMP - Cr 1, continues to c/o lightheadedness w/ ambulation  - will give 1 additional 250 cc bolus and repeat BMP tomorrow     Dysuria  - checked UA with reflex to cx - neg  - no signs of infection    Cough - improved  COPD - no AE  Chronic Hypoxic Respiratory Failure  Chronic Steroid Use  - on 3L NC, stable, no c/o SOB  - cont Symbicort, cont prednisone, PRN albuterol  - add Mucinex and Duonebs; pt is afebrile     DM Type II  - well controlled  - hold Metformin 2/2 above, add low dose SSI  - daily POC glucose     GERD  - cont Protonix     Chronic Ischemic Heart Disease  - last echo from 2016 with normal EF  - appears compensated  - low Na diet, daily weights  - on Lasix w/ K supplements, hold Lasix for now, cautions w/ IVF     SSS  - s/p pacemaker     Hypothyroidism  - home dose of synthroid 125 mcg       Chronic Pain with Opioids Dependence  - cont PRN Norco - add PRN parameters     Previously enrolled in hospice    Malcolm Vásquez PA-C 3:10 PM 7/15/2020

## 2020-07-16 LAB
ANION GAP SERPL CALCULATED.3IONS-SCNC: 11 MMOL/L (ref 3–16)
BUN BLDV-MCNC: 25 MG/DL (ref 7–20)
CALCIUM SERPL-MCNC: 9.1 MG/DL (ref 8.3–10.6)
CHLORIDE BLD-SCNC: 93 MMOL/L (ref 99–110)
CO2: 31 MMOL/L (ref 21–32)
CREAT SERPL-MCNC: 1 MG/DL (ref 0.6–1.2)
GFR AFRICAN AMERICAN: >60
GFR NON-AFRICAN AMERICAN: 54
GLUCOSE BLD-MCNC: 109 MG/DL (ref 70–99)
GLUCOSE BLD-MCNC: 150 MG/DL (ref 70–99)
GLUCOSE BLD-MCNC: 182 MG/DL (ref 70–99)
GLUCOSE BLD-MCNC: 185 MG/DL (ref 70–99)
GLUCOSE BLD-MCNC: 236 MG/DL (ref 70–99)
PERFORMED ON: ABNORMAL
POTASSIUM SERPL-SCNC: 4.2 MMOL/L (ref 3.5–5.1)
SODIUM BLD-SCNC: 135 MMOL/L (ref 136–145)

## 2020-07-16 PROCEDURE — 94640 AIRWAY INHALATION TREATMENT: CPT

## 2020-07-16 PROCEDURE — 99233 SBSQ HOSP IP/OBS HIGH 50: CPT | Performed by: PSYCHIATRY & NEUROLOGY

## 2020-07-16 PROCEDURE — 2700000000 HC OXYGEN THERAPY PER DAY

## 2020-07-16 PROCEDURE — 80048 BASIC METABOLIC PNL TOTAL CA: CPT

## 2020-07-16 PROCEDURE — 6370000000 HC RX 637 (ALT 250 FOR IP): Performed by: PSYCHIATRY & NEUROLOGY

## 2020-07-16 PROCEDURE — 2580000003 HC RX 258: Performed by: PHYSICIAN ASSISTANT

## 2020-07-16 PROCEDURE — 1240000000 HC EMOTIONAL WELLNESS R&B

## 2020-07-16 PROCEDURE — 2580000003 HC RX 258

## 2020-07-16 PROCEDURE — 99232 SBSQ HOSP IP/OBS MODERATE 35: CPT | Performed by: PHYSICIAN ASSISTANT

## 2020-07-16 PROCEDURE — 97535 SELF CARE MNGMENT TRAINING: CPT

## 2020-07-16 PROCEDURE — 6370000000 HC RX 637 (ALT 250 FOR IP): Performed by: PHYSICIAN ASSISTANT

## 2020-07-16 PROCEDURE — 94761 N-INVAS EAR/PLS OXIMETRY MLT: CPT

## 2020-07-16 PROCEDURE — 36415 COLL VENOUS BLD VENIPUNCTURE: CPT

## 2020-07-16 PROCEDURE — 97530 THERAPEUTIC ACTIVITIES: CPT

## 2020-07-16 RX ORDER — SODIUM CHLORIDE 0.9 % (FLUSH) 0.9 %
SYRINGE (ML) INJECTION
Status: COMPLETED
Start: 2020-07-16 | End: 2020-07-16

## 2020-07-16 RX ORDER — 0.9 % SODIUM CHLORIDE 0.9 %
500 INTRAVENOUS SOLUTION INTRAVENOUS ONCE
Status: COMPLETED | OUTPATIENT
Start: 2020-07-16 | End: 2020-07-16

## 2020-07-16 RX ADMIN — LEVOTHYROXINE SODIUM 125 MCG: 125 TABLET ORAL at 08:22

## 2020-07-16 RX ADMIN — DOCUSATE SODIUM 100 MG: 100 CAPSULE, LIQUID FILLED ORAL at 08:23

## 2020-07-16 RX ADMIN — CLOPIDOGREL BISULFATE 75 MG: 75 TABLET ORAL at 08:23

## 2020-07-16 RX ADMIN — GUAIFENESIN 600 MG: 600 TABLET, EXTENDED RELEASE ORAL at 08:22

## 2020-07-16 RX ADMIN — Medication 2 PUFF: at 08:47

## 2020-07-16 RX ADMIN — LORAZEPAM 0.5 MG: 0.5 TABLET ORAL at 22:38

## 2020-07-16 RX ADMIN — IPRATROPIUM BROMIDE AND ALBUTEROL SULFATE 1 AMPULE: .5; 3 SOLUTION RESPIRATORY (INHALATION) at 20:28

## 2020-07-16 RX ADMIN — Medication 2 PUFF: at 20:28

## 2020-07-16 RX ADMIN — IPRATROPIUM BROMIDE AND ALBUTEROL SULFATE 1 AMPULE: .5; 3 SOLUTION RESPIRATORY (INHALATION) at 08:46

## 2020-07-16 RX ADMIN — INSULIN LISPRO 1 UNITS: 100 INJECTION, SOLUTION INTRAVENOUS; SUBCUTANEOUS at 21:02

## 2020-07-16 RX ADMIN — DIVALPROEX SODIUM 250 MG: 250 TABLET, DELAYED RELEASE ORAL at 21:32

## 2020-07-16 RX ADMIN — DOCUSATE SODIUM 100 MG: 100 CAPSULE, LIQUID FILLED ORAL at 21:32

## 2020-07-16 RX ADMIN — GUAIFENESIN 600 MG: 600 TABLET, EXTENDED RELEASE ORAL at 21:32

## 2020-07-16 RX ADMIN — Medication 10 ML: at 08:36

## 2020-07-16 RX ADMIN — PANTOPRAZOLE SODIUM 40 MG: 40 TABLET, DELAYED RELEASE ORAL at 08:22

## 2020-07-16 RX ADMIN — POTASSIUM CHLORIDE 10 MEQ: 750 TABLET, EXTENDED RELEASE ORAL at 08:22

## 2020-07-16 RX ADMIN — SODIUM CHLORIDE 500 ML: 9 INJECTION, SOLUTION INTRAVENOUS at 14:46

## 2020-07-16 RX ADMIN — QUETIAPINE FUMARATE 100 MG: 100 TABLET ORAL at 21:32

## 2020-07-16 RX ADMIN — ERYTHROMYCIN 500 MG: 250 TABLET, FILM COATED ORAL at 21:32

## 2020-07-16 RX ADMIN — HYDROCODONE BITARTRATE AND ACETAMINOPHEN 1 TABLET: 10; 325 TABLET ORAL at 15:26

## 2020-07-16 RX ADMIN — PREDNISONE 7.5 MG: 5 TABLET ORAL at 08:22

## 2020-07-16 RX ADMIN — DIVALPROEX SODIUM 250 MG: 250 TABLET, DELAYED RELEASE ORAL at 08:30

## 2020-07-16 ASSESSMENT — PAIN SCALES - GENERAL
PAINLEVEL_OUTOF10: 0
PAINLEVEL_OUTOF10: 8
PAINLEVEL_OUTOF10: 0
PAINLEVEL_OUTOF10: 6
PAINLEVEL_OUTOF10: 0
PAINLEVEL_OUTOF10: 0
PAINLEVEL_OUTOF10: 3

## 2020-07-16 NOTE — BH NOTE
Pt A+Ox4, pleasant, cooperative and medication compliant. Pt denies SI/HI/AVH and no RTIS noted. Pt has no other needs at this time.

## 2020-07-16 NOTE — PROGRESS NOTES
Progress Note    Admit Date:  7/9/2020    Subjective:  Ms. Leighann Arias denies any cough. Does report some dizziness with ambulation. Objective:   /66   Pulse 74   Temp 96.6 °F (35.9 °C) (Oral)   Resp 18   Ht 5' 2\" (1.575 m)   Wt 190 lb 4.8 oz (86.3 kg)   SpO2 99%   BMI 34.81 kg/m²          Intake/Output Summary (Last 24 hours) at 7/16/2020 1352  Last data filed at 7/16/2020 1221  Gross per 24 hour   Intake 1120 ml   Output --   Net 1120 ml       Physical Exam:  Gen: No distress. Alert. Elderly  female, sitting in common area eating lunch  Eyes:  No sclera icterus. No conjunctival injection. Neck:  Trachea midline. Resp: No accessory muscle use. No crackles. No wheezes. No rhonchi. supp O2  CV: Regular rate. Regular rhythm. No murmur. No rub. No edema. GI: Soft, Non-tender. Non-distended. Normal bowel sounds. Skin: Warm and dry. No rash on exposed extremities. M/S: No cyanosis. No clubbing. Ambulates with walker. Neuro: Awake. Grossly nonfocal, CN II-XII intact    Psych: Oriented x 3. Defer to psychiatry.     Scheduled Meds:   sodium chloride  500 mL Intravenous Once    QUEtiapine  100 mg Oral Nightly    insulin lispro  0-6 Units Subcutaneous TID WC    insulin lispro  0-3 Units Subcutaneous Nightly    ipratropium-albuterol  1 ampule Inhalation BID    guaiFENesin  600 mg Oral BID    clopidogrel  75 mg Oral Daily    divalproex  250 mg Oral BID    erythromycin base  500 mg Oral Nightly    pantoprazole  40 mg Oral QAM AC    docusate sodium  100 mg Oral BID    budesonide-formoterol  2 puff Inhalation BID    [Held by provider] furosemide  20 mg Oral Daily    levothyroxine  125 mcg Oral Daily    potassium chloride  10 mEq Oral Daily with breakfast    predniSONE  7.5 mg Oral Daily     PRN Meds:  glucose, dextrose, glucagon (rDNA), dextrose, trolamine salicylate, albuterol sulfate HFA, acetaminophen, haloperidol lactate **OR** haloperidol, traZODone, benztropine mesylate, magnesium hydroxide, aluminum & magnesium hydroxide-simethicone, HYDROcodone-acetaminophen, LORazepam **OR** [DISCONTINUED] LORazepam    CULTURES  None     RADIOLOGY    XR CHEST PORTABLE 7/9/2020   Final Result   No acute cardiopulmonary disease. CT Head WO Contrast 7/9/2020   Final Result   No acute intracranial abnormality.            Assessment/Plan:    Hallucinations  Depression  - cont mgmt per [facility]    Orthostatic Hypotension  Mild ANA  - Cr 1.1  - hold Lasix, stop Metformin, s/p 2x 250 cc NS bolus  - repeat BMP - Cr 1, continues to c/o lightheadedness w/ ambulation  - will give 1 additional 500 cc bolus   - continue to monitor     Dysuria - Resolved  - checked UA with reflex to cx - neg  - no signs of infection    Cough - improved  COPD - no AE  Chronic Hypoxic Respiratory Failure  Chronic Steroid Use  - on 3L NC, stable, no c/o SOB  - cont Symbicort, cont prednisone, PRN albuterol  - add Mucinex and Duonebs; pt is afebrile     DM Type II  - well controlled  - hold Metformin 2/2 above, add low dose SSI  - daily POC glucose     GERD  - cont Protonix     Chronic Ischemic Heart Disease  - last echo from 2016 with normal EF  - appears compensated  - low Na diet, daily weights  - on Lasix w/ K supplements, hold Lasix for now, cautions w/ IVF     SSS  - s/p pacemaker     Hypothyroidism  - home dose of synthroid 125 mcg       Chronic Pain with Opioids Dependence  - cont PRN Norco - add PRN parameters     Previously enrolled in hospice    Jeannie Avila PA-C 1:52 PM 7/16/2020

## 2020-07-16 NOTE — BH NOTE
Lula AOx4, pleasant and bright this morning. She denies SIHI and AVH, no reference to \"figures\" or \"little boy\" during interview. Compliant with medication administration and care. Provided 20 min of education about COPD, pt grateful for information and handout. One incidence of starring and unsteadiness with OT, but none while ambulating to common area.

## 2020-07-16 NOTE — BH NOTE
Pt requested pain medication for chronic back pain 8/10. Norco given @2139 per PRN order (see MAR). Will continue to monitor pain.

## 2020-07-16 NOTE — FLOWSHEET NOTE
07/16/20 1241   Encounter Summary   Services provided to: Patient   Continue Visiting   (7/16 Pt attended [facility]   Complexity of Encounter High   Length of Encounter 1 hour   Spiritual Assessment Completed Yes   Spiritual/Congregational   Type Spiritual support   Assessment Calm; Approachable;Doubtful   Intervention Active listening;Explored feelings, thoughts, concerns;Explored coping resources;Nurtured hope;Prayer;Scripture;Provided reading materials/devotional materials;Sustaining presence/ Ministry of presence;Empowerment; Discussed meaning/purpose;Discussed relationship with God;Discussed belief system/Muslim practices/loulou;Discussed illness/injury and it's impact   Outcome Connection/belonging;Comfort;Expressed gratitude;Expressed feelings of josh, peace, and/or awe;Engaged in conversation; Shared life review;Expressed feelings/needs/concerns;Encouraged;Receptive; Hopeful

## 2020-07-16 NOTE — PROGRESS NOTES
Inpatient Occupational Therapy Treatment Note    Unit:  [facility]  Date:  7/16/2020  Patient Name:    Sharonda Watson  Admitting diagnosis:  MDD (major depressive disorder), single episode [F32.9]  Admit Date:  7/9/2020  Precautions/Restrictions/WB Status/ Lines/ Wounds/ Oxygen:  Standard BHI Precautions  Fall Risk  orthostatic hypotension  History of Present Illness:  PMH of chronic ischemic heart disease, CAD, COPD, chronic hypoxic respiratory failure on 3L NC, DM Type II who presented to [facility] for worsening depression and hallucinations. Treatment Number:  3    Treatment Time: 128-350  Timed Code Treatment Minutes:  38   minutes   Total Treatment Time: 38 minutes    Staff Recommendations:    Assist of 1 to ambulate with RW-make sure pt is asymptomatic prior to ambulation. Discharge Recommendations:SNF    DME needs for discharge: Facility to decide    AM-PAC Score: AM-PAC Inpatient Daily Activity Raw Score: 18  Home Health S4 Level:  NA      MOCA:  To be assessed    Subjective:  Pt was found seated on EOB today finishing breakfast.  Was receptive to OT tx. Reported feeling better today, but still having episodes of feeling lightheaded. Pt also reported feeling overall fatigue. ADLs:  Self Care: Toileting:  CGA  Grooming: Supervision to wash hands while leaning onto sink   Dressing: CGA to pull pants up/down safely.   Donned B socks with setup while seated EOB    Pain   No  Rating:NA  Location: NA  Pain Medicine Status: No request made      Cognition    A&O to Person, Place, Time and Situation   Able to follow:  2 step commands    Balance: Fair minus dynamic standing    Bed mobility:  Independent on flat bed    Transfer Training:   Sit to stand:   CGA from bed with RW-complained of minimal light headedness initially  Stand to sit:   Min A/CGA   Bed to Chair:  Not tested  Standard toilet:   Min A today to sit onto commode    Activity Tolerance   Pt completed therapy session with slight Dizziness noted with

## 2020-07-16 NOTE — PROGRESS NOTES
Department of Psychiatry  Attending Progress Note    Admission Date:    7/9/2020    Chief complaint / Reason for Admission:  Altered mental status and suicidal ideation    Patient's chart was reviewed, case was discussed with nursing/OT/RT staff, and collaborated with  about the treatment plan. SUBJECTIVE:   Over last 24 hours:  Behavioral outbursts: No   Non-aggressive behavioral disturbance: No  Medication compliant: Yes  Need for seclusion/restraints: No  Sleeping adequately:  Improved  Appetite adequate: Yes  Attending groups: Yes    Pt continues to have medical issues pop up. Psychiatrically and cognitively she is doing better. Again was lucid and coherent this afternoon, and denies SI. However, developed and ANA and again required IVF. She also has been noted to be more unsteady with ambulation per OT, and they have amended their recommendation from home with home health to SNF. Needs PT eval.    Progressing overall: Psychiatrically improved, but medically tenuous.   Suicidal ideation: Denies  Homicidal ideation: Denies  Medication side effects: No    ROS: Patient has new complaints: No    Current Medications Ordered:   sodium chloride  500 mL Intravenous Once    QUEtiapine  100 mg Oral Nightly    insulin lispro  0-6 Units Subcutaneous TID WC    insulin lispro  0-3 Units Subcutaneous Nightly    ipratropium-albuterol  1 ampule Inhalation BID    guaiFENesin  600 mg Oral BID    clopidogrel  75 mg Oral Daily    divalproex  250 mg Oral BID    erythromycin base  500 mg Oral Nightly    pantoprazole  40 mg Oral QAM AC    docusate sodium  100 mg Oral BID    budesonide-formoterol  2 puff Inhalation BID    [Held by provider] furosemide  20 mg Oral Daily    levothyroxine  125 mcg Oral Daily    potassium chloride  10 mEq Oral Daily with breakfast    predniSONE  7.5 mg Oral Daily      PRN Meds: glucose, dextrose, glucagon (rDNA), dextrose, trolamine salicylate, albuterol sulfate HFA, of breath    History of coronary artery disease    MDD (major depressive disorder), single episode    Chronic ischemic heart disease    Pacemaker    Chronic respiratory failure with hypoxia (HCC)     All conditions detailed above are being treated while patient is hospitalized.      Tx plan: Generally: prevent self injury/aggression, stabilize mood/anxiety/psychotic/behavioral disturbance, establish/maintain aftercare, increase coping mechanisms, improve medication compliance.  All conditions present on admission are being treated while pt is hospitalized.      Legal Status: Voluntary     Primary Psychiatric Issues:  1. Delirium; Bipolar depression:  -D/C'd adderall. No indication and risk of contributing to mood instability and cycling. -D/C'd donepezil. Patient does not have a formal AD diagnosis, and AChIs are contraindicated in severe COPD. -Continue divalproex 250 mg BID. Check level tomorrow AM.  -Increased quetiapine appears to have led to orthostatic hypotension.   -Reduced dose back to 100 mg qhs.     Chemical Dependency Issues:  Patient no longer smoker.     Function:  -Consult physical therapy  -Consulted occupational therapy - no recommending SNF.  -Falls precautions     Medical Problems:  Internal medicine has been consulted. Appreciate recs.     Code Status: DNR     Disposition:    -Housing: Own home. SNF now recommended.  -Current outpatient follow-up: [facility]     Estimated length of stay: 3 to 5 days     Criteria for Discharge:  Not psychotic, not homicidal, not suicidal, behavioral disturbance under control, sleeping well, mood improved/stable, eating well, aftercare arranged. Total face to face time with patient was 35 minutes and more than 50 % of that time was spent counseling the patient on their symptoms, treatment and expected goals.     Lizbeth Barney MD  Staff Psychiatrist

## 2020-07-17 LAB
GLUCOSE BLD-MCNC: 114 MG/DL (ref 70–99)
GLUCOSE BLD-MCNC: 161 MG/DL (ref 70–99)
GLUCOSE BLD-MCNC: 211 MG/DL (ref 70–99)
GLUCOSE BLD-MCNC: 476 MG/DL (ref 70–99)
PERFORMED ON: ABNORMAL
VALPROIC ACID LEVEL: 43.7 UG/ML (ref 50–100)

## 2020-07-17 PROCEDURE — 6370000000 HC RX 637 (ALT 250 FOR IP): Performed by: PHYSICIAN ASSISTANT

## 2020-07-17 PROCEDURE — 6370000000 HC RX 637 (ALT 250 FOR IP): Performed by: PSYCHIATRY & NEUROLOGY

## 2020-07-17 PROCEDURE — 97116 GAIT TRAINING THERAPY: CPT

## 2020-07-17 PROCEDURE — 2580000003 HC RX 258

## 2020-07-17 PROCEDURE — 80164 ASSAY DIPROPYLACETIC ACD TOT: CPT

## 2020-07-17 PROCEDURE — 94761 N-INVAS EAR/PLS OXIMETRY MLT: CPT

## 2020-07-17 PROCEDURE — 36415 COLL VENOUS BLD VENIPUNCTURE: CPT

## 2020-07-17 PROCEDURE — 94640 AIRWAY INHALATION TREATMENT: CPT

## 2020-07-17 PROCEDURE — 99232 SBSQ HOSP IP/OBS MODERATE 35: CPT | Performed by: PHYSICIAN ASSISTANT

## 2020-07-17 PROCEDURE — 97530 THERAPEUTIC ACTIVITIES: CPT

## 2020-07-17 PROCEDURE — 2700000000 HC OXYGEN THERAPY PER DAY

## 2020-07-17 PROCEDURE — 1240000000 HC EMOTIONAL WELLNESS R&B

## 2020-07-17 PROCEDURE — 99233 SBSQ HOSP IP/OBS HIGH 50: CPT | Performed by: PSYCHIATRY & NEUROLOGY

## 2020-07-17 PROCEDURE — 97162 PT EVAL MOD COMPLEX 30 MIN: CPT

## 2020-07-17 RX ORDER — SODIUM CHLORIDE 0.9 % (FLUSH) 0.9 %
SYRINGE (ML) INJECTION
Status: COMPLETED
Start: 2020-07-17 | End: 2020-07-17

## 2020-07-17 RX ORDER — TRAZODONE HYDROCHLORIDE 50 MG/1
50 TABLET ORAL NIGHTLY PRN
Status: DISCONTINUED | OUTPATIENT
Start: 2020-07-17 | End: 2020-07-21

## 2020-07-17 RX ORDER — FUROSEMIDE 20 MG/1
20 TABLET ORAL DAILY PRN
Status: DISCONTINUED | OUTPATIENT
Start: 2020-07-17 | End: 2020-07-22 | Stop reason: HOSPADM

## 2020-07-17 RX ADMIN — PREDNISONE 7.5 MG: 5 TABLET ORAL at 08:39

## 2020-07-17 RX ADMIN — DIVALPROEX SODIUM 250 MG: 250 TABLET, DELAYED RELEASE ORAL at 21:16

## 2020-07-17 RX ADMIN — Medication 2 PUFF: at 11:45

## 2020-07-17 RX ADMIN — DOCUSATE SODIUM 100 MG: 100 CAPSULE, LIQUID FILLED ORAL at 08:38

## 2020-07-17 RX ADMIN — LORAZEPAM 0.5 MG: 0.5 TABLET ORAL at 21:23

## 2020-07-17 RX ADMIN — POTASSIUM CHLORIDE 10 MEQ: 750 TABLET, EXTENDED RELEASE ORAL at 08:39

## 2020-07-17 RX ADMIN — LEVOTHYROXINE SODIUM 125 MCG: 125 TABLET ORAL at 07:22

## 2020-07-17 RX ADMIN — IPRATROPIUM BROMIDE AND ALBUTEROL SULFATE 1 AMPULE: .5; 3 SOLUTION RESPIRATORY (INHALATION) at 20:15

## 2020-07-17 RX ADMIN — Medication 2 PUFF: at 20:15

## 2020-07-17 RX ADMIN — GUAIFENESIN 600 MG: 600 TABLET, EXTENDED RELEASE ORAL at 08:38

## 2020-07-17 RX ADMIN — INSULIN LISPRO 3 UNITS: 100 INJECTION, SOLUTION INTRAVENOUS; SUBCUTANEOUS at 21:14

## 2020-07-17 RX ADMIN — DOCUSATE SODIUM 100 MG: 100 CAPSULE, LIQUID FILLED ORAL at 21:15

## 2020-07-17 RX ADMIN — Medication 10 ML: at 21:13

## 2020-07-17 RX ADMIN — HYDROCODONE BITARTRATE AND ACETAMINOPHEN 1 TABLET: 10; 325 TABLET ORAL at 21:22

## 2020-07-17 RX ADMIN — GUAIFENESIN 600 MG: 600 TABLET, EXTENDED RELEASE ORAL at 21:15

## 2020-07-17 RX ADMIN — CLOPIDOGREL BISULFATE 75 MG: 75 TABLET ORAL at 08:39

## 2020-07-17 RX ADMIN — ERYTHROMYCIN 500 MG: 250 TABLET, FILM COATED ORAL at 21:16

## 2020-07-17 RX ADMIN — DIVALPROEX SODIUM 250 MG: 250 TABLET, DELAYED RELEASE ORAL at 08:38

## 2020-07-17 RX ADMIN — PANTOPRAZOLE SODIUM 40 MG: 40 TABLET, DELAYED RELEASE ORAL at 07:22

## 2020-07-17 RX ADMIN — IPRATROPIUM BROMIDE AND ALBUTEROL SULFATE 1 AMPULE: .5; 3 SOLUTION RESPIRATORY (INHALATION) at 11:45

## 2020-07-17 ASSESSMENT — PAIN SCALES - GENERAL
PAINLEVEL_OUTOF10: 2
PAINLEVEL_OUTOF10: 0
PAINLEVEL_OUTOF10: 8

## 2020-07-17 NOTE — BH NOTE
Saurav Olivalinn was invited and encouraged to attend [address]. Pt declined invite, stating \"I don't feel like it. \" Pt did not attend group and declined individual activities offered.     Danilo Kurtz, MT-BC

## 2020-07-17 NOTE — PLAN OF CARE
Erwin Navarrete has been visible on the unit. Social with select peers. Mood mostly bright but does become anxious at times. Requested PRN Ativan before bed. Talked in depth about back issues and her addiction to opiates. States doctor took her off of them completely before coming to the hospital and that she started to go into \"withdrawal.\" States now she is being weaned off of them and where she was used to taking four 10 mg Vicodin per day, she is now down to two. She states she is worried about where she will go from here since she is no longer being accepted back at hospice facility. Offered pt support and reassurance that SW is involved and she will have somewhere to go. Pt in bed asleep at present. Will continue to monitor.

## 2020-07-17 NOTE — GROUP NOTE
Group Therapy Note    Date: 7/17/2020    Group Start Time: 1300  Group End Time: 1400  Group Topic: Psychoeducation    DAMIAN [address], [facility]    Group Therapy Note    Attendees: 5    Patients learned about the importance of focusing on values and discussed setting SMART goals. Patients were given psycho education handouts and worked on identifying their values and setting immediate, short term, medium term, and long term goals for their lives. Patients were encouraged to share their values and goals during the group discussion. Notes:  Pt was engaged in group discussion and reported wanting to set goals around her health to lose weight and be able to go home. Status After Intervention:  Improved    Participation Level:  Active Listener and Interactive    Participation Quality: Appropriate, Attentive and Sharing      Speech:  normal      Thought Process/Content: Logical  Linear      Affective Functioning: Flat and Constricted/Restricted      Mood: anxious and depressed      Level of consciousness:  Alert and Oriented x4      Response to Learning: Able to verbalize current knowledge/experience, Able to verbalize/acknowledge new learning and Able to retain information      Endings: None Reported    Modes of Intervention: Education, Support, Socialization, Exploration, Clarifying, Problem-solving and Activity      Discipline Responsible: /Counselor      Signature:  JOHN Uribe-S, R-MATHEUST

## 2020-07-17 NOTE — PROGRESS NOTES
Department of Psychiatry  Attending Progress Note    Admission Date:    7/9/2020    Chief complaint / Reason for Admission:  Altered mental status and suicidal ideation    Patient's chart was reviewed, case was discussed with nursing/OT/RT staff, and collaborated with  about the treatment plan. SUBJECTIVE:   Over last 24 hours:  Behavioral outbursts: No   Non-aggressive behavioral disturbance: No  Medication compliant: Yes  Need for seclusion/restraints: No  Sleeping adequately:  Improved  Appetite adequate: Yes  Attending groups: Yes    Pt continues to experience dizziness particularly when standing in the bathroom to perform ADLs. She is not, however, having as much confusion. Remains unsteady and weak. PT assessment pending. Progressing overall: Psychiatrically improved, but medically tenuous.   Suicidal ideation: Denies  Homicidal ideation: Denies  Medication side effects: No    ROS: Patient has new complaints: No    Current Medications Ordered:   insulin lispro  0-6 Units Subcutaneous TID WC    insulin lispro  0-3 Units Subcutaneous Nightly    ipratropium-albuterol  1 ampule Inhalation BID    guaiFENesin  600 mg Oral BID    clopidogrel  75 mg Oral Daily    divalproex  250 mg Oral BID    erythromycin base  500 mg Oral Nightly    pantoprazole  40 mg Oral QAM AC    docusate sodium  100 mg Oral BID    budesonide-formoterol  2 puff Inhalation BID    levothyroxine  125 mcg Oral Daily    potassium chloride  10 mEq Oral Daily with breakfast    predniSONE  7.5 mg Oral Daily      PRN Meds: traZODone, furosemide, glucose, dextrose, glucagon (rDNA), dextrose, trolamine salicylate, albuterol sulfate HFA, acetaminophen, haloperidol lactate **OR** haloperidol, benztropine mesylate, magnesium hydroxide, aluminum & magnesium hydroxide-simethicone, HYDROcodone-acetaminophen, LORazepam **OR** [DISCONTINUED] LORazepam     Objective:     PE:    /62   Pulse 76   Temp 97 °F (36.1 °C) (Oral) Resp 16   Ht 5' 2\" (1.575 m)   Wt 189 lb 9.6 oz (86 kg)   SpO2 98%   BMI 34.68 kg/m²       Motor / Gait: No significant PMR/PMA, nml tone, no involuntary movements, gait aided by walker     Mental Status Examination:    Appearance: WF, appears stated age, wearing hospital gown, improved grooming and hygiene  Behavior/Attitude toward examiner:  Cooperative, improved attention, improvedEC  Speech:  Normal volume, amount, not halting, +word finding difficulty  Mood:  \"So-so\"  Affect:  Constricted  Thought processes:  Linear  Thought Content: Denies SI, denies HI, no delusions voiced, no obsessions, no confabulation today  Perceptions: Denies active AVH, not actively  RTIS  Attention: Improved  Abstraction: Kevin  Cognition: Average AYDE, Alert and oriented to person, place, time, and situation, recall improved  Insight: Improved insight   Judgment: Improved judgment      LAB: Reviewed labs from last 24 hours      Assessment and Plan:     Diagnoses:   Primary Psychiatric (DSM V) Diagnosis: Delirium due to polypharmacy, mixed level of activity, resolving  Secondary Psychiatric (DSM V) Diagnoses: Bipolar disorder, currently depressed, moderate  Chemical Dependency Diagnoses: Tobacco use disorder, in remission  Active Medical Diagnoses:       Patient Active Problem List   Diagnosis    Pneumonia    Chronic obstructive pulmonary disease ([address]    Type II diabetes mellitus, well controlled ([address]    CAD (coronary artery disease)    Acute-on-chronic respiratory failure ([address]    Sepsis ([address]    Pleural effusion    Hyperkalemia    CAP (community acquired pneumonia)    Chest pain    Chest pressure    Shortness of breath    History of coronary artery disease    MDD (major depressive disorder), single episode    Chronic ischemic heart disease    Pacemaker    Chronic respiratory failure with hypoxia ([address]     All conditions detailed above are being treated while patient is hospitalized.      Tx plan: Generally: prevent self injury/aggression, stabilize mood/anxiety/psychotic/behavioral disturbance, establish/maintain aftercare, increase coping mechanisms, improve medication compliance.  All conditions present on admission are being treated while pt is hospitalized.      Legal Status: Voluntary     Primary Psychiatric Issues:  1. Delirium; Bipolar depression:  -D/C'd adderall. No indication and risk of contributing to mood instability and cycling. -D/C'd donepezil. Patient does not have a formal AD diagnosis, and AChIs are contraindicated in severe COPD. -Continue divalproex 250 mg BID. Level 43.7  -D/C quetiapine given persistent dizziness.     Chemical Dependency Issues:  Patient no longer smoker.     Function:  -Consult physical therapy  -Consulted occupational therapy - no recommending SNF.  -Falls precautions     Medical Problems:  Internal medicine has been consulted. Appreciate recs.     Code Status: DNR     Disposition:    -Housing: Own home. SNF now recommended.  -Current outpatient follow-up: [facility]     Estimated length of stay: 3 to 5 days     Criteria for Discharge:  Not psychotic, not homicidal, not suicidal, behavioral disturbance under control, sleeping well, mood improved/stable, eating well, aftercare arranged. Total face to face time with patient was 35 minutes and more than 50 % of that time was spent counseling the patient on their symptoms, treatment and expected goals.     Renetta Rodriguez MD  Staff Psychiatrist

## 2020-07-17 NOTE — PROGRESS NOTES
Inpatient [facility] Physical Therapy Evaluation and Treatment    Unit:  [facility]  Date:  7/17/2020  Patient Name:    Burt Cardenas  Admitting diagnosis:  MDD (major depressive disorder), single episode [F32.9]  Admit Date:  7/9/2020  Precautions/Restrictions/Medical Indications    Standard BHI Precautions  Fall Risk   Check orthostatics  History of current Episode: (per Dr. Yazmin Lagos)  Patient is a 70 y.o. female with history of bipolar disorder admitted for suicidal ideation. Patient's history is convoluted. She was initially brought in by [facility] for [address] [place] [address] called by her home hospice nurse. Per ED documentation, patient was experiencing VH and bizarre behaviors, such as chewing on her O2 tubing and medication bottles, and seeing \"a little boy she was calling Yobani. \"  At some point she made a suicidal statement, something along the lines of \"I can't go on anymore,\" which then prompted her psychiatric assessment. ED evaluation didn't reveal any acute processes, though she did not have an ABG performed despite having end stage COPD with baseline 3L O2 requirement.     This morning, patient appears indeed to be altered. She is alert and oriented to self, \"hospital,\" though not specific hospital, day, month, and year, but not date. However, she was highly inattentive, and struggled to complete her thoughts, frequently trailing off of the end of sentences, or completing sentences with completely unrelated thoughts. She required questions be asked repeatedly, and responded frequently by saying \"I don't know. \"  Patient did confirm that she sometimes wishes she would die, and went on to reference a combination of factors including her severe medical conditions, the fact that she fears a slow, painful death and would like to \"get it over with,\" as well as social isolation, and an estranged relationship with her granddaughter that has prevented her from seeing her great grandchildren for the past year.   She denies, however, active suicidal ideation. Patient struggled significant to detail her past history. She couldn't name any of her medications, and couldn't ID them even when prompted with their names. The only concern she was able to articulate coherently was that she has been struggling to sleep at night.     I spoke with a CM at her hospice. Patient has actually been in hospice for 2.5 years. She has had major ups and downs in her medical stability. On several occasions they have thought her capable of discharge from services only for her to decline precipitously. CM notes that patient has been increasingly disoriented, forgetful, and odd over the past month. The only medication change that has occurred just before this was a decrease in her norco from q4h prn to q6h prn because patient's pill counts didn't match up and there was concern for diversion by her niece. Patient has recently been started on prophylactic erythromycin as well due to concerns that a persistent UTI might be causative. Did not have any head imaging prior to CT performed yesterday. Hospice has significant concerns about how patient is taking medications. She frequently seems to take medication selectively, and regularly her pill counts are off. She will margo certain medications and they suspect take them all at once. It has been suggested that she participate in a respite stay in the past to straighten out her medications, but she has consistently refused.     PMH of chronic ischemic heart disease, CAD, COPD, chronic hypoxic respiratory failure on 3L NC, DM Type II who presented to [facility] for worsening   Treatment Time:  17:05-18:10  Treatment Number:  1         Discharge Recommendations from PHYSICAL perspective:                                          SNF    DME needs for discharge:     defer to facility     Therapy recommendations for staff:   Assist of 2 (minimal assist) with use of rolling walker (RW) and gait belt for all transfers and Treatment Time:   60 minutes    Mary Ann Elizabeth, PT #[id number]      If patient discharges from this facility prior to next visit, this note will serve as the Discharge Summary.

## 2020-07-17 NOTE — PLAN OF CARE
Problem: Falls - Risk of:  Goal: Will remain free from falls  Description: Will remain free from falls  Outcome: Ongoing  Goal: Absence of physical injury  Description: Absence of physical injury  Outcome: Ongoing     Problem: Depressive Behavior With or Without Suicide Precautions:  Goal: Able to verbalize acceptance of life and situations over which he or she has no control  Description: Able to verbalize acceptance of life and situations over which he or she has no control  Outcome: Ongoing  Goal: Able to verbalize and/or display a decrease in depressive symptoms  Description: Able to verbalize and/or display a decrease in depressive symptoms  Outcome: Ongoing    Pt has been pleasant, calm and cooperative. Visible on the unit. Social w/ peers. Pt experienced another syncopal-like episode while standing at her sink, brushing her teeth. Pt reported her head suddenly began hurting, she felt light headed, and her legs began tingling. Pt was assisted to a w/c. VSS at that time. 120/66, 86, 100% w/ O2 @ 3 L via NC. No orthostatic hypotension noted. Denies SI/HI/AVH. Will continue to monitor.

## 2020-07-17 NOTE — BH NOTE
[facility] Interdisciplinary Treatment Plan Note     Review Date & Time: 7/17/20 1115    Patient was not in treatment team.    Admission Type:   Admission Type:  Involuntary    Reason for admission:  Reason for Admission: Depression, SI    Estimated Length of Stay Update:  7-10 days   Estimated Discharge Date Update: 7/20-7/23/20    PATIENT STRENGTHS:  Patient Strengths:Strengths: Connection to output provider(Hospice. )  Patient Strengths and Limitations:Limitations: Perceives need for assistance with self-care, Tendency to isolate self, Difficult relationships / poor social skills, General negative or hopeless attitude about future/recovery, Demonstrates discomfort with /lack of social skills, Hopeless about future, Difficulty problem solving/relies on others to help solve problems, Limited education -> difficulty reading or writing  Addictive Behavior:Addictive Behavior  In the past 3 months, have you felt or has someone told you that you have a problem with:  : None  Do you have a history of Chemical Use?: Comment(Pt stated she used substances many many years ago but cannot remember what.)  Do you have a history of Alcohol Use?: Comment(Pt stated she drank alochol many many years ago. )  Do you have a history of Street Drug Abuse?: Comment(Pt stated she used substances many many years ago but cannot remember what.)  Histroy of Prescripton Drug Abuse?: Comment(Pt's hospice CM has concerns that the pt is abusing her narcotic medication; as her medication counts are often off.)  Medical Problems:   Past Medical History:   Diagnosis Date    Arthritis     lower back, shoulder blades    Bipolar depression ([address]     CAD (coronary artery disease)     CHF (congestive heart failure) ([address]     COPD (chronic obstructive pulmonary disease) ([address]     Depression     Diabetes mellitus ([address]     Hyperlipidemia     Pneumonia     Thyroid disease     hypothyroidism    Unspecified cerebral artery occlusion with cerebral infarction     2006       Risk:  Fall RiskTotal: 106  Angel Scale Angel Scale Score: 20  BVC Total: 0  Change in scores NA. Tonye Cogan Changes to plan of Care NA. Status EXAM:   Status and Exam  Normal: No  Facial Expression: Brightened, Worried  Affect: Congruent  Level of Consciousness: Alert  Mood:Normal: No  Mood: Anxious  Motor Activity:Normal: Yes  Motor Activity: Decreased  Interview Behavior: Cooperative  Preception: [place] to Person, [place] Mage to Time, [place] to Place, [place] to Situation  Attention:Normal: No  Attention: Distractible  Thought Processes: Circumstantial  Thought Content:Normal: Yes  Thought Content: Preoccupations  Hallucinations: None  Delusions: No  Memory:Normal: No  Memory: Poor Recent, Poor Remote  Insight and Judgment: No  Insight and Judgment: Poor Judgment, Poor Insight  Present Suicidal Ideation: No  Present Homicidal Ideation: No    Daily Assessment Last Entry:   Daily Sleep (WDL): Within Defined Limits         Patient Currently in Pain: Denies  Daily Nutrition (WDL): Within Defined Limits    Patient Monitoring:  Frequency of Checks: 4 times per hour, close    Psychiatric Symptoms:   Depression Symptoms  Depression Symptoms: Impaired concentration  Anxiety Symptoms  Anxiety Symptoms: Generalized  Irina Symptoms  Irina Symptoms: No problems reported or observed. Psychosis Symptoms  Delusion Type: No problems reported or observed. Suicide Risk CSSR-S:  1) Within the past month, have you wished you were dead or wished you could go to sleep and not wake up? : Yes  2) Have you actually had any thoughts of killing yourself? : No  6) Have you ever done anything, started to do anything, or prepared to do anything to end your life?: No  Change in Result NA.   Change in Plan of care NA.     EDUCATION:   Learner Progress Toward Treatment Goals: Reviewed results and recommendations of this team    Method: Individual    Outcome: Verbalized understanding    PATIENT GOALS: To get a shower and wash my clothes    PLAN/TREATMENT RECOMMENDATIONS UPDATE: continue treatment    GOALS UPDATE:  Time frame for Short-Term Goals: 1 day      Cici Farah RN

## 2020-07-17 NOTE — PROGRESS NOTES
Progress Note    Admit Date:  7/9/2020    Subjective:  Ms. Oleksandr Albert feels improved. Denies cough or dizziness. No SOB or chest pain. Objective:   /62   Pulse 76   Temp 97 °F (36.1 °C) (Oral)   Resp 16   Ht 5' 2\" (1.575 m)   Wt 189 lb 9.6 oz (86 kg)   SpO2 98%   BMI 34.68 kg/m²          Intake/Output Summary (Last 24 hours) at 7/17/2020 1417  Last data filed at 7/16/2020 1723  Gross per 24 hour   Intake 240 ml   Output --   Net 240 ml       Physical Exam:  Gen: No distress. Alert. Elderly  female, resting in bed  Eyes:  No sclera icterus. No conjunctival injection. Neck:  Trachea midline. Resp: No accessory muscle use. No crackles. No wheezes. No rhonchi. supp O2  CV: Regular rate. Regular rhythm. No murmur. No rub. No edema. GI: Soft, Non-tender. Non-distended. Normal bowel sounds. Skin: Warm and dry. No rash on exposed extremities. M/S: No cyanosis. No clubbing. Ambulates with walker. Neuro: Awake. Grossly nonfocal, CN II-XII intact    Psych: Oriented x 3. Defer to psychiatry.     Scheduled Meds:   insulin lispro  0-6 Units Subcutaneous TID WC    insulin lispro  0-3 Units Subcutaneous Nightly    ipratropium-albuterol  1 ampule Inhalation BID    guaiFENesin  600 mg Oral BID    clopidogrel  75 mg Oral Daily    divalproex  250 mg Oral BID    erythromycin base  500 mg Oral Nightly    pantoprazole  40 mg Oral QAM AC    docusate sodium  100 mg Oral BID    budesonide-formoterol  2 puff Inhalation BID    [Held by provider] furosemide  20 mg Oral Daily    levothyroxine  125 mcg Oral Daily    potassium chloride  10 mEq Oral Daily with breakfast    predniSONE  7.5 mg Oral Daily     PRN Meds:  traZODone, glucose, dextrose, glucagon (rDNA), dextrose, trolamine salicylate, albuterol sulfate HFA, acetaminophen, haloperidol lactate **OR** haloperidol, benztropine mesylate, magnesium hydroxide, aluminum & magnesium hydroxide-simethicone, HYDROcodone-acetaminophen, LORazepam **OR** [DISCONTINUED] LORazepam    CULTURES  None     RADIOLOGY    XR CHEST PORTABLE 7/9/2020   Final Result   No acute cardiopulmonary disease. CT Head WO Contrast 7/9/2020   Final Result   No acute intracranial abnormality.            Assessment/Plan:    Hallucinations  Depression  - cont mgmt per [facility]    Orthostatic Hypotension - Resolved  Mild ANA - improved  - Cr 1.1  - held Lasix, stop Metformin, s/p 2x 250 cc NS bolus  - repeat BMP - Cr 1, continues to c/o lightheadedness w/ ambulation  - s/p500 cc bolus   - repeat BMP tomorrow, change lasix to PRN, if Cr normal, may resume Metfromin     Dysuria - Resolved  - checked UA with reflex to cx - neg  - no signs of infection    Cough - improved  COPD - no AE  Chronic Hypoxic Respiratory Failure  Chronic Steroid Use  - on 3L NC, stable, no c/o SOB  - cont Symbicort, cont prednisone, PRN albuterol  - add Mucinex and Duonebs; pt is afebrile     DM Type II  - well controlled  - hold Metformin 2/2 above, add low dose SSI  - daily POC glucose     GERD  - cont Protonix     Chronic Ischemic Heart Disease  - last echo from 2016 with normal EF  - appears compensated  - low Na diet, daily weights  - on Lasix w/ K supplements, held Lasix 2/2 positive orthostatics and ANA  - improved, rudd to PRN Lasix     SSS  - s/p pacemaker     Hypothyroidism  - home dose of synthroid 125 mcg       Chronic Pain with Opioids Dependence  - cont PRN Norco - add PRN parameters     Previously enrolled in hospice    Emily Mccollum PA-C 2:17 PM 7/17/2020

## 2020-07-18 LAB
ANION GAP SERPL CALCULATED.3IONS-SCNC: 10 MMOL/L (ref 3–16)
BUN BLDV-MCNC: 23 MG/DL (ref 7–20)
CALCIUM SERPL-MCNC: 9 MG/DL (ref 8.3–10.6)
CHLORIDE BLD-SCNC: 97 MMOL/L (ref 99–110)
CO2: 32 MMOL/L (ref 21–32)
CREAT SERPL-MCNC: 1 MG/DL (ref 0.6–1.2)
GFR AFRICAN AMERICAN: >60
GFR NON-AFRICAN AMERICAN: 54
GLUCOSE BLD-MCNC: 114 MG/DL (ref 70–99)
GLUCOSE BLD-MCNC: 123 MG/DL (ref 70–99)
GLUCOSE BLD-MCNC: 133 MG/DL (ref 70–99)
GLUCOSE BLD-MCNC: 135 MG/DL (ref 70–99)
GLUCOSE BLD-MCNC: 177 MG/DL (ref 70–99)
PERFORMED ON: ABNORMAL
POTASSIUM SERPL-SCNC: 4.8 MMOL/L (ref 3.5–5.1)
SODIUM BLD-SCNC: 139 MMOL/L (ref 136–145)

## 2020-07-18 PROCEDURE — 2580000003 HC RX 258

## 2020-07-18 PROCEDURE — 2700000000 HC OXYGEN THERAPY PER DAY

## 2020-07-18 PROCEDURE — 80048 BASIC METABOLIC PNL TOTAL CA: CPT

## 2020-07-18 PROCEDURE — 6370000000 HC RX 637 (ALT 250 FOR IP): Performed by: PSYCHIATRY & NEUROLOGY

## 2020-07-18 PROCEDURE — 94640 AIRWAY INHALATION TREATMENT: CPT

## 2020-07-18 PROCEDURE — 6370000000 HC RX 637 (ALT 250 FOR IP): Performed by: PHYSICIAN ASSISTANT

## 2020-07-18 PROCEDURE — 1240000000 HC EMOTIONAL WELLNESS R&B

## 2020-07-18 PROCEDURE — 94761 N-INVAS EAR/PLS OXIMETRY MLT: CPT

## 2020-07-18 PROCEDURE — 36415 COLL VENOUS BLD VENIPUNCTURE: CPT

## 2020-07-18 RX ORDER — SODIUM CHLORIDE 0.9 % (FLUSH) 0.9 %
SYRINGE (ML) INJECTION
Status: COMPLETED
Start: 2020-07-18 | End: 2020-07-18

## 2020-07-18 RX ADMIN — Medication 10 ML: at 20:52

## 2020-07-18 RX ADMIN — POTASSIUM CHLORIDE 10 MEQ: 750 TABLET, EXTENDED RELEASE ORAL at 08:19

## 2020-07-18 RX ADMIN — Medication 2 PUFF: at 07:37

## 2020-07-18 RX ADMIN — DOCUSATE SODIUM 100 MG: 100 CAPSULE, LIQUID FILLED ORAL at 20:53

## 2020-07-18 RX ADMIN — GUAIFENESIN 600 MG: 600 TABLET, EXTENDED RELEASE ORAL at 08:19

## 2020-07-18 RX ADMIN — HYDROCODONE BITARTRATE AND ACETAMINOPHEN 1 TABLET: 10; 325 TABLET ORAL at 17:57

## 2020-07-18 RX ADMIN — Medication 2 PUFF: at 20:15

## 2020-07-18 RX ADMIN — GUAIFENESIN 600 MG: 600 TABLET, EXTENDED RELEASE ORAL at 20:53

## 2020-07-18 RX ADMIN — ERYTHROMYCIN 500 MG: 250 TABLET, FILM COATED ORAL at 20:53

## 2020-07-18 RX ADMIN — IPRATROPIUM BROMIDE AND ALBUTEROL SULFATE 1 AMPULE: .5; 3 SOLUTION RESPIRATORY (INHALATION) at 20:15

## 2020-07-18 RX ADMIN — LEVOTHYROXINE SODIUM 125 MCG: 125 TABLET ORAL at 07:03

## 2020-07-18 RX ADMIN — TROLAMINE SALICYLATE: 10 CREAM TOPICAL at 14:28

## 2020-07-18 RX ADMIN — DIVALPROEX SODIUM 250 MG: 250 TABLET, DELAYED RELEASE ORAL at 08:19

## 2020-07-18 RX ADMIN — DIVALPROEX SODIUM 250 MG: 250 TABLET, DELAYED RELEASE ORAL at 20:53

## 2020-07-18 RX ADMIN — CLOPIDOGREL BISULFATE 75 MG: 75 TABLET ORAL at 08:19

## 2020-07-18 RX ADMIN — PREDNISONE 7.5 MG: 5 TABLET ORAL at 08:19

## 2020-07-18 RX ADMIN — METFORMIN HYDROCHLORIDE 500 MG: 500 TABLET ORAL at 17:12

## 2020-07-18 RX ADMIN — DOCUSATE SODIUM 100 MG: 100 CAPSULE, LIQUID FILLED ORAL at 08:19

## 2020-07-18 RX ADMIN — TRAZODONE HYDROCHLORIDE 50 MG: 50 TABLET ORAL at 02:09

## 2020-07-18 RX ADMIN — PANTOPRAZOLE SODIUM 40 MG: 40 TABLET, DELAYED RELEASE ORAL at 07:03

## 2020-07-18 RX ADMIN — TROLAMINE SALICYLATE: 10 CREAM TOPICAL at 20:53

## 2020-07-18 RX ADMIN — IPRATROPIUM BROMIDE AND ALBUTEROL SULFATE 1 AMPULE: .5; 3 SOLUTION RESPIRATORY (INHALATION) at 07:37

## 2020-07-18 ASSESSMENT — PAIN SCALES - GENERAL
PAINLEVEL_OUTOF10: 0
PAINLEVEL_OUTOF10: 8
PAINLEVEL_OUTOF10: 0
PAINLEVEL_OUTOF10: 3
PAINLEVEL_OUTOF10: 0

## 2020-07-18 NOTE — PROGRESS NOTES
RESPIRATORY THERAPY ASSESSMENT    Name:  [address] [place] [address] Record Number:  [id number]  Age: 70 y.o. Gender: female  : 1945  Today's Date:  2020  Room:  22092209-01    Assessment     Is the patient being admitted for a COPD or Asthma exacerbation? No   (If yes the patient will be seen every 4 hours for the first 24 hours and then reassessed)    Patient Admission Diagnosis      Allergies  Allergies   Allergen Reactions    Buspar [Buspirone Hcl]        Minimum Predicted Vital Capacity:               Actual Vital Capacity:                    Pulmonary History:copd  Home Oxygen Therapy:  3L  Home Respiratory Therapy:albuterol/breo   Current Respiratory Therapy:  Albuterol/symbicort  Treatment Type: MDI  Medications: Budesonide/Formoterol    Respiratory Severity Index(RSI)   Patients with orders for inhalation medications, oxygen, or any therapeutic treatment modality will be placed on Respiratory Protocol. They will be assessed with the first treatment and at least every 72 hours thereafter. The following severity scale will be used to determine frequency of treatment intervention.     Smoking History: Pulmonary Disease or Smoking History, Greater than 15 pack year = 2    Social History  Social History     Tobacco Use    Smoking status: Former Smoker     Packs/day: 3.00     Years: 47.00     Pack years: 141.00     Last attempt to quit: 2007     Years since quittin.6    Smokeless tobacco: Never Used   Substance Use Topics    Alcohol use: No     Alcohol/week: 0.0 standard drinks    Drug use: No       Recent Surgical History: None = 0  Past Surgical History  Past Surgical History:   Procedure Laterality Date    APPENDECTOMY      taken out with gallbladder    CARDIAC PACEMAKER PLACEMENT      CARDIAC SURGERY      pacemaker     CARDIAC SURGERY      STENT    CHOLECYSTECTOMY      COLONOSCOPY      DIAGNOSTIC CARDIAC CATH LAB PROCEDURE      HYSTERECTOMY  1975    partial    OTHER SURGICAL HISTORY  5/28/13    quadroscopy    PACEMAKER INSERTION      PACEMAKER PLACEMENT      TONSILLECTOMY      TUBAL LIGATION      VASCULAR SURGERY      stent placement       Level of Consciousness: Alert, Oriented, and Cooperative = 0    Level of Activity: Walking with assistance = 1    Respiratory Pattern: Regular Pattern; RR 8-20 = 0    Breath Sounds: Diminshed bilaterally and/or crackles = 2    Sputum   ,  , Sputum How Obtained: Spontaneous cough  Cough: Strong, spontaneous, non-productive = 0    Vital Signs   BP (!) 134/56   Pulse 84   Temp 98 °F (36.7 °C) (Oral)   Resp 16   Ht 5' 2\" (1.575 m)   Wt 189 lb 9.6 oz (86 kg)   SpO2 99%   BMI 34.68 kg/m²   SPO2 (COPD values may differ): 88-89% on room air or greater than 92% on FiO2 28- 35% = 2    Peak Flow (asthma only): not applicable = 0    RSI: 7-8 = BID and Q4HPRN (every four hours as needed) for dyspnea        Plan       Goals: medication delivery    Patient/caregiver was educated on the proper method of use for Respiratory Care Devices:  Yes      Level of patient/caregiver understanding able to:   ? Verbalize understanding   ? Demonstrate understanding       ? Teach back        ? Needs reinforcement       ? No available caregiver               ? Other:     Response to education:  Good     Is patient being placed on Home Treatment Regimen? No     Does the patient have everything they need prior to discharge? NA     Comments: chart reviewed and patient assessed    Plan of Care: keep albuterol scheduled bid per assessment    Electronically signed by Maribeth Johnson RCP on 7/18/2020 at 12:46 AM    Respiratory Protocol Guidelines     1. Assessment and treatment by Respiratory Therapy will be initiated for medication and therapeutic interventions upon initiation of aerosolized medication. 2. Physician will be contacted for respiratory rate (RR) greater than 35 breaths per minute.  Therapy will be held for heart rate (HR) greater than 140 beats per minute, pending direction from physician. 3. Bronchodilators will be administered via Metered Dose Inhaler (MDI) with spacer when the following criteria are met:  a. Alert and cooperative     b. HR < 140 bpm  c. RR < 30 bpm                d. Can demonstrate a 2-3 second inspiratory hold  4. Bronchodilators will be administered via Hand Held Nebulizer ANTOINET[facility]) to patients when ANY of the following criteria are met  a. Incognizant or uncooperative          b. Patients treated with HHN at Home        c. Unable to demonstrate proper use of MDI with spacer     d. RR > 30 bpm   5. Bronchodilators will be delivered via Metered Dose Inhaler (MDI), HHN, Aerogen to intubated patients on mechanical ventilation. 6. Inhalation medication orders will be delivered and/or substituted as outlined below. Aerosolized Medications Ordering and Administration Guidelines:    1. All Medications will be ordered by a physician, and their frequency and/or modality will be adjusted as defined by the patients Respiratory Severity Index (RSI) score. 2. If the patient does not have documented COPD, consider discontinuing anticholinergics when RSI is less than 9.  3. If the bronchospasm worsens (increased RSI), then the bronchodilator frequency can be increased to a maximum of every 4 hours. If greater than every 4 hours is required, the physician will be contacted. 4. If the bronchospasm improves, the frequency of the bronchodilator can be decreased, based on the patient's RSI, but not less than home treatment regimen frequency. 5. Bronchodilator(s) will be discontinued if patient has a RSI less than 9 and has received no scheduled or as needed treatment for 72  Hrs. Patients Ordered on a Mucolytic Agent:    1. Must always be administered with a bronchodilator.     2. Discontinue if patient experiences worsened bronchospasm, or secretions have lessened to the point that the patient is able to clear them with a cough.    Anti-inflammatory and Combination Medications:    1. If the patient lacks prior history of lung disease, is not using inhaled anti-inflammatory medication at home, and lacks wheezing by examination or by history for at least 24 hours, contact physician for possible discontinuation.

## 2020-07-18 NOTE — PLAN OF CARE
Pt is alert and oriented. She was in a wheelchair in the day room watching TV at shift change. She is able to ambulate, but is instructed to call for/wait for assist with ambulation. She has had no syncopal episodes. She denies SI/HI/AVH. She states she feels less depressed. She was anxious and requested Ativan and Norco for back pain 8/10. Both were admin and effective. She was unable to go to sleep and Trazodone was admin. Will monitor effect.

## 2020-07-18 NOTE — PROGRESS NOTES
Pt c/o 8/10 pain in her back. Norco given @ [phone] for pain. Pt also c/o pain in her hands. Requested aspercreme. Aspercreme applied to hands. Pt is currently sitting up in bed eating snacks. Will monitor for effectiveness.

## 2020-07-18 NOTE — PLAN OF CARE
Problem: Falls - Risk of:  Goal: Will remain free from falls  Description: Will remain free from falls  Outcome: Ongoing  Goal: Absence of physical injury  Description: Absence of physical injury  Outcome: Ongoing     Problem: Depressive Behavior With or Without Suicide Precautions:  Goal: Able to verbalize acceptance of life and situations over which he or she has no control  Description: Able to verbalize acceptance of life and situations over which he or she has no control  Outcome: Ongoing  Goal: Able to verbalize and/or display a decrease in depressive symptoms  Description: Able to verbalize and/or display a decrease in depressive symptoms  Outcome: Ongoing    Pt has been pleasant, calm, and cooperative. Brightened mood. Improved activity tolerance. Able to ambulate without issue from her room to the dayroom w/ walker and contact guard assist. Visible, social. One episode of feeling dizzy and reporting that \"the lights are going fuzzy\" while sitting in group. VSS at that time. Pt recovered from this quickly. Pt reported having 3x BM's today. Denies SI/HI/AVH. No RTIS noted.

## 2020-07-18 NOTE — BH NOTE
Olga Pedro was provided with maximum encouragement to attend [employer]. Pt continually declined, stating \"I just don't feel like it. \" Pt was able to verbalize that she felt better when engaged, but she stated she did not have the energy this morning to attend. Pt did not attend group.     Theador Player, ANDREA

## 2020-07-19 LAB
GLUCOSE BLD-MCNC: 105 MG/DL (ref 70–99)
GLUCOSE BLD-MCNC: 117 MG/DL (ref 70–99)
GLUCOSE BLD-MCNC: 138 MG/DL (ref 70–99)
GLUCOSE BLD-MCNC: 207 MG/DL (ref 70–99)
PERFORMED ON: ABNORMAL

## 2020-07-19 PROCEDURE — 6370000000 HC RX 637 (ALT 250 FOR IP): Performed by: PSYCHIATRY & NEUROLOGY

## 2020-07-19 PROCEDURE — 94761 N-INVAS EAR/PLS OXIMETRY MLT: CPT

## 2020-07-19 PROCEDURE — 6370000000 HC RX 637 (ALT 250 FOR IP): Performed by: PHYSICIAN ASSISTANT

## 2020-07-19 PROCEDURE — 99233 SBSQ HOSP IP/OBS HIGH 50: CPT | Performed by: PSYCHIATRY & NEUROLOGY

## 2020-07-19 PROCEDURE — 2700000000 HC OXYGEN THERAPY PER DAY

## 2020-07-19 PROCEDURE — 2580000003 HC RX 258

## 2020-07-19 PROCEDURE — 94640 AIRWAY INHALATION TREATMENT: CPT

## 2020-07-19 PROCEDURE — 1240000000 HC EMOTIONAL WELLNESS R&B

## 2020-07-19 RX ORDER — SODIUM CHLORIDE 0.9 % (FLUSH) 0.9 %
SYRINGE (ML) INJECTION
Status: COMPLETED
Start: 2020-07-19 | End: 2020-07-19

## 2020-07-19 RX ORDER — DIAPER,BRIEF,INFANT-TODD,DISP
EACH MISCELLANEOUS 2 TIMES DAILY
Status: DISCONTINUED | OUTPATIENT
Start: 2020-07-19 | End: 2020-07-22 | Stop reason: HOSPADM

## 2020-07-19 RX ADMIN — HYDROCORTISONE: 1 CREAM TOPICAL at 15:51

## 2020-07-19 RX ADMIN — POTASSIUM CHLORIDE 10 MEQ: 750 TABLET, EXTENDED RELEASE ORAL at 08:08

## 2020-07-19 RX ADMIN — ERYTHROMYCIN 500 MG: 250 TABLET, FILM COATED ORAL at 21:15

## 2020-07-19 RX ADMIN — LORAZEPAM 0.5 MG: 0.5 TABLET ORAL at 21:20

## 2020-07-19 RX ADMIN — HYDROCODONE BITARTRATE AND ACETAMINOPHEN 1 TABLET: 10; 325 TABLET ORAL at 13:57

## 2020-07-19 RX ADMIN — LEVOTHYROXINE SODIUM 125 MCG: 125 TABLET ORAL at 06:07

## 2020-07-19 RX ADMIN — DIVALPROEX SODIUM 250 MG: 250 TABLET, DELAYED RELEASE ORAL at 08:46

## 2020-07-19 RX ADMIN — GUAIFENESIN 600 MG: 600 TABLET, EXTENDED RELEASE ORAL at 08:08

## 2020-07-19 RX ADMIN — Medication 2 PUFF: at 20:30

## 2020-07-19 RX ADMIN — GUAIFENESIN 600 MG: 600 TABLET, EXTENDED RELEASE ORAL at 21:16

## 2020-07-19 RX ADMIN — IPRATROPIUM BROMIDE AND ALBUTEROL SULFATE 1 AMPULE: .5; 3 SOLUTION RESPIRATORY (INHALATION) at 20:30

## 2020-07-19 RX ADMIN — PANTOPRAZOLE SODIUM 40 MG: 40 TABLET, DELAYED RELEASE ORAL at 06:07

## 2020-07-19 RX ADMIN — IPRATROPIUM BROMIDE AND ALBUTEROL SULFATE 1 AMPULE: .5; 3 SOLUTION RESPIRATORY (INHALATION) at 07:57

## 2020-07-19 RX ADMIN — HYDROCORTISONE: 1 CREAM TOPICAL at 21:21

## 2020-07-19 RX ADMIN — Medication 10 ML: at 21:16

## 2020-07-19 RX ADMIN — METFORMIN HYDROCHLORIDE 500 MG: 500 TABLET ORAL at 15:52

## 2020-07-19 RX ADMIN — CLOPIDOGREL BISULFATE 75 MG: 75 TABLET ORAL at 08:08

## 2020-07-19 RX ADMIN — METFORMIN HYDROCHLORIDE 500 MG: 500 TABLET ORAL at 08:08

## 2020-07-19 RX ADMIN — DOCUSATE SODIUM 100 MG: 100 CAPSULE, LIQUID FILLED ORAL at 21:15

## 2020-07-19 RX ADMIN — DIVALPROEX SODIUM 250 MG: 250 TABLET, DELAYED RELEASE ORAL at 21:16

## 2020-07-19 RX ADMIN — PREDNISONE 7.5 MG: 5 TABLET ORAL at 08:08

## 2020-07-19 RX ADMIN — Medication 2 PUFF: at 07:59

## 2020-07-19 ASSESSMENT — PAIN SCALES - GENERAL
PAINLEVEL_OUTOF10: 0
PAINLEVEL_OUTOF10: 8
PAINLEVEL_OUTOF10: 0

## 2020-07-19 NOTE — PLAN OF CARE
Pt is alert and oriented. She requests assist with ambulation and during her bathroom routine. Her gait has been steady using a walker. She has not displayed any syncopal episodes thus far this shift. Her mood remains depressed, but she states she is feeling better. She socializes with her peers when sitting in the common area. She denies SI/HI/AVH. No RTIS noted.

## 2020-07-19 NOTE — PROGRESS NOTES
Department of Psychiatry  Attending Progress Note  Chief Complaint: Shashi Benson was bright and interactive today . There was no confusion as we discussed her stay. She stated that since her meds have been reduced, she is clearer and more organized. Nawaf Conley has been active in program. Using walker but requires assistance at times. Patient's chart was reviewed and collaborated with  about the treatment plan. SUBJECTIVE:    Patient is feeling better. Suicidal ideation:  denies suicidal ideation. Patient does not have medication side effects. ROS: Patient has new complaints: no  Sleeping adequately:  Yes   Appetite adequate: Yes  Attending groups: Yes  Visitors:No    OBJECTIVE    Physical  VITALS:  BP (!) 101/46   Pulse 74   Temp 97 °F (36.1 °C) (Oral)   Resp 16   Ht 5' 2\" (1.575 m)   Wt 191 lb 9.6 oz (86.9 kg)   SpO2 99%   BMI 35.04 kg/m²     Mental Status Examination:  Patients appearance was hospital attire. Thoughts are Goal directed. Homicidal ideations none. No abnormal movements, tics or mannerisms. Memory intact Aims 0. Concentration Good. Alert and oriented X 4. Insight and Judgement impaired insight. Patient was cooperative. Patient gait abnormal. Mood within normal limits, affect normal affect Hallucinations Absent, suicidal ideations no specific plan to harm self Speech normal volume  Data  Labs:   No results displayed because visit has over 200 results.                Medications  Current Facility-Administered Medications: metFORMIN (GLUCOPHAGE) tablet 500 mg, 500 mg, Oral, BID WC  traZODone (DESYREL) tablet 50 mg, 50 mg, Oral, Nightly PRN  furosemide (LASIX) tablet 20 mg, 20 mg, Oral, Daily PRN  insulin lispro (HUMALOG) injection vial 0-6 Units, 0-6 Units, Subcutaneous, TID WC  insulin lispro (HUMALOG) injection vial 0-3 Units, 0-3 Units, Subcutaneous, Nightly  glucose (GLUTOSE) 40 % oral gel 15 g, 15 g, Oral, PRN  dextrose 50 % IV solution, 12.5 g, Intravenous, PRN  glucagon (rDNA) injection 1 mg, 1 mg, Intramuscular, PRN  dextrose 5 % solution, 100 mL/hr, Intravenous, PRN  ipratropium-albuterol (DUONEB) nebulizer solution 1 ampule, 1 ampule, Inhalation, BID  guaiFENesin (MUCINEX) extended release tablet 600 mg, 600 mg, Oral, BID  trolamine salicylate (ASPERCREME) 10 % cream, , Topical, BID PRN  albuterol sulfate  (90 Base) MCG/ACT inhaler 2 puff, 2 puff, Inhalation, Q4H PRN  acetaminophen (TYLENOL) tablet 650 mg, 650 mg, Oral, Q4H PRN  haloperidol lactate (HALDOL) injection 2 mg, 2 mg, Intramuscular, Q6H PRN **OR** haloperidol (HALDOL) tablet 2 mg, 2 mg, Oral, Q6H PRN  benztropine mesylate (COGENTIN) injection 1 mg, 1 mg, Intramuscular, BID PRN  magnesium hydroxide (MILK OF MAGNESIA) 400 MG/5ML suspension 30 mL, 30 mL, Oral, Daily PRN  aluminum & magnesium hydroxide-simethicone (MAALOX) 200-200-20 MG/5ML suspension 30 mL, 30 mL, Oral, Q6H PRN  clopidogrel (PLAVIX) tablet 75 mg, 75 mg, Oral, Daily  divalproex (DEPAKOTE) DR tablet 250 mg, 250 mg, Oral, BID  erythromycin base (E-MYCIN) tablet 500 mg, 500 mg, Oral, Nightly  pantoprazole (PROTONIX) tablet 40 mg, 40 mg, Oral, QAM AC  docusate sodium (COLACE) capsule 100 mg, 100 mg, Oral, BID  budesonide-formoterol (SYMBICORT) 160-4.5 MCG/ACT inhaler 2 puff, 2 puff, Inhalation, BID  HYDROcodone-acetaminophen (NORCO)  MG per tablet 1 tablet, 1 tablet, Oral, Q6H PRN  levothyroxine (SYNTHROID) tablet 125 mcg, 125 mcg, Oral, Daily  potassium chloride (KLOR-CON M) extended release tablet 10 mEq, 10 mEq, Oral, Daily with breakfast  predniSONE (DELTASONE) tablet 7.5 mg, 7.5 mg, Oral, Daily  LORazepam (ATIVAN) tablet 0.5 mg, 0.5 mg, Oral, Q8H PRN **OR** [DISCONTINUED] LORazepam (ATIVAN) injection 1 mg, 1 mg, Intramuscular, Q6H PRN    ASSESSMENT AND PLAN    Active Problems:    Chronic obstructive pulmonary disease (HCC)    Type II diabetes mellitus, well controlled (HCC)    MDD (major depressive disorder), single episode Chronic ischemic heart disease    Pacemaker    Chronic respiratory failure with hypoxia (HCC)    Dysuria    Cough    ANA (acute kidney injury) ([facility] [address]  Resolved Problems:    * No resolved hospital problems. *       1. Patient s symptoms   are improving  2. Probable discharge is next week  3. Discharge planning is incomplete  4. Suicidal ideation is none  5. Total time with patient was 40 minutes and more than 50 % of that time was spent counseling the patient on their symptoms, treatment and expected goals.

## 2020-07-19 NOTE — GROUP NOTE
Group Therapy Note    Date: 7/19/2020    Group Start Time: 1030  Group End Time: 2368  Group Topic: [address] [facility]        Group Therapy Note    Attendees: 6    Patient's Goal: to actively participate in group discussion regarding emotion identification. Pts were encouraged to practice emotion identification by listening to music utilized, identifying their emotional reaction, and communicating those emotions with peers to increase emotion identification, improve communication skills, and improve overall mood. Notes:  Ezio Flores actively engaged in group throughout. Pt required redirection at times but was easily redirected to task. Ezio Flores actively participated in processing discussion. Pt received discussion list at the conclusion of group. Status After Intervention:  Improved    Participation Level:  Active Listener and Interactive    Participation Quality: Appropriate, Attentive, Sharing and Supportive      Speech:  normal      Thought Process/Content: Linear      Affective Functioning: Constricted/Restricted      Mood: anxious      Level of consciousness:  Alert and Attentive      Response to Learning: Able to verbalize current knowledge/experience, Able to verbalize/acknowledge new learning, Capable of insight and Progressing to goal      Endings: None Reported    Modes of Intervention: Education, Support, Socialization, Clarifying, Problem-solving, Activity and Media      Discipline Responsible: Psychoeducational Specialist      Signature:  ANDREA Hyde

## 2020-07-20 LAB
GLUCOSE BLD-MCNC: 112 MG/DL (ref 70–99)
GLUCOSE BLD-MCNC: 126 MG/DL (ref 70–99)
GLUCOSE BLD-MCNC: 136 MG/DL (ref 70–99)
GLUCOSE BLD-MCNC: 149 MG/DL (ref 70–99)
PERFORMED ON: ABNORMAL

## 2020-07-20 PROCEDURE — 6370000000 HC RX 637 (ALT 250 FOR IP): Performed by: PHYSICIAN ASSISTANT

## 2020-07-20 PROCEDURE — 94761 N-INVAS EAR/PLS OXIMETRY MLT: CPT

## 2020-07-20 PROCEDURE — 99233 SBSQ HOSP IP/OBS HIGH 50: CPT | Performed by: PSYCHIATRY & NEUROLOGY

## 2020-07-20 PROCEDURE — 94640 AIRWAY INHALATION TREATMENT: CPT

## 2020-07-20 PROCEDURE — 99231 SBSQ HOSP IP/OBS SF/LOW 25: CPT | Performed by: NURSE PRACTITIONER

## 2020-07-20 PROCEDURE — 2700000000 HC OXYGEN THERAPY PER DAY

## 2020-07-20 PROCEDURE — 6370000000 HC RX 637 (ALT 250 FOR IP): Performed by: PSYCHIATRY & NEUROLOGY

## 2020-07-20 PROCEDURE — 2580000003 HC RX 258

## 2020-07-20 PROCEDURE — 1240000000 HC EMOTIONAL WELLNESS R&B

## 2020-07-20 RX ORDER — SODIUM CHLORIDE 0.9 % (FLUSH) 0.9 %
SYRINGE (ML) INJECTION
Status: COMPLETED
Start: 2020-07-20 | End: 2020-07-20

## 2020-07-20 RX ADMIN — SODIUM CHLORIDE, PRESERVATIVE FREE: 5 INJECTION INTRAVENOUS at 09:56

## 2020-07-20 RX ADMIN — METFORMIN HYDROCHLORIDE 500 MG: 500 TABLET ORAL at 08:40

## 2020-07-20 RX ADMIN — METFORMIN HYDROCHLORIDE 500 MG: 500 TABLET ORAL at 16:40

## 2020-07-20 RX ADMIN — Medication 2 PUFF: at 20:57

## 2020-07-20 RX ADMIN — IPRATROPIUM BROMIDE AND ALBUTEROL SULFATE 1 AMPULE: .5; 3 SOLUTION RESPIRATORY (INHALATION) at 20:57

## 2020-07-20 RX ADMIN — Medication 10 ML: at 16:40

## 2020-07-20 RX ADMIN — ERYTHROMYCIN 500 MG: 250 TABLET, FILM COATED ORAL at 21:06

## 2020-07-20 RX ADMIN — DOCUSATE SODIUM 100 MG: 100 CAPSULE, LIQUID FILLED ORAL at 08:41

## 2020-07-20 RX ADMIN — HYDROCODONE BITARTRATE AND ACETAMINOPHEN 1 TABLET: 10; 325 TABLET ORAL at 12:57

## 2020-07-20 RX ADMIN — LORAZEPAM 0.5 MG: 0.5 TABLET ORAL at 21:27

## 2020-07-20 RX ADMIN — DOCUSATE SODIUM 100 MG: 100 CAPSULE, LIQUID FILLED ORAL at 21:07

## 2020-07-20 RX ADMIN — PANTOPRAZOLE SODIUM 40 MG: 40 TABLET, DELAYED RELEASE ORAL at 06:10

## 2020-07-20 RX ADMIN — GUAIFENESIN 600 MG: 600 TABLET, EXTENDED RELEASE ORAL at 08:40

## 2020-07-20 RX ADMIN — CLOPIDOGREL BISULFATE 75 MG: 75 TABLET ORAL at 08:40

## 2020-07-20 RX ADMIN — LEVOTHYROXINE SODIUM 125 MCG: 125 TABLET ORAL at 06:10

## 2020-07-20 RX ADMIN — HYDROCODONE BITARTRATE AND ACETAMINOPHEN 1 TABLET: 10; 325 TABLET ORAL at 21:27

## 2020-07-20 RX ADMIN — GUAIFENESIN 600 MG: 600 TABLET, EXTENDED RELEASE ORAL at 21:07

## 2020-07-20 RX ADMIN — TROLAMINE SALICYLATE: 10 CREAM TOPICAL at 21:07

## 2020-07-20 RX ADMIN — HYDROCORTISONE: 1 CREAM TOPICAL at 21:29

## 2020-07-20 RX ADMIN — POTASSIUM CHLORIDE 10 MEQ: 750 TABLET, EXTENDED RELEASE ORAL at 08:41

## 2020-07-20 RX ADMIN — INSULIN LISPRO 1 UNITS: 100 INJECTION, SOLUTION INTRAVENOUS; SUBCUTANEOUS at 21:06

## 2020-07-20 RX ADMIN — PREDNISONE 7.5 MG: 5 TABLET ORAL at 08:41

## 2020-07-20 RX ADMIN — DIVALPROEX SODIUM 250 MG: 250 TABLET, DELAYED RELEASE ORAL at 21:07

## 2020-07-20 RX ADMIN — DIVALPROEX SODIUM 250 MG: 250 TABLET, DELAYED RELEASE ORAL at 08:40

## 2020-07-20 ASSESSMENT — PAIN SCALES - GENERAL
PAINLEVEL_OUTOF10: 0
PAINLEVEL_OUTOF10: 6
PAINLEVEL_OUTOF10: 0
PAINLEVEL_OUTOF10: 5
PAINLEVEL_OUTOF10: 8
PAINLEVEL_OUTOF10: 0
PAINLEVEL_OUTOF10: 0

## 2020-07-20 NOTE — BH NOTE
Olga Pedro is AOx4 this morning, bright and interactive with staff. She states she feels much clearer than earlier in the week, and feels better walking around. She denies SIHI and AVH and feels that she has a lot to live for. Her only concern is not making it to the bathroom on time from the day renate, discussed requesting assistance before it is an urgent need, verbalized understanding. Complains of discomfort related to hemorrhoid, applying cream as directed, will obtain waffle cushion to relieve pressure.

## 2020-07-20 NOTE — PROGRESS NOTES
Department of Psychiatry  Attending Progress Note    Admission Date:    7/9/2020    Chief complaint / Reason for Admission:  Altered mental status and suicidal ideation    Patient's chart was reviewed, case was discussed with nursing/OT/RT staff, and collaborated with  about the treatment plan. SUBJECTIVE:   Over last 24 hours:  Behavioral outbursts: No   Non-aggressive behavioral disturbance: No  Medication compliant: Yes  Need for seclusion/restraints: No  Sleeping adequately:  Improved  Appetite adequate: Yes  Attending groups: Yes    Pt's dizziness seems to have been better over the weekend. She continues to deny SI. Seen by PT who agree with SNF recommendation. Pt is agreement. Progressing overall: Psychiatrically improved, but medically tenuous.   Suicidal ideation: Denies  Homicidal ideation: Denies  Medication side effects: No    ROS: Patient has new complaints: No    Current Medications Ordered:   hydrocortisone   Topical BID    metFORMIN  500 mg Oral BID WC    insulin lispro  0-6 Units Subcutaneous TID WC    insulin lispro  0-3 Units Subcutaneous Nightly    ipratropium-albuterol  1 ampule Inhalation BID    guaiFENesin  600 mg Oral BID    clopidogrel  75 mg Oral Daily    divalproex  250 mg Oral BID    erythromycin base  500 mg Oral Nightly    pantoprazole  40 mg Oral QAM AC    docusate sodium  100 mg Oral BID    budesonide-formoterol  2 puff Inhalation BID    levothyroxine  125 mcg Oral Daily    potassium chloride  10 mEq Oral Daily with breakfast    predniSONE  7.5 mg Oral Daily      PRN Meds: traZODone, furosemide, glucose, dextrose, glucagon (rDNA), dextrose, trolamine salicylate, albuterol sulfate HFA, acetaminophen, haloperidol lactate **OR** haloperidol, benztropine mesylate, magnesium hydroxide, aluminum & magnesium hydroxide-simethicone, HYDROcodone-acetaminophen, LORazepam **OR** [DISCONTINUED] LORazepam     Objective:     PE:    /61   Pulse 75   Temp 97 °F (36.1 °C) (Oral)   Resp 18   Ht 5' 2\" (1.575 m)   Wt 192 lb (87.1 kg)   SpO2 100%   BMI 35.12 kg/m²       Motor / Gait: No significant PMR/PMA, nml tone, no involuntary movements, gait aided by walker     Mental Status Examination:    Appearance: WF, appears stated age, wearing hospital gown, improved grooming and hygiene  Behavior/Attitude toward examiner:  Cooperative, improved attention, improvedEC  Speech:  Normal volume, amount, not halting, +word finding difficulty  Mood:  \"All right\"  Affect:  Constricted  Thought processes:  Linear  Thought Content: Denies SI, denies HI, no delusions voiced, no obsessions, no confabulation today  Perceptions: Denies active AVH, not actively  RTIS  Attention: Improved  Abstraction: [place]  Cognition: Average AYDE, Alert and oriented to person, place, time, and situation, recall improved  Insight: Improved insight   Judgment: Improved judgment      LAB: Reviewed labs from last 24 hours      Assessment and Plan:     Diagnoses:   Primary Psychiatric (DSM V) Diagnosis: Delirium due to polypharmacy, mixed level of activity, resolving  Secondary Psychiatric (DSM V) Diagnoses: Bipolar disorder, currently depressed, moderate  Chemical Dependency Diagnoses: Tobacco use disorder, in remission  Active Medical Diagnoses:       Patient Active Problem List   Diagnosis    Pneumonia    Chronic obstructive pulmonary disease ([address]    Type II diabetes mellitus, well controlled ([address]    CAD (coronary artery disease)    Acute-on-chronic respiratory failure ([address]    Sepsis ([address]    Pleural effusion    Hyperkalemia    CAP (community acquired pneumonia)    Chest pain    Chest pressure    Shortness of breath    History of coronary artery disease    MDD (major depressive disorder), single episode    Chronic ischemic heart disease    Pacemaker    Chronic respiratory failure with hypoxia ([address]     All conditions detailed above are being treated while patient is hospitalized.    Tx plan: Generally: prevent self injury/aggression, stabilize mood/anxiety/psychotic/behavioral disturbance, establish/maintain aftercare, increase coping mechanisms, improve medication compliance.  All conditions present on admission are being treated while pt is hospitalized.      Legal Status: Voluntary     Primary Psychiatric Issues:  1. Delirium; Bipolar depression:  -D/C'd adderall. No indication and risk of contributing to mood instability and cycling. -D/C'd donepezil. Patient does not have a formal AD diagnosis, and AChIs are contraindicated in severe COPD. -Continue divalproex 250 mg BID. Level 43.7  -D/C'd quetiapine given persistent dizziness.     Chemical Dependency Issues:  Patient no longer smoker.     Function:  -Consult physical therapy  -Consulted occupational therapy - no recommending SNF.  -Falls precautions     Medical Problems:  Internal medicine has been consulted. Appreciate recs.     Code Status: DNR     Disposition:    -Housing: Own home. SNF now recommended.  -Current outpatient follow-up: [facility]     Estimated length of stay: 3 to 5 days     Criteria for Discharge:  Not psychotic, not homicidal, not suicidal, behavioral disturbance under control, sleeping well, mood improved/stable, eating well, aftercare arranged. Total face to face time with patient was 35 minutes and more than 50 % of that time was spent counseling the patient on their symptoms, treatment and expected goals.     Rima Benton MD  Staff Psychiatrist

## 2020-07-20 NOTE — PLAN OF CARE
Pt c/o backpain rated at a 8 on 0-10 scale, rest and reposition was ineffective administered Norco 10/325 mg po at 1300, will monitor for effectiveness.

## 2020-07-20 NOTE — GROUP NOTE
Group Therapy Note    Date: 7/20/2020    Group Start Time: 1020  Group End Time: 1130  Group Topic: Healthy Living/Wellness    [address]        Group Therapy Note    Attendees: 5    Patient's Goal: To identify the positive benefits of exercising and to engage in playing exercise chair bingo to improve self-esteem, mood, mobility, and overall health, wellness, and quality of life. Notes: Ada Blanca actively participated in group activity. Ada Blanca identified the positive benefits of engaging in physical activities to improve overall health. Ada Blanca discussed her physical health limitations and ways to appropriately modify exercises to engage in. Ada Blanca expressed enjoyment of group activity. Status After Intervention:  Improved    Participation Level:  Active Listener and Interactive    Participation Quality: Appropriate, Attentive and Sharing      Speech:  normal      Thought Process/Content: Linear      Affective Functioning: Congruent      Mood: anxious      Level of consciousness:  Alert and Attentive      Response to Learning: Able to verbalize current knowledge/experience, Able to verbalize/acknowledge new learning and Progressing to goal      Endings: None Reported    Modes of Intervention: Education, Support, Socialization, Exploration, Clarifying, Problem-solving, Activity and Movement      Discipline Responsible: Psychoeducational Specialist      Signature:  Kate Smalls, [address]

## 2020-07-20 NOTE — PROGRESS NOTES
LORazepam **OR** [DISCONTINUED] LORazepam    CULTURES  None     RADIOLOGY    XR CHEST PORTABLE 7/9/2020   Final Result   No acute cardiopulmonary disease. CT Head WO Contrast 7/9/2020   Final Result   No acute intracranial abnormality. Assessment/Plan:    Hallucinations  Depression  - cont mgmt per BHI    Orthostatic Hypotension - Resolved  Mild ANA - improved  - Cr 1.1  - held Lasix, stop Metformin, s/p 2x 250 cc NS bolus  - repeat BMP - Cr 1, continues to c/o lightheadedness w/ ambulation  - s/p 500 cc bolus   - resumed Metformin.  Keep lasix prn.      Dysuria - Resolved  - checked UA with reflex to cx - neg  - no signs of infection    Cough - improved  COPD - no AE  Chronic Hypoxic Respiratory Failure  Chronic Steroid Use  - on 3L NC, stable, no c/o SOB  - cont Symbicort, cont prednisone, PRN albuterol  - add Mucinex and Duonebs; pt is afebrile     DM Type II  - well controlled  - hold Metformin 2/2 above, add low dose SSI  - daily POC glucose     GERD  - cont Protonix     Chronic Ischemic Heart Disease  - last echo from 2016 with normal EF  - appears compensated  - low Na diet, daily weights  - on Lasix w/ K supplements, held Lasix 2/2 positive orthostatics and ANA  - improved, rudd to PRN Lasix     SSS  - s/p pacemaker     Hypothyroidism  - home dose of synthroid 125 mcg       Chronic Pain with Opioids Dependence  - cont PRN Norco - add PRN parameters     Previously enrolled in hospice    Damion Ramirez FNP-C 1:57 PM 7/20/2020

## 2020-07-20 NOTE — PLAN OF CARE
Problem: Coping:  Goal: Ability to remain calm will improve  Description: Ability to remain calm will improve  Outcome: Ongoing     Problem: Safety:  Goal: Ability to remain free from injury will improve  Description: Ability to remain free from injury will improve  Outcome: Ongoing     Problem: Self-Care:  Goal: Ability to participate in self-care as condition permits will improve  Description: Ability to participate in self-care as condition permits will improve  Outcome: Ongoing     Problem: Depressive Behavior With or Without Suicide Precautions:  Goal: Able to verbalize acceptance of life and situations over which he or she has no control  Description: Able to verbalize acceptance of life and situations over which he or she has no control  Outcome: Ongoing   Temitope Amador is brighter and verbalizing looking forward to discharge, but is not anxious about when and where she will be going. She shows insight that she is taking better care of herself, feels she has life left to live, and overall more positive and forward thinking. Independent ambulation is improving requiring assistance to manage O2 line only.

## 2020-07-20 NOTE — PROGRESS NOTES
Occupational Therapy  Attempt Note    Attempted to see pt for OT treatment x2 this date. Pt unable to participate d/t lunch and participating in group. Will re-attempt as schedule allows.      Donavon Nelson OTR/L #[id number]

## 2020-07-21 LAB
GLUCOSE BLD-MCNC: 129 MG/DL (ref 70–99)
GLUCOSE BLD-MCNC: 137 MG/DL (ref 70–99)
GLUCOSE BLD-MCNC: 137 MG/DL (ref 70–99)
GLUCOSE BLD-MCNC: 138 MG/DL (ref 70–99)
PERFORMED ON: ABNORMAL

## 2020-07-21 PROCEDURE — 6370000000 HC RX 637 (ALT 250 FOR IP): Performed by: PSYCHIATRY & NEUROLOGY

## 2020-07-21 PROCEDURE — 6370000000 HC RX 637 (ALT 250 FOR IP): Performed by: PHYSICIAN ASSISTANT

## 2020-07-21 PROCEDURE — 97535 SELF CARE MNGMENT TRAINING: CPT

## 2020-07-21 PROCEDURE — 94640 AIRWAY INHALATION TREATMENT: CPT

## 2020-07-21 PROCEDURE — 97530 THERAPEUTIC ACTIVITIES: CPT

## 2020-07-21 PROCEDURE — 2700000000 HC OXYGEN THERAPY PER DAY

## 2020-07-21 PROCEDURE — 99232 SBSQ HOSP IP/OBS MODERATE 35: CPT | Performed by: PSYCHIATRY & NEUROLOGY

## 2020-07-21 PROCEDURE — 1240000000 HC EMOTIONAL WELLNESS R&B

## 2020-07-21 PROCEDURE — 94761 N-INVAS EAR/PLS OXIMETRY MLT: CPT

## 2020-07-21 RX ORDER — TRAZODONE HYDROCHLORIDE 50 MG/1
50 TABLET ORAL NIGHTLY
Status: DISCONTINUED | OUTPATIENT
Start: 2020-07-21 | End: 2020-07-22 | Stop reason: HOSPADM

## 2020-07-21 RX ADMIN — HYDROCORTISONE: 1 CREAM TOPICAL at 10:30

## 2020-07-21 RX ADMIN — DOCUSATE SODIUM 100 MG: 100 CAPSULE, LIQUID FILLED ORAL at 21:01

## 2020-07-21 RX ADMIN — Medication 2 PUFF: at 08:24

## 2020-07-21 RX ADMIN — PANTOPRAZOLE SODIUM 40 MG: 40 TABLET, DELAYED RELEASE ORAL at 09:12

## 2020-07-21 RX ADMIN — HYDROCORTISONE: 1 CREAM TOPICAL at 21:01

## 2020-07-21 RX ADMIN — DIVALPROEX SODIUM 250 MG: 250 TABLET, DELAYED RELEASE ORAL at 21:01

## 2020-07-21 RX ADMIN — DIVALPROEX SODIUM 250 MG: 250 TABLET, DELAYED RELEASE ORAL at 09:13

## 2020-07-21 RX ADMIN — CLOPIDOGREL BISULFATE 75 MG: 75 TABLET ORAL at 09:13

## 2020-07-21 RX ADMIN — TRAZODONE HYDROCHLORIDE 50 MG: 50 TABLET ORAL at 21:01

## 2020-07-21 RX ADMIN — METFORMIN HYDROCHLORIDE 500 MG: 500 TABLET ORAL at 17:42

## 2020-07-21 RX ADMIN — GUAIFENESIN 600 MG: 600 TABLET, EXTENDED RELEASE ORAL at 09:12

## 2020-07-21 RX ADMIN — Medication 2 PUFF: at 20:39

## 2020-07-21 RX ADMIN — ERYTHROMYCIN 500 MG: 250 TABLET, FILM COATED ORAL at 21:01

## 2020-07-21 RX ADMIN — HYDROCODONE BITARTRATE AND ACETAMINOPHEN 1 TABLET: 10; 325 TABLET ORAL at 21:00

## 2020-07-21 RX ADMIN — DOCUSATE SODIUM 100 MG: 100 CAPSULE, LIQUID FILLED ORAL at 09:12

## 2020-07-21 RX ADMIN — TROLAMINE SALICYLATE: 10 CREAM TOPICAL at 21:01

## 2020-07-21 RX ADMIN — IPRATROPIUM BROMIDE AND ALBUTEROL SULFATE 1 AMPULE: .5; 3 SOLUTION RESPIRATORY (INHALATION) at 20:39

## 2020-07-21 RX ADMIN — METFORMIN HYDROCHLORIDE 500 MG: 500 TABLET ORAL at 09:15

## 2020-07-21 RX ADMIN — LEVOTHYROXINE SODIUM 125 MCG: 125 TABLET ORAL at 09:12

## 2020-07-21 RX ADMIN — HYDROCODONE BITARTRATE AND ACETAMINOPHEN 1 TABLET: 10; 325 TABLET ORAL at 14:56

## 2020-07-21 RX ADMIN — IPRATROPIUM BROMIDE AND ALBUTEROL SULFATE 1 AMPULE: .5; 3 SOLUTION RESPIRATORY (INHALATION) at 08:24

## 2020-07-21 RX ADMIN — POTASSIUM CHLORIDE 10 MEQ: 750 TABLET, EXTENDED RELEASE ORAL at 09:16

## 2020-07-21 RX ADMIN — PREDNISONE 7.5 MG: 5 TABLET ORAL at 09:13

## 2020-07-21 RX ADMIN — GUAIFENESIN 600 MG: 600 TABLET, EXTENDED RELEASE ORAL at 21:01

## 2020-07-21 ASSESSMENT — PAIN SCALES - GENERAL
PAINLEVEL_OUTOF10: 3
PAINLEVEL_OUTOF10: 8
PAINLEVEL_OUTOF10: 0
PAINLEVEL_OUTOF10: 0
PAINLEVEL_OUTOF10: 7
PAINLEVEL_OUTOF10: 5

## 2020-07-21 NOTE — PROGRESS NOTES
Department of Psychiatry  Attending Progress Note    Admission Date:    7/9/2020    Chief complaint / Reason for Admission:  Altered mental status and suicidal ideation    Patient's chart was reviewed, case was discussed with nursing/OT/RT staff, and collaborated with  about the treatment plan. SUBJECTIVE:   Over last 24 hours:  Behavioral outbursts: No   Non-aggressive behavioral disturbance: No  Medication compliant: Yes  Need for seclusion/restraints: No  Sleeping adequately:  Improved  Appetite adequate: Yes  Attending groups: Yes    Medically patient is improved. Her blood pressure has been stable she is not experiencing any dizziness or pre-syncope. She has had no staring spells. Today she is complaining about both difficulty sleeping as well as pain in her back which he feels is insufficiently controlled by her pain medication. I had a lengthy conversation with her about the need to be very cautious with additional medications given her recent cognitive and medical issues during this admission.     Progressing overall: Improving  Suicidal ideation: Denies  Homicidal ideation: Denies  Medication side effects: No    ROS: Patient has new complaints: No    Current Medications Ordered:   traZODone  50 mg Oral Nightly    hydrocortisone   Topical BID    metFORMIN  500 mg Oral BID WC    insulin lispro  0-6 Units Subcutaneous TID WC    insulin lispro  0-3 Units Subcutaneous Nightly    ipratropium-albuterol  1 ampule Inhalation BID    guaiFENesin  600 mg Oral BID    clopidogrel  75 mg Oral Daily    divalproex  250 mg Oral BID    erythromycin base  500 mg Oral Nightly    pantoprazole  40 mg Oral QAM AC    docusate sodium  100 mg Oral BID    budesonide-formoterol  2 puff Inhalation BID    levothyroxine  125 mcg Oral Daily    potassium chloride  10 mEq Oral Daily with breakfast    predniSONE  7.5 mg Oral Daily      PRN Meds: furosemide, glucose, dextrose, glucagon (rDNA), dextrose, trolamine salicylate, albuterol sulfate HFA, acetaminophen, haloperidol lactate **OR** haloperidol, benztropine mesylate, magnesium hydroxide, aluminum & magnesium hydroxide-simethicone, HYDROcodone-acetaminophen, LORazepam **OR** [DISCONTINUED] LORazepam     Objective:     PE:    BP (!) 108/59   Pulse 74   Temp 97 °F (36.1 °C) (Oral)   Resp 16   Ht 5' 2\" (1.575 m)   Wt 192 lb 8 oz (87.3 kg)   SpO2 99%   BMI 35.21 kg/m²       Motor / Gait: No significant PMR/PMA, nml tone, no involuntary movements, gait aided by walker     Mental Status Examination:    Appearance: WF, appears stated age, wearing hospital gown, improved grooming and hygiene  Behavior/Attitude toward examiner:  Cooperative, improved attention, improved EC  Speech:  Normal volume, amount, not halting, +word finding difficulty -Improved  Mood:  Anxious  Affect:  Congruent  Thought processes:  Linear  Thought Content: Denies SI, denies HI, no delusions voiced, no obsessions, no confabulation today  Perceptions: Denies active AVH, not actively  RTIS  Attention: Improved  Abstraction: [place]  Cognition: Average AYDE, Alert and oriented to person, place, time, and situation, recall improved  Insight: Improved insight   Judgment: Improved judgment      LAB: Reviewed labs from last 24 hours      Assessment and Plan:     Diagnoses:   Primary Psychiatric (DSM V) Diagnosis: Delirium due to polypharmacy, mixed level of activity, resolving  Secondary Psychiatric (DSM V) Diagnoses: Bipolar disorder, currently depressed, moderate  Chemical Dependency Diagnoses: Tobacco use disorder, in remission  Active Medical Diagnoses:       Patient Active Problem List   Diagnosis    Pneumonia    [facility]    Type II diabetes mellitus, well controlled ([facility]    CAD (coronary artery disease)    Acute-on-chronic respiratory failure ([facility]    Sepsis ([facility]    Pleural effusion    Hyperkalemia    CAP (community acquired pneumonia)    Chest pain  Chest pressure    Shortness of breath    History of coronary artery disease    MDD (major depressive disorder), single episode    Chronic ischemic heart disease    Pacemaker    Chronic respiratory failure with hypoxia ([facility])     All conditions detailed above are being treated while patient is hospitalized.      Tx plan: Generally: prevent self injury/aggression, stabilize mood/anxiety/psychotic/behavioral disturbance, establish/maintain aftercare, increase coping mechanisms, improve medication compliance.  All conditions present on admission are being treated while pt is hospitalized.      Legal Status: Voluntary     Primary Psychiatric Issues:  1. Delirium; Bipolar depression:  -D/C'd adderall. No indication and risk of contributing to mood instability and cycling. -D/C'd donepezil. Patient does not have a formal AD diagnosis, and AChIs are contraindicated in severe COPD. -Continue divalproex 250 mg BID. Level 43.7  -D/C'd quetiapine given persistent dizziness.  -Start a low dose of trazodone 50 mg QHS. Monitor closely for worsened confusion.     Chemical Dependency Issues:  Patient no longer smoker.     Function:  -Consult physical therapy  -Consulted occupational therapy - no recommending SNF.  -Falls precautions     Medical Problems:  Internal medicine has been consulted. Appreciate recs.     Code Status: DNR     Disposition:    -Housing: Own home. SNF now recommended.  -Current outpatient follow-up: [facility]     Estimated length of stay: 3 to 5 days     Criteria for Discharge:  Not psychotic, not homicidal, not suicidal, behavioral disturbance under control, sleeping well, mood improved/stable, eating well, aftercare arranged. Total face to face time with patient was 35 minutes and more than 50 % of that time was spent counseling the patient on their symptoms, treatment and expected goals.     Joana Vick MD  Staff Psychiatrist

## 2020-07-21 NOTE — PLAN OF CARE
Pt is alert and oriented. She denies SI/HI/AVH. She complained of lower back pain 6/10 and anxiety. Ativan and Norco admin at HS. Both were effective. She socializes with her peers and watches TV at times.

## 2020-07-21 NOTE — BH NOTE
Sw briefly met with pt to discuss SNF options. Pt stated she would prefer to stay in the [place] area if possible and was in agreement with SW sending referrals to [facility]( [address] [place] [address]) and [facility]. SW faxed referrals to both facilities.       CHANTEL Garcia

## 2020-07-21 NOTE — BH NOTE
Renzo Koroma is AOx4, and states she did not sleep well last night. She denies SIHI and AVH, endorses chronic pain. Independent ambulation continues to improve. Compliant with medication administration and care.

## 2020-07-21 NOTE — PROGRESS NOTES
sink   Dressing: supervision to pull pants up/down safely. Donned B socks with setup while seated in chair    Pain   No  Rating:NA  Location: NA  Pain Medicine Status: No request made      Cognition    A&O to Person, Place, Time and Situation   Able to follow:  2 step commands    Balance: Good static standing   Fair dynamic standing    Bed mobility: NT    Transfer Training:   Sit to stand:   CGA from chair with RW-complained of minimal light headedness initially  Stand to sit:   Min A/CGA   Bed to Chair:  Not tested  Standard toilet:   CGA  Activity Tolerance   Pt completed therapy session without issue  Seated after ambulation:  SpO2: 94-99% on3L NC  HR: 89    Therapeutic Exercise: NA    Patient Education:   Role of OT, ADL retraining, safety training (O2 tubing management), MOCA    Positioning Needs: In bed with needs met    Family Present:  No    Assessment: Pt tolerated tx session well today without dizziness. Recommending SNF upon discharge as patient functioning below baseline, demonstrates good rehab potential and unable to return home due to pt requiring supplemental O2 and needs training on safe line management and demonstrating decreased balance. Pt does not have 24/7 supervision or assistance available. Pt participated well in therapy session and states he is willing to go to rehab and participate in further OT/PT sessions in order to improve independence and safety with ADLs, mobility and transfers. GOALS  To be met in 3 Visits:  1). Bed to toilet/BSC: Supervision (Goal Met 7/14/20)     To be met in 5 Visits:  1). Supine to/from Sit:             Modified Independent  (Goal Met 7/16/20)  2). Upper Body Bathing:         Supervision  3). Lower Body Bathing:         SBA  4). Upper Body Dressing:       Supervision  5). Lower Body Dressing:       SBA   6).  Pt to demonstrate UE exs x 15 reps with minimal cues      Plan: cont with [address] MS, OTR/L  #[id number]      If patient discharges from this facility prior to next visit, this note will serve as the Discharge Summary

## 2020-07-22 VITALS
BODY MASS INDEX: 35.43 KG/M2 | TEMPERATURE: 96.9 F | WEIGHT: 192.5 LBS | DIASTOLIC BLOOD PRESSURE: 58 MMHG | RESPIRATION RATE: 16 BRPM | HEART RATE: 70 BPM | SYSTOLIC BLOOD PRESSURE: 118 MMHG | OXYGEN SATURATION: 99 % | HEIGHT: 62 IN

## 2020-07-22 LAB
GLUCOSE BLD-MCNC: 111 MG/DL (ref 70–99)
GLUCOSE BLD-MCNC: 147 MG/DL (ref 70–99)
GLUCOSE BLD-MCNC: 168 MG/DL (ref 70–99)
PERFORMED ON: ABNORMAL

## 2020-07-22 PROCEDURE — 6370000000 HC RX 637 (ALT 250 FOR IP): Performed by: PSYCHIATRY & NEUROLOGY

## 2020-07-22 PROCEDURE — 99239 HOSP IP/OBS DSCHRG MGMT >30: CPT | Performed by: PSYCHIATRY & NEUROLOGY

## 2020-07-22 PROCEDURE — 6370000000 HC RX 637 (ALT 250 FOR IP): Performed by: PHYSICIAN ASSISTANT

## 2020-07-22 PROCEDURE — 5130000000 HC BRIDGE APPOINTMENT

## 2020-07-22 PROCEDURE — 94640 AIRWAY INHALATION TREATMENT: CPT

## 2020-07-22 RX ORDER — POTASSIUM CHLORIDE 20 MEQ/1
10 TABLET, EXTENDED RELEASE ORAL DAILY
Qty: 15 TABLET | Refills: 0 | Status: ON HOLD | OUTPATIENT
Start: 2020-07-22 | End: 2021-02-13 | Stop reason: CLARIF

## 2020-07-22 RX ORDER — PREDNISONE 2.5 MG
7.5 TABLET ORAL DAILY
Qty: 90 TABLET | Refills: 0 | Status: SHIPPED | OUTPATIENT
Start: 2020-07-22

## 2020-07-22 RX ORDER — IPRATROPIUM BROMIDE AND ALBUTEROL SULFATE 2.5; .5 MG/3ML; MG/3ML
1 SOLUTION RESPIRATORY (INHALATION) 2 TIMES DAILY
Qty: 180 ML | Refills: 0 | Status: ON HOLD | OUTPATIENT
Start: 2020-07-22 | End: 2021-02-13 | Stop reason: CLARIF

## 2020-07-22 RX ORDER — FUROSEMIDE 20 MG/1
20 TABLET ORAL DAILY PRN
Qty: 30 TABLET | Refills: 0 | Status: ON HOLD | OUTPATIENT
Start: 2020-07-22 | End: 2021-02-13 | Stop reason: CLARIF

## 2020-07-22 RX ORDER — TRAZODONE HYDROCHLORIDE 50 MG/1
50 TABLET ORAL NIGHTLY
Qty: 30 TABLET | Refills: 0 | Status: ON HOLD | OUTPATIENT
Start: 2020-07-22 | End: 2021-02-13 | Stop reason: CLARIF

## 2020-07-22 RX ORDER — LORAZEPAM 0.5 MG/1
0.5 TABLET ORAL EVERY 8 HOURS PRN
Qty: 90 TABLET | Refills: 0 | Status: SHIPPED | OUTPATIENT
Start: 2020-07-22 | End: 2020-08-21

## 2020-07-22 RX ORDER — DIVALPROEX SODIUM 250 MG/1
250 TABLET, DELAYED RELEASE ORAL 2 TIMES DAILY
Qty: 60 TABLET | Refills: 0 | Status: ON HOLD | OUTPATIENT
Start: 2020-07-22 | End: 2021-02-16 | Stop reason: HOSPADM

## 2020-07-22 RX ORDER — ERYTHROMYCIN 500 MG/1
500 TABLET, DELAYED RELEASE ORAL NIGHTLY
Qty: 30 TABLET | Refills: 0 | Status: ON HOLD | OUTPATIENT
Start: 2020-07-22 | End: 2021-02-13 | Stop reason: CLARIF

## 2020-07-22 RX ORDER — HYDROCODONE BITARTRATE AND ACETAMINOPHEN 10; 325 MG/1; MG/1
1 TABLET ORAL EVERY 6 HOURS PRN
Qty: 120 TABLET | Refills: 0 | Status: SHIPPED | OUTPATIENT
Start: 2020-07-22 | End: 2020-08-21

## 2020-07-22 RX ORDER — DIAPER,BRIEF,INFANT-TODD,DISP
EACH MISCELLANEOUS
Qty: 1 TUBE | Refills: 0 | Status: SHIPPED | OUTPATIENT
Start: 2020-07-22 | End: 2020-07-29

## 2020-07-22 RX ORDER — LEVOTHYROXINE SODIUM 0.12 MG/1
125 TABLET ORAL DAILY
Qty: 30 TABLET | Refills: 0 | Status: SHIPPED | OUTPATIENT
Start: 2020-07-22

## 2020-07-22 RX ORDER — DOCUSATE SODIUM 100 MG/1
100 CAPSULE, LIQUID FILLED ORAL 2 TIMES DAILY
Qty: 60 CAPSULE | Refills: 0 | Status: SHIPPED | OUTPATIENT
Start: 2020-07-22

## 2020-07-22 RX ORDER — IPRATROPIUM BROMIDE AND ALBUTEROL SULFATE 2.5; .5 MG/3ML; MG/3ML
1 SOLUTION RESPIRATORY (INHALATION) 2 TIMES DAILY
Qty: 180 ML | Refills: 0 | Status: SHIPPED | OUTPATIENT
Start: 2020-07-22 | End: 2020-07-22 | Stop reason: SDUPTHER

## 2020-07-22 RX ADMIN — DIVALPROEX SODIUM 250 MG: 250 TABLET, DELAYED RELEASE ORAL at 09:03

## 2020-07-22 RX ADMIN — LEVOTHYROXINE SODIUM 125 MCG: 125 TABLET ORAL at 06:49

## 2020-07-22 RX ADMIN — POTASSIUM CHLORIDE 10 MEQ: 750 TABLET, EXTENDED RELEASE ORAL at 09:05

## 2020-07-22 RX ADMIN — Medication 2 PUFF: at 07:13

## 2020-07-22 RX ADMIN — METFORMIN HYDROCHLORIDE 500 MG: 500 TABLET ORAL at 16:51

## 2020-07-22 RX ADMIN — CLOPIDOGREL BISULFATE 75 MG: 75 TABLET ORAL at 09:03

## 2020-07-22 RX ADMIN — DOCUSATE SODIUM 100 MG: 100 CAPSULE, LIQUID FILLED ORAL at 09:03

## 2020-07-22 RX ADMIN — GUAIFENESIN 600 MG: 600 TABLET, EXTENDED RELEASE ORAL at 09:03

## 2020-07-22 RX ADMIN — HYDROCORTISONE: 1 CREAM TOPICAL at 09:05

## 2020-07-22 RX ADMIN — PREDNISONE 7.5 MG: 5 TABLET ORAL at 09:04

## 2020-07-22 RX ADMIN — HYDROCODONE BITARTRATE AND ACETAMINOPHEN 1 TABLET: 10; 325 TABLET ORAL at 16:02

## 2020-07-22 RX ADMIN — METFORMIN HYDROCHLORIDE 500 MG: 500 TABLET ORAL at 09:03

## 2020-07-22 RX ADMIN — PANTOPRAZOLE SODIUM 40 MG: 40 TABLET, DELAYED RELEASE ORAL at 06:49

## 2020-07-22 RX ADMIN — IPRATROPIUM BROMIDE AND ALBUTEROL SULFATE 1 AMPULE: .5; 3 SOLUTION RESPIRATORY (INHALATION) at 07:13

## 2020-07-22 ASSESSMENT — PAIN SCALES - GENERAL
PAINLEVEL_OUTOF10: 6
PAINLEVEL_OUTOF10: 0
PAINLEVEL_OUTOF10: 7
PAINLEVEL_OUTOF10: 0

## 2020-07-22 NOTE — GROUP NOTE
Group Therapy Note    Date: 7/22/2020    Group Start Time: 1100  Group End Time: 5044  Group Topic: Psychoeducation    [facility]        Group Therapy Note    Attendees: 4    Patient's Goal: to engage in discussion regarding anxiety coping strategies, specifically identifying things that the pt can control in anxiety inducing situations, to increase utilization of healthy coping strategies, increase self-awareness, and decrease anxiety. Notes:  Melanie Toro actively engaged in group throughout. Pt identified feeling anxious about going to the new facility. Melanie Toro was able to discuss actions within her control in regard to moving to the new facility. Pt received a copy of the worksheet the group worked on together. Status After Intervention:  Improved    Participation Level:  Active Listener and Interactive    Participation Quality: Appropriate, Attentive and Sharing      Speech:  normal      Thought Process/Content: Linear      Affective Functioning: Constricted/Restricted      Mood: anxious      Level of consciousness:  Alert and Attentive      Response to Learning: Able to verbalize current knowledge/experience, Able to verbalize/acknowledge new learning, Capable of insight and Progressing to goal      Endings: None Reported    Modes of Intervention: Education, Support, Socialization, Clarifying, Problem-solving and Activity      Discipline Responsible: Psychoeducational Specialist      Signature:  ANDREA Herman

## 2020-07-22 NOTE — BH NOTE
SW received a VM from Jeramy at [facility] stating they are unable to work with pt at this time.          CHANTEL Garcia

## 2020-07-22 NOTE — BH NOTE
SOL called [facility] and scheduled discharge transportation for 5:00pm.       SOL updated the pt's sister, Josh Laurent, with the pt's discharge plan.         CHANTEL Garcia

## 2020-07-22 NOTE — BH NOTE
Report called to [facility] at [facility], and AVS and prescriptions sent with transportation for nursing. She verbalized understanding, and phone number provided to call with any questions.

## 2020-07-22 NOTE — DISCHARGE SUMMARY
Geriatric Psychiatry Discharge Summary     Admit Date: 7/9/2020     Discharge Date: 7/22/2020       Discharge Diagnoses:  Primary Psychiatric (DSM V) Diagnosis: Delirium due to polypharmacy, mixed level of activity, resolving  Secondary Psychiatric (DSM V) Diagnoses: Bipolar disorder, currently depressed, moderate  Chemical Dependency Diagnoses: Tobacco use disorder, in remission    All psychiatric conditions and active medical problems above on were treated while patient was hospitalized. Disposition -  Skilled nursing facility     Discharge Meds:       Medication List      START taking these medications    hydrocortisone 1 % cream  Apply topically 2 times daily for hemorrhoids. ipratropium-albuterol 0.5-2.5 (3) MG/3ML Soln nebulizer solution  Commonly known as:  DUONEB  Inhale 3 mLs into the lungs 2 times daily        CHANGE how you take these medications    divalproex 250 MG DR tablet  Commonly known as:  DEPAKOTE  Take 1 tablet by mouth 2 times daily  What changed:  when to take this     docusate sodium 100 MG capsule  Commonly known as:  COLACE  Take 1 capsule by mouth 2 times daily  What changed:    · when to take this  · reasons to take this     Erythromycin 500 MG Tbec  Commonly known as:  Mike-Tab  Take 500 mg by mouth nightly  What changed:    · medication strength  · how much to take     furosemide 20 MG tablet  Commonly known as:  LASIX  Take 1 tablet by mouth daily as needed (Swelling)  What changed:    · medication strength  · when to take this  · reasons to take this     HYDROcodone-acetaminophen  MG per tablet  Commonly known as:  NORCO  Take 1 tablet by mouth every 6 hours as needed for Pain for up to 30 days.   What changed:  when to take this     levothyroxine 125 MCG tablet  Commonly known as:  SYNTHROID  Take 1 tablet by mouth daily  What changed:    · medication strength  · how much to take     LORazepam 0.5 MG tablet  Commonly known as:  ATIVAN  Take 1 tablet by mouth every 8 hours as needed for Anxiety for up to 30 days. What changed:    · medication strength  · how much to take     potassium chloride 20 MEQ extended release tablet  Commonly known as:  KLOR-CON M  Take 0.5 tablets by mouth daily  What changed:  when to take this     predniSONE 2.5 MG tablet  Commonly known as:  DELTASONE  Take 3 tablets by mouth daily  What changed:    · medication strength  · how much to take     traZODone 50 MG tablet  Commonly known as:  DESYREL  Take 1 tablet by mouth nightly  What changed:    · medication strength  · how much to take        CONTINUE taking these medications    albuterol 90 MCG/ACT inhaler  Commonly known as:  PROVENTIL;VENTOLIN  Inhale 2 puffs into the lungs every 6 hours as needed for Wheezing or Shortness of Breath.      clopidogrel 75 MG tablet  Commonly known as:  PLAVIX     Compressor/Nebulizer Misc  1 Device by Does not apply route as needed (DX COPD J44.9 HUGO 99) [facility]     esomeprazole 40 MG delayed release capsule  Commonly known as:  NEXIUM     Fluticasone furoate-vilanterol 200-25 MCG/INH Aepb inhaler  Commonly known as:  Breo Ellipta  USE 1 INHALATION DAILY     guaiFENesin 600 MG extended release tablet  Commonly known as:  MUCINEX  Take 1 tablet by mouth 2 times daily     lidocaine 5 %  Commonly known as:  LIDODERM     metFORMIN 500 MG tablet  Commonly known as:  GLUCOPHAGE     Nebulizer/Tubing/Mouthpiece Kit  1 kit by Does not apply route daily as needed (DX COPD J44.9 HUGO 99) [facility]        STOP taking these medications    Acapella Misc     AMPHETAMINE ER PO     aspirin 81 MG EC tablet     atorvastatin 80 MG tablet  Commonly known as:  LIPITOR     CALCIUM 1000 + D PO     CeleBREX 100 MG capsule  Generic drug:  celecoxib     colchicine 0.6 MG tablet  Commonly known as:  COLCRYS     diphenhydrAMINE 50 MG capsule  Commonly known as:  BENADRYL     donepezil 10 MG tablet  Commonly known as:  ARICEPT     DULoxetine 30 MG extended release capsule  Commonly known as: called by her home hospice nurse. Patient had been found by nurse in a confused state, experiencing visual hallucinations of a small child, chewing on and attempting to take apart her O2 tubing, speaking illogically and with a disorganized thought process, and emotionally labile. She has severe underlying COPD which is the reason she is in hospice, and significant polypharmacy burden, including, at the time of admission several controlled substances (lorazepam, norco, and dextroamphetamine). Evidently the patient made a vague statement about wishing she were dead which is what led to her being psychiatrically admit, though it should have been quite obvious she was actively delirious. On initial assessment it was clear that patient was delirious, most likely due to significant polypharmacy. We stopped multiple anticholinergic medications, reduced her prn lorazepam, discontinued the stimulant she was prescribed without clear indication, stopped donepezil as this is contraindicated in patient's with severe COPD or asthma, and increased her depakote to BID up from daily. Patient complained of significant difficulty sleeping at home and during the first few days of admission. Records indicated previous treatment with quetiapine (she has a standing diagnosis of bipolar disorder, though details of her past symptoms to confirm the diagnosis are scant). We started her on quetiapine and attempted to titrate the dose to 200 mg qhs (her previous home dose), but this resulted in orthostatic hypotension, dizziness, and presyncope. Quetiapine was reduced to 100 mg qhs, but these symptoms persisted. Ultimately, we discontinued quetiapine entirely and utilized low dose trazodone 50 mg qhs. Over the course of admission, reduction in polypharmacy improved patient's mentation. Visual hallucinations, thought disorganization, and inattentiveness all resolved. She made no further suicidal statements during admission.   She

## 2020-07-22 NOTE — BH NOTE
SW received a call from Pratima Hendricks at [facility] stating they can accept pt today for SNF. SW discussed [facility] with pt and she stated she would like to transfer there for rehab.  SOL will scheduled discharge transportation for 5:30pm.             CHANTEL Garcia

## 2020-07-22 NOTE — BH NOTE
Pt complaining of pain and inability to get comfortable to sleep. BP assessed for ability to receive pain medication. Current BP does not meet criteria. Pt up in recliner, drinking water, will continue to monitor and will reassess BP.

## 2020-07-22 NOTE — GROUP NOTE
Group Therapy Note    Date: 7/22/2020    Group Start Time: 9162  Group End Time: 1440  Group Topic: Psychoeducation    Isaura 81        Group Therapy Note    Attendees: 5    Patient's Goal: to create a leisure collage to promote identifying meaningful leisure outlets pt's may engage in various environments (indoors, outdoors, seasonal, weather permitting, etc.). To identify the benefits of engaging in leisure outlets daily, to promote a sense of purpose, stress reduction, reduced anxiety, mood improvement, and improved quality of life. Notes: Luther Johnson actively engaged in creating a leisure collage that consisted of meaningful leisure activities she may engage in, such as \"baking, cooking, celebrating holidays, and spending time with family members. \" Luther Johnson identified and voiced differences she notices in her mood, when she is not engaging in leisure outlets vs engaging in meaningful leisure outlets. Luther Johnson provided peers with positive feedback and support. Luther Johnson achieved group goal.     Status After Intervention:  Improved    Participation Level:  Active Listener and Interactive    Participation Quality: Appropriate, Attentive, Sharing and Supportive      Speech:  normal      Thought Process/Content: Linear      Affective Functioning: Congruent      Mood: euthymic      Level of consciousness:  Alert, Oriented x4 and Attentive      Response to Learning: Able to verbalize current knowledge/experience, Able to verbalize/acknowledge new learning and Progressing to goal      Endings: None Reported    Modes of Intervention: Education, Support, Socialization, Exploration, Clarifying, Problem-solving and Activity      Discipline Responsible: Psychoeducational Specialist      Signature:  [place] Michaud, [address]

## 2020-07-22 NOTE — PROGRESS NOTES
RESPIRATORY THERAPY ASSESSMENT    Name:  [address] [place] [address] Record Number:  [id number]  Age: 70 y.o. Gender: female  : 1945  Today's Date:  2020  Room:  [facility]2209-01    Assessment     Is the patient being admitted for a COPD or Asthma exacerbation? No   (If yes the patient will be seen every 4 hours for the first 24 hours and then reassessed)    Patient Admission Diagnosis      Allergies  Allergies   Allergen Reactions    Buspar [Buspirone Hcl]        Minimum Predicted Vital Capacity:               Actual Vital Capacity:                    Pulmonary History:COPD  Home Oxygen Therapy:  3 liters/min via nasal cannula  Home Respiratory Therapy:Vilanterol/Fluticasone Furoate Albuterol  Current Respiratory Therapy:  Duoneb, Symbicort  Treatment Type: MDI  Medications: Budesonide/Formoterol    Respiratory Severity Index(RSI)   Patients with orders for inhalation medications, oxygen, or any therapeutic treatment modality will be placed on Respiratory Protocol. They will be assessed with the first treatment and at least every 72 hours thereafter. The following severity scale will be used to determine frequency of treatment intervention.     Smoking History: Pulmonary Disease or Smoking History, Greater than 15 pack year = 2    Social History  Social History     Tobacco Use    Smoking status: Former Smoker     Packs/day: 3.00     Years: 47.00     Pack years: 141.00     Last attempt to quit: 2007     Years since quittin.6    Smokeless tobacco: Never Used   Substance Use Topics    Alcohol use: No     Alcohol/week: 0.0 standard drinks    Drug use: No       Recent Surgical History: None = 0  Past Surgical History  Past Surgical History:   Procedure Laterality Date    APPENDECTOMY      taken out with gallbladder    CARDIAC PACEMAKER PLACEMENT      CARDIAC SURGERY      pacemaker 2003    CARDIAC SURGERY      STENT    CHOLECYSTECTOMY      COLONOSCOPY      DIAGNOSTIC CARDIAC CATH LAB PROCEDURE      HYSTERECTOMY  1975    partial    OTHER SURGICAL HISTORY  5/28/13    quadroscopy    PACEMAKER INSERTION      PACEMAKER PLACEMENT      TONSILLECTOMY      TUBAL LIGATION      VASCULAR SURGERY      stent placement       Level of Consciousness: Alert, Oriented, and Cooperative = 0    Level of Activity: Walking with assistance = 1    Respiratory Pattern: Regular Pattern; RR 8-20 = 0    Breath Sounds: Diminshed bilaterally and/or crackles = 2    Sputum   ,  , Sputum How Obtained: Spontaneous cough  Cough: Strong, spontaneous, non-productive = 0    Vital Signs   BP (!) 108/59   Pulse 74   Temp 97 °F (36.1 °C) (Oral)   Resp 16   Ht 5' 2\" (1.575 m)   Wt 192 lb 8 oz (87.3 kg)   SpO2 98%   BMI 35.21 kg/m²   SPO2 (COPD values may differ): 88-89% on room air or greater than 92% on FiO2 28- 35% = 2    Peak Flow (asthma only): not applicable = 0    RSI: 7-8 = BID and Q4HPRN (every four hours as needed) for dyspnea        Plan       Goals: medication delivery, mobilize retained secretions, volume expansion and improve oxygenation    Patient/caregiver was educated on the proper method of use for Respiratory Care Devices:  Yes      Level of patient/caregiver understanding able to:   ? Verbalize understanding   ? Demonstrate understanding       ? Teach back        ? Needs reinforcement       ? No available caregiver               ? Other:     Response to education:  Excellent     Is patient being placed on Home Treatment Regimen? No     Does the patient have everything they need prior to discharge? NA     Comments: pt assessed & chart reviewed    Plan of Care: maintain current regimen    Electronically signed by Cata Velasco RCP on 7/21/2020 at 9:21 PM    Respiratory Protocol Guidelines     1. Assessment and treatment by Respiratory Therapy will be initiated for medication and therapeutic interventions upon initiation of aerosolized medication.   2. Physician will be contacted for respiratory rate (RR) greater than 35 breaths per minute. Therapy will be held for heart rate (HR) greater than 140 beats per minute, pending direction from physician. 3. Bronchodilators will be administered via Metered Dose Inhaler (MDI) with spacer when the following criteria are met:  a. Alert and cooperative     b. HR < 140 bpm  c. RR < 30 bpm                d. Can demonstrate a 2-3 second inspiratory hold  4. Bronchodilators will be administered via Hand Held Nebulizer ANTOINETTE [facility]) to patients when ANY of the following criteria are met  a. Incognizant or uncooperative          b. Patients treated with HHN at Home        c. Unable to demonstrate proper use of MDI with spacer     d. RR > 30 bpm   5. Bronchodilators will be delivered via Metered Dose Inhaler (MDI), HHN, Aerogen to intubated patients on mechanical ventilation. 6. Inhalation medication orders will be delivered and/or substituted as outlined below. Aerosolized Medications Ordering and Administration Guidelines:    1. All Medications will be ordered by a physician, and their frequency and/or modality will be adjusted as defined by the patients Respiratory Severity Index (RSI) score. 2. If the patient does not have documented COPD, consider discontinuing anticholinergics when RSI is less than 9.  3. If the bronchospasm worsens (increased RSI), then the bronchodilator frequency can be increased to a maximum of every 4 hours. If greater than every 4 hours is required, the physician will be contacted. 4. If the bronchospasm improves, the frequency of the bronchodilator can be decreased, based on the patient's RSI, but not less than home treatment regimen frequency. 5. Bronchodilator(s) will be discontinued if patient has a RSI less than 9 and has received no scheduled or as needed treatment for 72  Hrs. Patients Ordered on a Mucolytic Agent:    1. Must always be administered with a bronchodilator.     2. Discontinue if patient experiences worsened bronchospasm, or secretions have lessened to the point that the patient is able to clear them with a cough. Anti-inflammatory and Combination Medications:    1. If the patient lacks prior history of lung disease, is not using inhaled anti-inflammatory medication at home, and lacks wheezing by examination or by history for at least 24 hours, contact physician for possible discontinuation.

## 2020-07-22 NOTE — PLAN OF CARE
Pt is up ad darci in her room. She has walked into the flores, but did not come out to the common area and socialize. She is brighter and pleasant with the staff. She states depression is improving and denies suicidal ideations. She denies HI/AVH. Pt has not been noted to be RTIS.02 in place per order.

## 2020-07-22 NOTE — BH NOTE
[facility]  Discharge Note    Pt discharged with followings belongings:   Dentures: Uppers  Vision - Corrective Lenses: None(glasses not with)  Jewelry: None  Body Piercings Removed: N/A  Clothing: (black underwear)  Were All Patient Medications Collected?: Not Applicable  Other Valuables: Other (Comment)(see valuables list in soft chart)   Valuables sent home with patient. Valuables retrieved from safe, Security envelope number:  NA and returned to patient. Patient education on aftercare instructions: yes  Information faxed to Quirino Huff at [place] by nursing. Patient verbalize understanding of AVS:  yes.     Status EXAM upon discharge:  Status and Exam  Normal: No  Facial Expression: Worried, Sad  Affect: Appropriate  Level of Consciousness: Alert  Mood:Normal: No  Mood: Anxious  Motor Activity:Normal: No  Motor Activity: Decreased  Interview Behavior: Cooperative  Preception: [place] to Person, Peggyann Dynes to Time, [place] to Place, [place] to Situation  Attention:Normal: No  Attention: Distractible  Thought Processes: Other(See comment)(linear)  Thought Content:Normal: No  Thought Content: Preoccupations  Hallucinations: None(denies, no RTIS noted)  Delusions: No  Memory:Normal: Yes  Memory: Poor Recent  Insight and Judgment: Yes  Insight and Judgment: Poor Judgment, Poor Insight  Present Suicidal Ideation: No(denies)  Present Homicidal Ideation: No(denies)      Metabolic Screening:    Lab Results   Component Value Date    LABA1C 6.2 07/11/2020       Lab Results   Component Value Date    CHOL 208 (H) 07/11/2020    CHOL 174 07/19/2016     Lab Results   Component Value Date    TRIG 169 (H) 07/11/2020    TRIG 135 07/19/2016     Lab Results   Component Value Date    HDL 35 (L) 07/11/2020    HDL 50 07/19/2016     No components found for: [facility]  Lab Results   Component Value Date    LABVLDL 34 07/11/2020    LABVLDL 27 07/19/2016     Bridge Appointment completed: Reviewed Discharge Instructions with patient. Patient verbalizes understanding and agreement with the discharge plan using the teachback method.      Referral for Outpatient Tobacco Cessation Counseling, upon discharge (ricky X if applicable and completed):    ( )  Hospital staff assisted patient to call Quit Line or faxed referral                                   during hospitalization                  ( )  Recognizing danger situations (included triggers and roadblocks), if not completed on admission                    ( )  Coping skills (new ways to manage stress, exercise, relaxation techniques, changing routine, distraction), if not completed on admission                                                           ( )  Basic information about quitting (benefits of quitting, techniques in how to quit, available resources, if not completed on admission  ( ) Referral for counseling faxed to [facility]   ( ) Patient refused referral  ( ) Patient refused counseling  ( ) Patient refused smoking cessation medication upon discharge    Vaccinations (ricky X if applicable and completed):  ( ) Patient states already received influenza vaccine elsewhere  ( ) Patient received influenza vaccine during this hospitalization  ( x ) Patient refused influenza vaccine at this time    Malena Vasquez RN

## 2020-07-22 NOTE — GROUP NOTE
Group Therapy Note    Date: 7/22/2020    Group Start Time: 7584  Group End Time: 1100  Group Topic: [id number] [facility]        Group Therapy Note    Attendees: 5    Patient's Goal: to engage in Mood Elevation intervention to improve mood, discuss music as a coping skills, and improve overall quality of life. Notes:  Linda Cosme was late to group. Pt reported feeling \"not too good\" at the beginning of group and her goal was to feel \"less pain. \" Linda Cosme actively listened to the music utilized and softly sang along at times. Pt reported feeling \"lifted up\" at the conclusion and shared she had met her goal. Linda Cosme actively participated in processing discussion. Status After Intervention:  Improved    Participation Level:  Active Listener and Interactive    Participation Quality: Appropriate, Attentive and Sharing      Speech:  normal      Thought Process/Content: Linear      Affective Functioning: Constricted/Restricted      Mood: anxious      Level of consciousness:  Alert and Attentive      Response to Learning: Able to verbalize current knowledge/experience, Able to verbalize/acknowledge new learning and Progressing to goal      Endings: None Reported    Modes of Intervention: Education, Support, Socialization, Problem-solving, Activity and Media      Discipline Responsible: Psychoeducational Specialist      Signature:  ANDREA Welch

## 2021-02-12 ENCOUNTER — HOSPITAL ENCOUNTER (EMERGENCY)
Age: 76
Discharge: ANOTHER HOSPITAL | End: 2021-02-13
Attending: EMERGENCY MEDICINE
Payer: MEDICARE

## 2021-02-12 ENCOUNTER — APPOINTMENT (OUTPATIENT)
Dept: CT IMAGING | Age: 76
End: 2021-02-12
Payer: MEDICARE

## 2021-02-12 ENCOUNTER — APPOINTMENT (OUTPATIENT)
Dept: GENERAL RADIOLOGY | Age: 76
End: 2021-02-12
Payer: MEDICARE

## 2021-02-12 DIAGNOSIS — J44.9 CHRONIC OBSTRUCTIVE PULMONARY DISEASE, UNSPECIFIED COPD TYPE (HCC): ICD-10-CM

## 2021-02-12 DIAGNOSIS — I10 ESSENTIAL HYPERTENSION: ICD-10-CM

## 2021-02-12 DIAGNOSIS — I63.9 ISCHEMIC STROKE (HCC): Primary | ICD-10-CM

## 2021-02-12 LAB
A/G RATIO: 1.6 (ref 1.1–2.2)
ACETAMINOPHEN LEVEL: <5 UG/ML (ref 10–30)
ALBUMIN SERPL-MCNC: 4.7 G/DL (ref 3.4–5)
ALP BLD-CCNC: 91 U/L (ref 40–129)
ALT SERPL-CCNC: 12 U/L (ref 10–40)
AMPHETAMINE SCREEN, URINE: ABNORMAL
ANION GAP SERPL CALCULATED.3IONS-SCNC: 15 MMOL/L (ref 3–16)
APTT: 27.9 SEC (ref 24.2–36.2)
AST SERPL-CCNC: 21 U/L (ref 15–37)
BARBITURATE SCREEN URINE: ABNORMAL
BASE EXCESS VENOUS: 0.4 MMOL/L (ref -3–3)
BASOPHILS ABSOLUTE: 0.1 K/UL (ref 0–0.2)
BASOPHILS RELATIVE PERCENT: 1 %
BENZODIAZEPINE SCREEN, URINE: ABNORMAL
BILIRUB SERPL-MCNC: 0.4 MG/DL (ref 0–1)
BILIRUBIN URINE: ABNORMAL
BLOOD, URINE: ABNORMAL
BUN BLDV-MCNC: 18 MG/DL (ref 7–20)
CALCIUM SERPL-MCNC: 10.2 MG/DL (ref 8.3–10.6)
CANNABINOID SCREEN URINE: ABNORMAL
CARBOXYHEMOGLOBIN: 1.7 % (ref 0–1.5)
CHLORIDE BLD-SCNC: 99 MMOL/L (ref 99–110)
CLARITY: CLEAR
CO2: 26 MMOL/L (ref 21–32)
COCAINE METABOLITE SCREEN URINE: ABNORMAL
COLOR: YELLOW
CREAT SERPL-MCNC: 1 MG/DL (ref 0.6–1.2)
EOSINOPHILS ABSOLUTE: 0 K/UL (ref 0–0.6)
EOSINOPHILS RELATIVE PERCENT: 0.2 %
ETHANOL: NORMAL MG/DL (ref 0–0.08)
GFR AFRICAN AMERICAN: >60
GFR NON-AFRICAN AMERICAN: 54
GLOBULIN: 3 G/DL
GLUCOSE BLD-MCNC: 153 MG/DL (ref 70–99)
GLUCOSE URINE: NEGATIVE MG/DL
HCO3 VENOUS: 24.9 MMOL/L (ref 23–29)
HCT VFR BLD CALC: 44.3 % (ref 36–48)
HEMOGLOBIN: 14.6 G/DL (ref 12–16)
HYALINE CASTS: ABNORMAL /LPF (ref 0–2)
INR BLD: 1.14 (ref 0.86–1.14)
KETONES, URINE: 40 MG/DL
LACTIC ACID: 1.2 MMOL/L (ref 0.4–2)
LEUKOCYTE ESTERASE, URINE: NEGATIVE
LYMPHOCYTES ABSOLUTE: 2.3 K/UL (ref 1–5.1)
LYMPHOCYTES RELATIVE PERCENT: 16.9 %
Lab: ABNORMAL
MCH RBC QN AUTO: 28.7 PG (ref 26–34)
MCHC RBC AUTO-ENTMCNC: 32.9 G/DL (ref 31–36)
MCV RBC AUTO: 87.4 FL (ref 80–100)
METHADONE SCREEN, URINE: ABNORMAL
METHEMOGLOBIN VENOUS: 0.3 %
MICROSCOPIC EXAMINATION: YES
MONOCYTES ABSOLUTE: 0.9 K/UL (ref 0–1.3)
MONOCYTES RELATIVE PERCENT: 7.1 %
MUCUS: ABNORMAL /LPF
NEUTROPHILS ABSOLUTE: 10 K/UL (ref 1.7–7.7)
NEUTROPHILS RELATIVE PERCENT: 74.8 %
NITRITE, URINE: NEGATIVE
O2 CONTENT, VEN: 22 VOL %
O2 SAT, VEN: 99 %
O2 THERAPY: ABNORMAL
OPIATE SCREEN URINE: POSITIVE
OXYCODONE URINE: ABNORMAL
PCO2, VEN: 39.9 MMHG (ref 40–50)
PDW BLD-RTO: 14.3 % (ref 12.4–15.4)
PH UA: 6
PH UA: 6 (ref 5–8)
PH VENOUS: 7.41 (ref 7.35–7.45)
PHENCYCLIDINE SCREEN URINE: ABNORMAL
PLATELET # BLD: 343 K/UL (ref 135–450)
PMV BLD AUTO: 7.6 FL (ref 5–10.5)
PO2, VEN: 133.9 MMHG (ref 25–40)
POTASSIUM SERPL-SCNC: 4.2 MMOL/L (ref 3.5–5.1)
PRO-BNP: 693 PG/ML (ref 0–449)
PROPOXYPHENE SCREEN: ABNORMAL
PROTEIN UA: 30 MG/DL
PROTHROMBIN TIME: 13.2 SEC (ref 10–13.2)
RBC # BLD: 5.07 M/UL (ref 4–5.2)
RBC UA: ABNORMAL /HPF (ref 0–4)
SALICYLATE, SERUM: <0.3 MG/DL (ref 15–30)
SARS-COV-2, NAAT: NOT DETECTED
SODIUM BLD-SCNC: 140 MMOL/L (ref 136–145)
SPECIFIC GRAVITY UA: 1.01 (ref 1–1.03)
TCO2 CALC VENOUS: 26 MMOL/L
TOTAL PROTEIN: 7.7 G/DL (ref 6.4–8.2)
TROPONIN: <0.01 NG/ML
TSH REFLEX: 1.45 UIU/ML (ref 0.27–4.2)
URINE REFLEX TO CULTURE: ABNORMAL
URINE TYPE: ABNORMAL
UROBILINOGEN, URINE: 0.2 E.U./DL
WBC # BLD: 13.3 K/UL (ref 4–11)
WBC UA: ABNORMAL /HPF (ref 0–5)

## 2021-02-12 PROCEDURE — 93005 ELECTROCARDIOGRAM TRACING: CPT | Performed by: EMERGENCY MEDICINE

## 2021-02-12 PROCEDURE — 80143 DRUG ASSAY ACETAMINOPHEN: CPT

## 2021-02-12 PROCEDURE — 84443 ASSAY THYROID STIM HORMONE: CPT

## 2021-02-12 PROCEDURE — 70498 CT ANGIOGRAPHY NECK: CPT

## 2021-02-12 PROCEDURE — 70450 CT HEAD/BRAIN W/O DYE: CPT

## 2021-02-12 PROCEDURE — 99285 EMERGENCY DEPT VISIT HI MDM: CPT

## 2021-02-12 PROCEDURE — 80307 DRUG TEST PRSMV CHEM ANLYZR: CPT

## 2021-02-12 PROCEDURE — 83880 ASSAY OF NATRIURETIC PEPTIDE: CPT

## 2021-02-12 PROCEDURE — 6360000004 HC RX CONTRAST MEDICATION: Performed by: EMERGENCY MEDICINE

## 2021-02-12 PROCEDURE — 99291 CRITICAL CARE FIRST HOUR: CPT

## 2021-02-12 PROCEDURE — 80053 COMPREHEN METABOLIC PANEL: CPT

## 2021-02-12 PROCEDURE — 82077 ASSAY SPEC XCP UR&BREATH IA: CPT

## 2021-02-12 PROCEDURE — 84484 ASSAY OF TROPONIN QUANT: CPT

## 2021-02-12 PROCEDURE — 85610 PROTHROMBIN TIME: CPT

## 2021-02-12 PROCEDURE — 83605 ASSAY OF LACTIC ACID: CPT

## 2021-02-12 PROCEDURE — 80179 DRUG ASSAY SALICYLATE: CPT

## 2021-02-12 PROCEDURE — 85025 COMPLETE CBC W/AUTO DIFF WBC: CPT

## 2021-02-12 PROCEDURE — 81001 URINALYSIS AUTO W/SCOPE: CPT

## 2021-02-12 PROCEDURE — 71045 X-RAY EXAM CHEST 1 VIEW: CPT

## 2021-02-12 PROCEDURE — 6370000000 HC RX 637 (ALT 250 FOR IP): Performed by: EMERGENCY MEDICINE

## 2021-02-12 PROCEDURE — 85730 THROMBOPLASTIN TIME PARTIAL: CPT

## 2021-02-12 PROCEDURE — 6360000002 HC RX W HCPCS: Performed by: EMERGENCY MEDICINE

## 2021-02-12 PROCEDURE — 96374 THER/PROPH/DIAG INJ IV PUSH: CPT

## 2021-02-12 PROCEDURE — 82803 BLOOD GASES ANY COMBINATION: CPT

## 2021-02-12 RX ORDER — DEXAMETHASONE SODIUM PHOSPHATE 10 MG/ML
10 INJECTION, SOLUTION INTRAMUSCULAR; INTRAVENOUS ONCE
Status: COMPLETED | OUTPATIENT
Start: 2021-02-12 | End: 2021-02-12

## 2021-02-12 RX ORDER — IPRATROPIUM BROMIDE AND ALBUTEROL SULFATE 2.5; .5 MG/3ML; MG/3ML
3 SOLUTION RESPIRATORY (INHALATION) ONCE
Status: COMPLETED | OUTPATIENT
Start: 2021-02-12 | End: 2021-02-12

## 2021-02-12 RX ORDER — IPRATROPIUM BROMIDE AND ALBUTEROL SULFATE 2.5; .5 MG/3ML; MG/3ML
1 SOLUTION RESPIRATORY (INHALATION) ONCE
Status: COMPLETED | OUTPATIENT
Start: 2021-02-12 | End: 2021-02-13

## 2021-02-12 RX ADMIN — IOPAMIDOL 85 ML: 755 INJECTION, SOLUTION INTRAVENOUS at 20:56

## 2021-02-12 RX ADMIN — IPRATROPIUM BROMIDE AND ALBUTEROL SULFATE 3 AMPULE: .5; 3 SOLUTION RESPIRATORY (INHALATION) at 21:24

## 2021-02-12 RX ADMIN — DEXAMETHASONE SODIUM PHOSPHATE 10 MG: 10 INJECTION INTRAMUSCULAR; INTRAVENOUS at 21:19

## 2021-02-13 ENCOUNTER — HOSPITAL ENCOUNTER (INPATIENT)
Age: 76
LOS: 4 days | Discharge: NURSING, REHAB OR LONG-TERM CARE | DRG: 065 | End: 2021-02-17
Attending: INTERNAL MEDICINE | Admitting: INTERNAL MEDICINE
Payer: MEDICARE

## 2021-02-13 ENCOUNTER — APPOINTMENT (OUTPATIENT)
Dept: CT IMAGING | Age: 76
DRG: 065 | End: 2021-02-13
Attending: INTERNAL MEDICINE
Payer: MEDICARE

## 2021-02-13 VITALS
DIASTOLIC BLOOD PRESSURE: 89 MMHG | BODY MASS INDEX: 34.96 KG/M2 | RESPIRATION RATE: 14 BRPM | SYSTOLIC BLOOD PRESSURE: 187 MMHG | WEIGHT: 190 LBS | HEART RATE: 114 BPM | HEIGHT: 62 IN | OXYGEN SATURATION: 100 % | TEMPERATURE: 99.1 F

## 2021-02-13 DIAGNOSIS — Z95.0 PACEMAKER: ICD-10-CM

## 2021-02-13 DIAGNOSIS — G89.29 CHRONIC BILATERAL LOW BACK PAIN WITHOUT SCIATICA: ICD-10-CM

## 2021-02-13 DIAGNOSIS — M54.50 CHRONIC BILATERAL LOW BACK PAIN WITHOUT SCIATICA: ICD-10-CM

## 2021-02-13 DIAGNOSIS — I63.9 ACUTE ISCHEMIC STROKE (HCC): Primary | ICD-10-CM

## 2021-02-13 LAB
ANION GAP SERPL CALCULATED.3IONS-SCNC: 15 MMOL/L (ref 3–16)
BASOPHILS ABSOLUTE: 0 K/UL (ref 0–0.2)
BASOPHILS RELATIVE PERCENT: 0.2 %
BUN BLDV-MCNC: 21 MG/DL (ref 7–20)
CALCIUM SERPL-MCNC: 9.7 MG/DL (ref 8.3–10.6)
CHLORIDE BLD-SCNC: 98 MMOL/L (ref 99–110)
CO2: 27 MMOL/L (ref 21–32)
CREAT SERPL-MCNC: 1.2 MG/DL (ref 0.6–1.2)
EKG ATRIAL RATE: 109 BPM
EKG DIAGNOSIS: NORMAL
EKG P AXIS: 75 DEGREES
EKG P-R INTERVAL: 180 MS
EKG Q-T INTERVAL: 330 MS
EKG QRS DURATION: 78 MS
EKG QTC CALCULATION (BAZETT): 444 MS
EKG R AXIS: 19 DEGREES
EKG T AXIS: 66 DEGREES
EKG VENTRICULAR RATE: 109 BPM
EOSINOPHILS ABSOLUTE: 0 K/UL (ref 0–0.6)
EOSINOPHILS RELATIVE PERCENT: 0 %
GFR AFRICAN AMERICAN: 53
GFR NON-AFRICAN AMERICAN: 44
GLUCOSE BLD-MCNC: 172 MG/DL (ref 70–99)
GLUCOSE BLD-MCNC: 174 MG/DL (ref 70–99)
GLUCOSE BLD-MCNC: 188 MG/DL (ref 70–99)
GLUCOSE BLD-MCNC: 210 MG/DL (ref 70–99)
GLUCOSE BLD-MCNC: 250 MG/DL (ref 70–99)
HCT VFR BLD CALC: 43.5 % (ref 36–48)
HEMOGLOBIN: 14 G/DL (ref 12–16)
LV EF: 68 %
LVEF MODALITY: NORMAL
LYMPHOCYTES ABSOLUTE: 0.7 K/UL (ref 1–5.1)
LYMPHOCYTES RELATIVE PERCENT: 7.3 %
MAGNESIUM: 1.7 MG/DL (ref 1.8–2.4)
MCH RBC QN AUTO: 28.5 PG (ref 26–34)
MCHC RBC AUTO-ENTMCNC: 32.1 G/DL (ref 31–36)
MCV RBC AUTO: 88.6 FL (ref 80–100)
MONOCYTES ABSOLUTE: 0.1 K/UL (ref 0–1.3)
MONOCYTES RELATIVE PERCENT: 1.4 %
NEUTROPHILS ABSOLUTE: 9.2 K/UL (ref 1.7–7.7)
NEUTROPHILS RELATIVE PERCENT: 91.1 %
PDW BLD-RTO: 14.8 % (ref 12.4–15.4)
PERFORMED ON: ABNORMAL
PLATELET # BLD: 313 K/UL (ref 135–450)
PMV BLD AUTO: 7.6 FL (ref 5–10.5)
POTASSIUM SERPL-SCNC: 3.8 MMOL/L (ref 3.5–5.1)
RBC # BLD: 4.91 M/UL (ref 4–5.2)
SODIUM BLD-SCNC: 140 MMOL/L (ref 136–145)
VALPROIC ACID LEVEL: <2.8 UG/ML (ref 50–100)
WBC # BLD: 10.1 K/UL (ref 4–11)

## 2021-02-13 PROCEDURE — 93010 ELECTROCARDIOGRAM REPORT: CPT | Performed by: INTERNAL MEDICINE

## 2021-02-13 PROCEDURE — 6370000000 HC RX 637 (ALT 250 FOR IP): Performed by: EMERGENCY MEDICINE

## 2021-02-13 PROCEDURE — 99223 1ST HOSP IP/OBS HIGH 75: CPT | Performed by: INTERNAL MEDICINE

## 2021-02-13 PROCEDURE — 6370000000 HC RX 637 (ALT 250 FOR IP): Performed by: STUDENT IN AN ORGANIZED HEALTH CARE EDUCATION/TRAINING PROGRAM

## 2021-02-13 PROCEDURE — 94640 AIRWAY INHALATION TREATMENT: CPT

## 2021-02-13 PROCEDURE — 94761 N-INVAS EAR/PLS OXIMETRY MLT: CPT

## 2021-02-13 PROCEDURE — 2580000003 HC RX 258: Performed by: STUDENT IN AN ORGANIZED HEALTH CARE EDUCATION/TRAINING PROGRAM

## 2021-02-13 PROCEDURE — 80048 BASIC METABOLIC PNL TOTAL CA: CPT

## 2021-02-13 PROCEDURE — 70450 CT HEAD/BRAIN W/O DYE: CPT

## 2021-02-13 PROCEDURE — 83735 ASSAY OF MAGNESIUM: CPT

## 2021-02-13 PROCEDURE — C8929 TTE W OR WO FOL WCON,DOPPLER: HCPCS

## 2021-02-13 PROCEDURE — 94664 DEMO&/EVAL PT USE INHALER: CPT

## 2021-02-13 PROCEDURE — 1200000000 HC SEMI PRIVATE

## 2021-02-13 PROCEDURE — 80164 ASSAY DIPROPYLACETIC ACD TOT: CPT

## 2021-02-13 PROCEDURE — 36415 COLL VENOUS BLD VENIPUNCTURE: CPT

## 2021-02-13 PROCEDURE — 6360000002 HC RX W HCPCS: Performed by: STUDENT IN AN ORGANIZED HEALTH CARE EDUCATION/TRAINING PROGRAM

## 2021-02-13 PROCEDURE — 85025 COMPLETE CBC W/AUTO DIFF WBC: CPT

## 2021-02-13 PROCEDURE — 92610 EVALUATE SWALLOWING FUNCTION: CPT

## 2021-02-13 PROCEDURE — 2700000000 HC OXYGEN THERAPY PER DAY

## 2021-02-13 PROCEDURE — 6370000000 HC RX 637 (ALT 250 FOR IP): Performed by: INTERNAL MEDICINE

## 2021-02-13 RX ORDER — LISINOPRIL 40 MG/1
40 TABLET ORAL DAILY
Status: ON HOLD | COMMUNITY
End: 2021-02-16 | Stop reason: HOSPADM

## 2021-02-13 RX ORDER — LORAZEPAM 0.5 MG/1
0.5 TABLET ORAL 3 TIMES DAILY PRN
COMMUNITY

## 2021-02-13 RX ORDER — POTASSIUM CHLORIDE 750 MG/1
10 TABLET, FILM COATED, EXTENDED RELEASE ORAL DAILY
COMMUNITY

## 2021-02-13 RX ORDER — HEPARIN SODIUM 5000 [USP'U]/ML
5000 INJECTION, SOLUTION INTRAVENOUS; SUBCUTANEOUS EVERY 8 HOURS SCHEDULED
Status: DISCONTINUED | OUTPATIENT
Start: 2021-02-13 | End: 2021-02-17 | Stop reason: HOSPADM

## 2021-02-13 RX ORDER — CLOPIDOGREL BISULFATE 75 MG/1
75 TABLET ORAL DAILY
Status: DISCONTINUED | OUTPATIENT
Start: 2021-02-13 | End: 2021-02-13

## 2021-02-13 RX ORDER — POLYETHYLENE GLYCOL 3350 17 G/17G
17 POWDER, FOR SOLUTION ORAL DAILY PRN
Status: DISCONTINUED | OUTPATIENT
Start: 2021-02-13 | End: 2021-02-17 | Stop reason: HOSPADM

## 2021-02-13 RX ORDER — AMLODIPINE BESYLATE 5 MG/1
5 TABLET ORAL NIGHTLY
Status: DISCONTINUED | OUTPATIENT
Start: 2021-02-13 | End: 2021-02-13

## 2021-02-13 RX ORDER — ATORVASTATIN CALCIUM 40 MG/1
40 TABLET, FILM COATED ORAL NIGHTLY
Status: DISCONTINUED | OUTPATIENT
Start: 2021-02-13 | End: 2021-02-13

## 2021-02-13 RX ORDER — TRAZODONE HYDROCHLORIDE 50 MG/1
50 TABLET ORAL NIGHTLY
Status: DISCONTINUED | OUTPATIENT
Start: 2021-02-13 | End: 2021-02-17 | Stop reason: HOSPADM

## 2021-02-13 RX ORDER — SODIUM CHLORIDE 0.9 % (FLUSH) 0.9 %
10 SYRINGE (ML) INJECTION PRN
Status: DISCONTINUED | OUTPATIENT
Start: 2021-02-13 | End: 2021-02-17 | Stop reason: HOSPADM

## 2021-02-13 RX ORDER — CLOPIDOGREL BISULFATE 75 MG/1
75 TABLET ORAL DAILY
Status: DISCONTINUED | OUTPATIENT
Start: 2021-02-14 | End: 2021-02-17 | Stop reason: HOSPADM

## 2021-02-13 RX ORDER — ROPINIROLE 0.5 MG/1
0.5 TABLET, FILM COATED ORAL NIGHTLY
COMMUNITY

## 2021-02-13 RX ORDER — PREDNISONE 1 MG/1
7.5 TABLET ORAL DAILY
Status: DISCONTINUED | OUTPATIENT
Start: 2021-02-14 | End: 2021-02-17 | Stop reason: HOSPADM

## 2021-02-13 RX ORDER — FUROSEMIDE 20 MG/1
20 TABLET ORAL DAILY PRN
Status: DISCONTINUED | OUTPATIENT
Start: 2021-02-13 | End: 2021-02-13

## 2021-02-13 RX ORDER — ASPIRIN 81 MG/1
81 TABLET, CHEWABLE ORAL NIGHTLY
COMMUNITY

## 2021-02-13 RX ORDER — DIVALPROEX SODIUM 250 MG/1
250 TABLET, DELAYED RELEASE ORAL 2 TIMES DAILY
Status: DISCONTINUED | OUTPATIENT
Start: 2021-02-13 | End: 2021-02-13

## 2021-02-13 RX ORDER — ASPIRIN 81 MG/1
81 TABLET, CHEWABLE ORAL DAILY
Status: DISCONTINUED | OUTPATIENT
Start: 2021-02-14 | End: 2021-02-17 | Stop reason: HOSPADM

## 2021-02-13 RX ORDER — DEXTROSE MONOHYDRATE 25 G/50ML
12.5 INJECTION, SOLUTION INTRAVENOUS PRN
Status: DISCONTINUED | OUTPATIENT
Start: 2021-02-13 | End: 2021-02-17 | Stop reason: HOSPADM

## 2021-02-13 RX ORDER — LISINOPRIL 40 MG/1
40 TABLET ORAL DAILY
Status: DISCONTINUED | OUTPATIENT
Start: 2021-02-13 | End: 2021-02-17 | Stop reason: HOSPADM

## 2021-02-13 RX ORDER — LANOLIN ALCOHOL/MO/W.PET/CERES
1000 CREAM (GRAM) TOPICAL DAILY
COMMUNITY

## 2021-02-13 RX ORDER — PREDNISONE 1 MG/1
7.5 TABLET ORAL DAILY
Status: DISCONTINUED | OUTPATIENT
Start: 2021-02-13 | End: 2021-02-13

## 2021-02-13 RX ORDER — FUROSEMIDE 40 MG/1
40 TABLET ORAL DAILY
COMMUNITY

## 2021-02-13 RX ORDER — LEVOTHYROXINE SODIUM 0.12 MG/1
125 TABLET ORAL DAILY
Status: DISCONTINUED | OUTPATIENT
Start: 2021-02-13 | End: 2021-02-13

## 2021-02-13 RX ORDER — INSULIN LISPRO 100 [IU]/ML
0-6 INJECTION, SOLUTION INTRAVENOUS; SUBCUTANEOUS NIGHTLY
Status: DISCONTINUED | OUTPATIENT
Start: 2021-02-13 | End: 2021-02-17 | Stop reason: HOSPADM

## 2021-02-13 RX ORDER — MAGNESIUM SULFATE IN WATER 40 MG/ML
2000 INJECTION, SOLUTION INTRAVENOUS ONCE
Status: COMPLETED | OUTPATIENT
Start: 2021-02-13 | End: 2021-02-13

## 2021-02-13 RX ORDER — ALBUTEROL SULFATE 2.5 MG/3ML
2.5 SOLUTION RESPIRATORY (INHALATION) EVERY 6 HOURS PRN
Status: DISCONTINUED | OUTPATIENT
Start: 2021-02-13 | End: 2021-02-17 | Stop reason: HOSPADM

## 2021-02-13 RX ORDER — HYDROCODONE BITARTRATE AND ACETAMINOPHEN 10; 325 MG/1; MG/1
1 TABLET ORAL EVERY 6 HOURS PRN
Status: ON HOLD | COMMUNITY
End: 2021-02-16 | Stop reason: HOSPADM

## 2021-02-13 RX ORDER — ATORVASTATIN CALCIUM 80 MG/1
80 TABLET, FILM COATED ORAL DAILY
COMMUNITY

## 2021-02-13 RX ORDER — ACETAMINOPHEN 325 MG/1
650 TABLET ORAL EVERY 6 HOURS PRN
Status: DISCONTINUED | OUTPATIENT
Start: 2021-02-13 | End: 2021-02-17 | Stop reason: HOSPADM

## 2021-02-13 RX ORDER — DIVALPROEX SODIUM 250 MG/1
250 TABLET, DELAYED RELEASE ORAL 2 TIMES DAILY
Status: DISCONTINUED | OUTPATIENT
Start: 2021-02-13 | End: 2021-02-13 | Stop reason: DRUGHIGH

## 2021-02-13 RX ORDER — INSULIN LISPRO 100 [IU]/ML
0-12 INJECTION, SOLUTION INTRAVENOUS; SUBCUTANEOUS
Status: DISCONTINUED | OUTPATIENT
Start: 2021-02-13 | End: 2021-02-17 | Stop reason: HOSPADM

## 2021-02-13 RX ORDER — NICOTINE POLACRILEX 4 MG
15 LOZENGE BUCCAL PRN
Status: DISCONTINUED | OUTPATIENT
Start: 2021-02-13 | End: 2021-02-17 | Stop reason: HOSPADM

## 2021-02-13 RX ORDER — ATORVASTATIN CALCIUM 40 MG/1
40 TABLET, FILM COATED ORAL NIGHTLY
Status: DISCONTINUED | OUTPATIENT
Start: 2021-02-13 | End: 2021-02-14

## 2021-02-13 RX ORDER — ASPIRIN 81 MG/1
81 TABLET, CHEWABLE ORAL DAILY
Status: DISCONTINUED | OUTPATIENT
Start: 2021-02-13 | End: 2021-02-13

## 2021-02-13 RX ORDER — SODIUM CHLORIDE 0.9 % (FLUSH) 0.9 %
10 SYRINGE (ML) INJECTION EVERY 12 HOURS SCHEDULED
Status: DISCONTINUED | OUTPATIENT
Start: 2021-02-13 | End: 2021-02-17 | Stop reason: HOSPADM

## 2021-02-13 RX ORDER — AMOXICILLIN 250 MG
1 CAPSULE ORAL 2 TIMES DAILY
COMMUNITY

## 2021-02-13 RX ORDER — PANTOPRAZOLE SODIUM 40 MG/1
40 TABLET, DELAYED RELEASE ORAL
Status: DISCONTINUED | OUTPATIENT
Start: 2021-02-14 | End: 2021-02-17 | Stop reason: HOSPADM

## 2021-02-13 RX ORDER — TRAZODONE HYDROCHLORIDE 50 MG/1
50 TABLET ORAL NIGHTLY
Status: DISCONTINUED | OUTPATIENT
Start: 2021-02-13 | End: 2021-02-13

## 2021-02-13 RX ORDER — ONDANSETRON 2 MG/ML
4 INJECTION INTRAMUSCULAR; INTRAVENOUS EVERY 6 HOURS PRN
Status: DISCONTINUED | OUTPATIENT
Start: 2021-02-13 | End: 2021-02-17 | Stop reason: HOSPADM

## 2021-02-13 RX ORDER — LABETALOL HYDROCHLORIDE 5 MG/ML
10 INJECTION, SOLUTION INTRAVENOUS EVERY 4 HOURS PRN
Status: DISCONTINUED | OUTPATIENT
Start: 2021-02-13 | End: 2021-02-17 | Stop reason: HOSPADM

## 2021-02-13 RX ORDER — LEVOTHYROXINE SODIUM 0.12 MG/1
125 TABLET ORAL DAILY
Status: DISCONTINUED | OUTPATIENT
Start: 2021-02-14 | End: 2021-02-17 | Stop reason: HOSPADM

## 2021-02-13 RX ORDER — PANTOPRAZOLE SODIUM 40 MG/1
40 TABLET, DELAYED RELEASE ORAL
Status: DISCONTINUED | OUTPATIENT
Start: 2021-02-13 | End: 2021-02-13

## 2021-02-13 RX ORDER — IPRATROPIUM BROMIDE AND ALBUTEROL SULFATE 2.5; .5 MG/3ML; MG/3ML
1 SOLUTION RESPIRATORY (INHALATION) 4 TIMES DAILY
Status: DISCONTINUED | OUTPATIENT
Start: 2021-02-13 | End: 2021-02-16

## 2021-02-13 RX ORDER — DIVALPROEX SODIUM 250 MG/1
250 TABLET, DELAYED RELEASE ORAL NIGHTLY
Status: DISCONTINUED | OUTPATIENT
Start: 2021-02-13 | End: 2021-02-17 | Stop reason: HOSPADM

## 2021-02-13 RX ORDER — DEXTROSE MONOHYDRATE 50 MG/ML
100 INJECTION, SOLUTION INTRAVENOUS PRN
Status: DISCONTINUED | OUTPATIENT
Start: 2021-02-13 | End: 2021-02-17 | Stop reason: HOSPADM

## 2021-02-13 RX ORDER — PROMETHAZINE HYDROCHLORIDE 25 MG/1
12.5 TABLET ORAL EVERY 6 HOURS PRN
Status: DISCONTINUED | OUTPATIENT
Start: 2021-02-13 | End: 2021-02-17 | Stop reason: HOSPADM

## 2021-02-13 RX ORDER — ACETAMINOPHEN 650 MG/1
650 SUPPOSITORY RECTAL EVERY 6 HOURS PRN
Status: DISCONTINUED | OUTPATIENT
Start: 2021-02-13 | End: 2021-02-17 | Stop reason: HOSPADM

## 2021-02-13 RX ORDER — ERGOCALCIFEROL 1.25 MG/1
50000 CAPSULE ORAL WEEKLY
COMMUNITY

## 2021-02-13 RX ORDER — IPRATROPIUM BROMIDE AND ALBUTEROL SULFATE 2.5; .5 MG/3ML; MG/3ML
1 SOLUTION RESPIRATORY (INHALATION) 2 TIMES DAILY
Status: DISCONTINUED | OUTPATIENT
Start: 2021-02-13 | End: 2021-02-13

## 2021-02-13 RX ADMIN — Medication 10 ML: at 09:00

## 2021-02-13 RX ADMIN — ATORVASTATIN CALCIUM 40 MG: 40 TABLET, FILM COATED ORAL at 21:40

## 2021-02-13 RX ADMIN — HEPARIN SODIUM 5000 UNITS: 5000 INJECTION INTRAVENOUS; SUBCUTANEOUS at 21:45

## 2021-02-13 RX ADMIN — INSULIN LISPRO 1 UNITS: 100 INJECTION, SOLUTION INTRAVENOUS; SUBCUTANEOUS at 21:45

## 2021-02-13 RX ADMIN — INSULIN GLARGINE 5 UNITS: 100 INJECTION, SOLUTION SUBCUTANEOUS at 21:46

## 2021-02-13 RX ADMIN — INSULIN LISPRO 2 UNITS: 100 INJECTION, SOLUTION INTRAVENOUS; SUBCUTANEOUS at 18:04

## 2021-02-13 RX ADMIN — IPRATROPIUM BROMIDE AND ALBUTEROL SULFATE 3 ML: .5; 3 SOLUTION RESPIRATORY (INHALATION) at 16:28

## 2021-02-13 RX ADMIN — IPRATROPIUM BROMIDE AND ALBUTEROL SULFATE 1 AMPULE: .5; 3 SOLUTION RESPIRATORY (INHALATION) at 00:05

## 2021-02-13 RX ADMIN — IPRATROPIUM BROMIDE AND ALBUTEROL SULFATE 3 ML: .5; 3 SOLUTION RESPIRATORY (INHALATION) at 12:15

## 2021-02-13 RX ADMIN — DIVALPROEX SODIUM 250 MG: 250 TABLET, DELAYED RELEASE ORAL at 21:40

## 2021-02-13 RX ADMIN — HEPARIN SODIUM 5000 UNITS: 5000 INJECTION INTRAVENOUS; SUBCUTANEOUS at 06:23

## 2021-02-13 RX ADMIN — MAGNESIUM SULFATE HEPTAHYDRATE 2000 MG: 40 INJECTION, SOLUTION INTRAVENOUS at 09:10

## 2021-02-13 RX ADMIN — HEPARIN SODIUM 5000 UNITS: 5000 INJECTION INTRAVENOUS; SUBCUTANEOUS at 14:54

## 2021-02-13 RX ADMIN — Medication 10 ML: at 21:49

## 2021-02-13 RX ADMIN — IPRATROPIUM BROMIDE AND ALBUTEROL SULFATE 3 ML: .5; 3 SOLUTION RESPIRATORY (INHALATION) at 08:54

## 2021-02-13 RX ADMIN — IPRATROPIUM BROMIDE AND ALBUTEROL SULFATE 3 ML: .5; 3 SOLUTION RESPIRATORY (INHALATION) at 19:55

## 2021-02-13 RX ADMIN — TRAZODONE HYDROCHLORIDE 50 MG: 50 TABLET ORAL at 21:40

## 2021-02-13 ASSESSMENT — PAIN SCALES - GENERAL: PAINLEVEL_OUTOF10: 0

## 2021-02-13 ASSESSMENT — PAIN SCALES - WONG BAKER: WONGBAKER_NUMERICALRESPONSE: 0

## 2021-02-13 NOTE — PROGRESS NOTES
Patient has a permanent pacemaker. Patient has global aphasia, family does not know what kind or model it is but thinks it was done at [address] notified and they will investigate further.

## 2021-02-13 NOTE — PROGRESS NOTES
RESPIRATORY THERAPY ASSESSMENT    Name:  [address] [place] [address] Record Number:  [id number]  Age: 78 y.o. Gender: female  : 1945  Today's Date:  2021  Room:  [address] [place] [address]    Assessment     Is the patient being admitted for a COPD or Asthma exacerbation? No   (If yes the patient will be seen every 4 hours for the first 24 hours and then reassessed)    Patient Admission Diagnosis      Allergies  Allergies   Allergen Reactions    Buspar [Buspirone Hcl]    Pulmonary History:COPD and CHF/Pulmonary Edema  Home Oxygen Therapy:  5 liters/min via nasal cannula  Home Respiratory Therapy:Albuterol/Ipratropium Bromide HHN   Current Respiratory Therapy:  duoneb , albuterol          Respiratory Severity Index(RSI)   Patients with orders for inhalation medications, oxygen, or any therapeutic treatment modality will be placed on Respiratory Protocol. They will be assessed with the first treatment and at least every 72 hours thereafter. The following severity scale will be used to determine frequency of treatment intervention.     Smoking History: Pulmonary Disease or Smoking History, Greater than 15 pack year = 2    Social History  Social History     Tobacco Use    Smoking status: Former Smoker     Packs/day: 3.00     Years: 47.00     Pack years: 141.00     Quit date: 2007     Years since quittin.1    Smokeless tobacco: Never Used    Tobacco comment: unable to assess   Substance Use Topics    Alcohol use: No     Alcohol/week: 0.0 standard drinks     Comment: unable to assess    Drug use: No     Comment: unable to assess       Recent Surgical History: None = 0  Past Surgical History  Past Surgical History:   Procedure Laterality Date    APPENDECTOMY      taken out with gallbladder    CARDIAC PACEMAKER PLACEMENT      CARDIAC SURGERY      pacemaker     CARDIAC SURGERY      STENT    CHOLECYSTECTOMY      COLONOSCOPY      DIAGNOSTIC CARDIAC CATH  partial    OTHER SURGICAL HISTORY  5/28/13    quadroscopy    PACEMAKER INSERTION      PACEMAKER PLACEMENT      TONSILLECTOMY      TUBAL LIGATION      VASCULAR SURGERY      stent placement       Level of Consciousness: Disoriented and Uncooperative = 2    Level of Activity: Walking with assistance = 1    Respiratory Pattern: Regular Pattern; RR 8-20 = 0    Breath Sounds: Absent bilaterally and/or with wheezes = 3    Sputum   ,  ,    Cough: Strong, spontaneous, non-productive = 0    Vital Signs   BP (!) 170/69   Pulse 102   Temp 98.1 °F (36.7 °C) (Oral)   Resp 26   SpO2 97%   SPO2 (COPD values may differ): Less than 86% on room air or greater than 92% on FiO2 greater than 50% = 4    Peak Flow (asthma only): not applicable = 0    RSI: 00-82 = Q6H or QID and Q4HPRN for dyspnea        Plan       Goals: medication delivery, mobilize retained secretions, volume expansion and improve oxygenation    Patient/caregiver was educated on the proper method of use for Respiratory Care Devices:  Yes      Level of patient/caregiver understanding able to:   ? Verbalize understanding   ? Demonstrate understanding       ? Teach back        ? Needs reinforcement       ? No available caregiver               ? Other:     Response to education:  Excellent     Is patient being placed on Home Treatment Regimen? NA     Does the patient have everything they need prior to discharge? NA     Comments: Pt in no resp distress, chart reviewed    Plan of Care: duoneb qid hhn, albuterol prn , 5 liters NC    Electronically signed by Kymberly Jimenez RCP on 2/13/2021 at 5:27 AM    Respiratory Protocol Guidelines     1. Assessment and treatment by Respiratory Therapy will be initiated for medication and therapeutic interventions upon initiation of aerosolized medication. 2. Physician will be contacted for respiratory rate (RR) greater than 35 breaths per minute. Therapy will be held for heart rate (HR) greater than 140 beats per minute, pending direction from physician. 3. Bronchodilators will be administered via Metered Dose Inhaler (MDI) with spacer when the following criteria are met:  a. Alert and cooperative     b. HR < 140 bpm  c. RR < 30 bpm                d. Can demonstrate a 23 second inspiratory hold  4. Bronchodilators will be administered via Hand Held Nebulizer ANTOINETTE [facility] to patients when ANY of the following criteria are met  a. Incognizant or uncooperative          b. Patients treated with HHN at Home        c. Unable to demonstrate proper use of MDI with spacer     d. RR > 30 bpm   5. Bronchodilators will be delivered via Metered Dose Inhaler (MDI), HHN, Aerogen to intubated patients on mechanical ventilation. 6. Inhalation medication orders will be delivered and/or substituted as outlined below. Aerosolized Medications Ordering and Administration Guidelines:    1. All Medications will be ordered by a physician, and their frequency and/or modality will be adjusted as defined by the patients Respiratory Severity Index (RSI) score. 2. If the patient does not have documented COPD, consider discontinuing anticholinergics when RSI is less than 9.  3. If the bronchospasm worsens (increased RSI), then the bronchodilator frequency can be increased to a maximum of every 4 hours. If greater than every 4 hours is required, the physician will be contacted. 4. If the bronchospasm improves, the frequency of the bronchodilator can be decreased, based on the patient's RSI, but not less than home treatment regimen frequency. 5. Bronchodilator(s) will be discontinued if patient has a RSI less than 9 and has received no scheduled or as needed treatment for 72  Hrs. Patients Ordered on a Mucolytic Agent:    1. Must always be administered with a bronchodilator. 2. Discontinue if patient experiences worsened bronchospasm, or secretions have lessened to the point that the patient is able to clear them with a cough. Anti-inflammatory and Combination Medications:    1. If the patient lacks prior history of lung disease, is not using inhaled anti-inflammatory medication at home, and lacks wheezing by examination or by history for at least 24 hours, contact physician for possible discontinuation.

## 2021-02-13 NOTE — PROGRESS NOTES
Pt admitted to ICU via transport team from [facility] [place]. MD notified of pt arrival. Upon arrival pt is restless, attempting to exit bed. Pt CHG bathed on admission. With assessment pt having difficulty with speech,  as well appears to have difficulty with understanding instructions at times. Occasionally pt is able to verbalize, but having difficulty finding words and appears frustrated with trying to express herself. Bed in lowest locked position, side rails up x 3, bed alarm on. MD at bedside now for evaluation.

## 2021-02-13 NOTE — ED PROVIDER NOTES
HYSTERECTOMY  1975    partial    OTHER SURGICAL HISTORY  13    quadroscopy    PACEMAKER INSERTION      PACEMAKER PLACEMENT      TONSILLECTOMY      TUBAL LIGATION      VASCULAR SURGERY      stent placement     Family History   Problem Relation Age of Onset    Diabetes Mother     Hypertension Mother     Cancer Father         lung cancer    Emphysema Father     Cancer Sister         lung cancer    Diabetes Sister     Heart Failure Sister     Cancer Brother         lung cancer    Diabetes Brother     Heart Failure Brother     Hypertension Brother     Asthma Neg Hx      Social History     Socioeconomic History    Marital status: Single     Spouse name: Not on file    Number of children: Not on file    Years of education: Not on file    Highest education level: Not on file   Occupational History    Not on file   Social Needs    Financial resource strain: Not on file    Food insecurity     Worry: Not on file     Inability: Not on file    Transportation needs     Medical: Not on file     Non-medical: Not on file   Tobacco Use    Smoking status: Former Smoker     Packs/day: 3.00     Years: 47.00     Pack years: 141.00     Quit date: 2007     Years since quittin.1    Smokeless tobacco: Never Used    Tobacco comment: unable to assess   Substance and Sexual Activity    Alcohol use: No     Alcohol/week: 0.0 standard drinks     Comment: unable to assess    Drug use: No     Comment: unable to assess    Sexual activity: Never     Partners: Male     Comment: unable to assess   Lifestyle    Physical activity     Days per week: Not on file     Minutes per session: Not on file    Stress: Not on file   Relationships    Social connections     Talks on phone: Not on file     Gets together: Not on file     Attends Mormonism service: Not on file     Active member of club or organization: Not on file     Attends meetings of clubs or organizations: Not on file     Relationship status: Not on file    Intimate partner violence     Fear of current or ex partner: Not on file     Emotionally abused: Not on file     Physically abused: Not on file     Forced sexual activity: Not on file   Other Topics Concern    Not on file   Social History Narrative    Not on file     No current facility-administered medications for this encounter. Current Outpatient Medications   Medication Sig Dispense Refill    divalproex (DEPAKOTE) 250 MG DR tablet Take 1 tablet by mouth 2 times daily 60 tablet 0    traZODone (DESYREL) 50 MG tablet Take 1 tablet by mouth nightly 30 tablet 0    predniSONE (DELTASONE) 2.5 MG tablet Take 3 tablets by mouth daily 90 tablet 0    furosemide (LASIX) 20 MG tablet Take 1 tablet by mouth daily as needed (Swelling) 30 tablet 0    docusate sodium (COLACE) 100 MG capsule Take 1 capsule by mouth 2 times daily 60 capsule 0    Erythromycin 500 MG TBEC Take 500 mg by mouth nightly 30 tablet 0    potassium chloride (KLOR-CON M) 20 MEQ extended release tablet Take 0.5 tablets by mouth daily 15 tablet 0    levothyroxine (SYNTHROID) 125 MCG tablet Take 1 tablet by mouth daily 30 tablet 0    ipratropium-albuterol (DUONEB) 0.5-2.5 (3) MG/3ML SOLN nebulizer solution Inhale 3 mLs into the lungs 2 times daily 180 mL 0    Fluticasone Furoate-Vilanterol (BREO ELLIPTA) 200-25 MCG/INH AEPB USE 1 INHALATION DAILY 3 each 0    Respiratory Therapy Supplies (NEBULIZER/TUBING/MOUTHPIECE) KIT 1 kit by Does not apply route daily as needed (DX COPD J44.9 HUGO 99) [facility]stone 1 kit 11    Nebulizers (COMPRESSOR/NEBULIZER) MISC 1 Device by Does not apply route as needed (DX COPD J44.9 HUGO 99) [facility] 1 each 0    metFORMIN (GLUCOPHAGE) 500 MG tablet Take 500 mg by mouth 2 times daily (with meals)      lidocaine (LIDODERM) 5 % Place 1 patch onto the skin daily 12 hours on, 12 hours off.       guaiFENesin (MUCINEX) 600 MG SR tablet Take 1 tablet by mouth 2 times daily 60 tablet 0    albuterol 2=Performs neither task correctly   2. Best Gaze: 0=normal   3. Visual: 0=No visual loss   4. Facial Palsy: 0=Normal symmetric movement   5a. Motor left arm: 1=Drift, limb holds 90 (or 45) degrees but drifts down before full 10 seconds: does not hit bed   5b. Motor right arm: 1=Drift, limb holds 90 (or 45) degrees but drifts down before full 10 seconds: does not hit bed   6a. motor left le=Drift, limb holds 90 (or 45) degrees but drifts down before full 10 seconds: does not hit bed   6b  Motor right le=Drift, limb holds 90 (or 45) degrees but drifts down before full 10 seconds: does not hit bed   7. Limb Ataxia: 0=Absent   8. Sensory: 0=Normal; no sensory loss   9. Best Language:  1=Mild to moderate aphasia; some obvious loss of fluency or facility of comprehension without significant limitation on ideas expressed or form of expression. 10. Dysarthria: 0=Normal   11. Extinction and Inattention: 0=No abnormality   12. Distal motor function: 0=Normal    Total:  9     LABS  I have reviewed all labs for this visit.    Results for orders placed or performed during the hospital encounter of 21   CBC Auto Differential   Result Value Ref Range    WBC 13.3 (H) 4.0 - 11.0 K/uL    RBC 5.07 4.00 - 5.20 M/uL    Hemoglobin 14.6 12.0 - 16.0 g/dL    Hematocrit 44.3 36.0 - 48.0 %    MCV 87.4 80.0 - 100.0 fL    MCH 28.7 26.0 - 34.0 pg    MCHC 32.9 31.0 - 36.0 g/dL    RDW 14.3 12.4 - 15.4 %    Platelets 585 047 - 082 K/uL    MPV 7.6 5.0 - 10.5 fL    Neutrophils % 74.8 %    Lymphocytes % 16.9 %    Monocytes % 7.1 %    Eosinophils % 0.2 %    Basophils % 1.0 %    Neutrophils Absolute 10.0 (H) 1.7 - 7.7 K/uL    Lymphocytes Absolute 2.3 1.0 - 5.1 K/uL    Monocytes Absolute 0.9 0.0 - 1.3 K/uL    Eosinophils Absolute 0.0 0.0 - 0.6 K/uL    Basophils Absolute 0.1 0.0 - 0.2 K/uL   Comprehensive Metabolic Panel   Result Value Ref Range    Sodium 140 136 - 145 mmol/L    Potassium 4.2 3.5 - 5.1 mmol/L    Chloride 99 99 - 110 mmol/L    CO2 26 21 - 32 mmol/L    Anion Gap 15 3 - 16    Glucose 153 (H) 70 - 99 mg/dL    BUN 18 7 - 20 mg/dL    CREATININE 1.0 0.6 - 1.2 mg/dL    GFR Non-African American 54 (A) >60    GFR African American >60 >60    Calcium 10.2 8.3 - 10.6 mg/dL    Total Protein 7.7 6.4 - 8.2 g/dL    Albumin 4.7 3.4 - 5.0 g/dL    Albumin/Globulin Ratio 1.6 1.1 - 2.2    Total Bilirubin 0.4 0.0 - 1.0 mg/dL    Alkaline Phosphatase 91 40 - 129 U/L    ALT 12 10 - 40 U/L    AST 21 15 - 37 U/L    Globulin 3.0 g/dL   Troponin   Result Value Ref Range    Troponin <0.01 <0.01 ng/mL   Lactic Acid, Plasma   Result Value Ref Range    Lactic Acid 1.2 0.4 - 2.0 mmol/L   Blood Gas, Venous   Result Value Ref Range    pH, Joshua 7.413 7.350 - 7.450    pCO2, Joshua 39.9 (L) 40.0 - 50.0 mmHg    pO2, Joshua 133.9 (H) 25.0 - 40.0 mmHg    HCO3, Venous 24.9 23.0 - 29.0 mmol/L    Base Excess, Joshua 0.4 -3.0 - 3.0 mmol/L    O2 Sat, Joshua 99 Not Established %    Carboxyhemoglobin 1.7 (H) 0.0 - 1.5 %    MetHgb, Joshua 0.3 <1.5 %    TC02 (Calc), Joshua 26 Not Established mmol/L    O2 Content, Joshua 22 Not Established VOL %    O2 Therapy Unknown    APTT   Result Value Ref Range    aPTT 27.9 24.2 - 36.2 sec   Protime-INR   Result Value Ref Range    Protime 13.2 10.0 - 13.2 sec    INR 1.14 0.86 - 1.14   Brain Natriuretic Peptide   Result Value Ref Range    Pro- (H) 0 - 449 pg/mL   Urinalysis Reflex to Culture    Specimen: Urine, clean catch   Result Value Ref Range    Color, UA Yellow Straw/Yellow    Clarity, UA Clear Clear    Glucose, Ur Negative Negative mg/dL    Bilirubin Urine MODERATE (A) Negative    Ketones, Urine 40 (A) Negative mg/dL    Specific Gravity, UA 1.010 1.005 - 1.030    Blood, Urine TRACE-INTACT (A) Negative    pH, UA 6.0 5.0 - 8.0    Protein, UA 30 (A) Negative mg/dL    Urobilinogen, Urine 0.2 <2.0 E.U./dL    Nitrite, Urine Negative Negative    Leukocyte Esterase, Urine Negative Negative    Microscopic Examination YES     Urine Type NotGiven     Urine Reflex to Culture Not Indicated    Urine Drug Screen   Result Value Ref Range    Amphetamine Screen, Urine Neg Negative <1000ng/mL    Barbiturate Screen, Ur Neg Negative <200 ng/mL    Benzodiazepine Screen, Urine Neg Negative <200 ng/mL    Cannabinoid Scrn, Ur Neg Negative <50 ng/mL    Cocaine Metabolite Screen, Urine Neg Negative <300 ng/mL    Opiate Scrn, Ur POSITIVE (A) Negative <300 ng/mL    PCP Screen, Urine Neg Negative <25 ng/mL    Methadone Screen, Urine Neg Negative <300 ng/mL    Propoxyphene Scrn, Ur Neg Negative <300 ng/mL    Oxycodone Urine Neg Negative <100 ng/ml    pH, UA 6.0     Drug Screen Comment: see below    Salicylate   Result Value Ref Range    Salicylate, Serum <0.3 (L) 15.0 - 30.0 mg/dL   Acetaminophen level   Result Value Ref Range    Acetaminophen Level <5 (L) 10 - 30 ug/mL   Ethanol   Result Value Ref Range    Ethanol Lvl None Detected mg/dL   TSH with Reflex   Result Value Ref Range    TSH 1.45 0.27 - 4.20 uIU/mL   COVID-19   Result Value Ref Range    SARS-CoV-2, NAAT Not Detected Not Detected   Microscopic Urinalysis   Result Value Ref Range    Hyaline Casts, UA 6-10 (A) 0 - 2 /LPF    Mucus, UA 1+ (A) None Seen /LPF    WBC, UA 0-2 0 - 5 /HPF    RBC, UA 5-10 (A) 0 - 4 /HPF   EKG 12 Lead   Result Value Ref Range    Ventricular Rate 109 BPM    Atrial Rate 109 BPM    P-R Interval 180 ms    QRS Duration 78 ms    Q-T Interval 330 ms    QTc Calculation (Bazett) 444 ms    P Axis 75 degrees    R Axis 19 degrees    T Axis 66 degrees    Diagnosis       Sinus tachycardiaOtherwise normal ECGNo previous ECGs available       EKG Interpretation    Interpreted by emergency department physician    Rhythm: sinus tachycardia  Rate: 100-110  Axis: normal  ST Segments: nonspecific changes  No change compared to 07/14/20      RADIOLOGY    CT HEAD WO CONTRAST   Final Result   1. Acute ischemic infarct in the right middle cerebral artery territory. No   associated mass effect or intracranial hemorrhage.    2. Acute thrombosis of the M1 segment right middle cerebral artery with   paucity of contrast in distal branches. 3. Severe stenosis of the bilateral posterior cerebral artery P2 segments. 4. 50% atherosclerotic stenosis of the proximal internal carotid arteries. 5. No vertebral artery stenosis. 6. Chronic infarcts in the right basal ganglia, right thalamus, and posterior   right temporal lobe. 7. Severe emphysema. Critical results were called by Dr. Giuliana Lockhart to Conchita Villalobos on   2/12/2021 at 21:28. XR CHEST PORTABLE   Final Result   Apical predominant emphysema. No acute cardiopulmonary disease. CTA HEAD NECK W CONTRAST   Final Result   1. Acute ischemic infarct in the right middle cerebral artery territory. No   associated mass effect or intracranial hemorrhage. 2. Acute thrombosis of the M1 segment right middle cerebral artery with   paucity of contrast in distal branches. 3. Severe stenosis of the bilateral posterior cerebral artery P2 segments. 4. 50% atherosclerotic stenosis of the proximal internal carotid arteries. 5. No vertebral artery stenosis. 6. Chronic infarcts in the right basal ganglia, right thalamus, and posterior   right temporal lobe. 7. Severe emphysema.    Critical results were called by Dr. Giuliana Lockhart to Conchita Villalobos on   2/12/2021 at 21:28.               TIMES:  Last Known Well: 48 hours ago   Stroke Team: Case discussed with  Stroke Team.     Reason for Delays:  [] Social/Jainism  [] Initial refusal of testing or treatment  [] Care team unable to identify eligibility  [] Hypertension requiring aggressive control with IV medications  [] Further diagnostic evaluation to confirm stroke for patients with hypoglycemia (blood glucose <50), seizures, or major metabolic disorders  [] Management of concomitant emergent/acute conditions such as cardiopulmonary arrest, respiratory failure (requiring intubation)  [] Investigational or experimental protocol for thrombolysis    ED COURSE/MDM  Patient seen and evaluated. In summary, patient is a 29-year-old female with history of COPD presents to emergency department for evaluation of altered mental status. Patient was on 5 L nasal cannula O2  (home oxygen per EMS ) maintaining sat above 90. She has diffuse wheezing bilaterally. Last known normal was 48 hours ago. Patient is out of the TPA window. When asked for fever name, patient replies [id number]. Patient does not answer questions or follow commands. Patient was withdrawing all extremities from painful stimuli. No facial droop present. Differential diagnosis includes intracranial bleed, ischemic stroke, hypoglycemia, metabolic encephalopathy, encephalopathy secondary to urinary tract infection, among others. Given this, CBC, CMP, fingerstick glucose, acetaminophen, alcohol, TSH, urinalysis, CT head without contrast, and CTA head and neck obtained. Glucose within normal limits. Creatinine 1.0. Troponin less than 0.01. Mild hypercapnia. TSH within normal limits. Alcohol, acetaminophen, and salicylate negative. COVID-19 negative. Patient given DuoNeb x3 and Decadron as patient has audible wheezing bilaterally. On reassessment, work of breathing and wheezing improved. CT personally reviewed and interpreted. As CT shows hypodensity in the right MCA, [facility] called to ask for a wet read. CT showed acute ischemic infarct in the right middle cerebral artery territory with acute thrombosis of the M1 segment of the right MCA. Given this,  stroke consulted. Discussed case with  stroke and not tPA or embolectomy candidate. [facility]commended [facility]. Patient graciously accepted by Dr. Veronique Mota. Family called to bedside. Per family, patient is full code. On reassessment, mental status improved.   Patient and family updated on the results of the work-up. t-PA NOT given due to the following EXCLUSION CRITERIA (only those checked):  [] Pregnancy  [x] Symptoms > 3 hours of onset  [] Minor or isolated neurological signs  [] Non-disabling stroke  [] Seizure at the same time of stroke symptoms  [] Active bleeding or acute trauma (fracture)  [] Presentation consistent with acute MI or post-MI pericarditis  [] Known intracranial neoplasm, AV malformation or aneurysm  [] CT evidence of intracranial hemorrhage  [] Any prior history of intracranial hemorrhage  [] Symptoms suggestive of subarachnoid hemorrhage (even if head CT normal)  [] Persistent hypertension (SBP>185 or DBP>110)  [] Glucose < 50 or > 400. [] Bleeding diathesis, including but not limited to:   -Platelets < 911,698   -Heparin within 48 hours with PTT > normal range   -Current or recent use of anticoagulants (dabagtran, rivaroxaban, or warfarin with     INR > 1.7)  [] Lumbar puncture in past 7 days  [] Arterial puncture at a noncompressible site in past 7 days  [] Major surgery in past 14 days  [] Gastrointestinal or urinary tract hemorrhage in past 21 days  [] Myocardial infarction in past 3 months  [] Stroke, intracranial surgery or serious head trauma in past 3 months    t-PA given with the following INCLUSION CRITERIA verified (only those checked):  [] Age 25 years or older  [] Clinical diagnosis of ischemic stroke causing measurable neurological deficit  [] Administration of t-PA can be initiated within 3 hours of onset of symptoms  [] A patient or family members who understand the potential risks and benefits:   Of every 100 patients treated with tPA:   72 will have the same outcome   32 will have a better outcome   3 will have a worse outcome (with 1 being severely disabled or fatal) due to t-PA        CLINICAL IMPRESSION  1. Ischemic stroke ([facility] [address]    2. Essential hypertension    3.  Chronic obstructive pulmonary disease, unspecified COPD type ([facility] [address] [facility]        Blood pressure (!) 219/99, pulse 101, temperature 99.1 °F (37.3 °C), temperature source Oral, resp. rate 26, height 5' 2\" (1.575 m), weight 190 lb (86.2 kg), SpO2 99 %. DISPOSITION  Debra Grijalva was transferred to [address]:  Total critical care time today provided was 60 minutes. This excludes seperately billable procedures and family discussion time. John iKrby MD    Comment: Please note this report has been produced using speech recognition software and may contain errors related to that system including errors in grammar, punctuation, and spelling, as well as words and phrases that may be inappropriate. If there are any questions or concerns please feel free to contact the dictating provider for clarification.      John Kirby MD  02/13/21 [id number]

## 2021-02-13 NOTE — ED NOTES
Daughter and next of kin, Travon Plummer informed me that the pt is a full code and that the pt does not have a POA.  Amanda's phone number is [phone]     Joleen Cole RN  02/12/21 1777

## 2021-02-13 NOTE — ED NOTES
Transfer center call back with [facility] dr Jackquline Dakins on there phone     Jasen Stanley  02/12/21 [id number]

## 2021-02-13 NOTE — ED NOTES
Informed Dr. Sweta Soto that when the covid throat swab was done the pt did not give any gag reflex.      Ananda Barriga RN  02/12/21 1646

## 2021-02-13 NOTE — H&P
History and Physical  PGY-1    PCP: Ty Aviles MD    Code:Full Code  Admit Date: 2/13/2021  Diet: Diet NPO Effective Now      History of present illness:      CC: Altered Mental Status     Patient is a 78 y.o. female with a PMHx significant for COPD (on5L of O2 at home), CAD s/p stent, DM, Hypothyroidism, and HLD presented with altered mental status. Patient has expressive aphasia and all history was taken from patient's chart. Per chart, patient was presented to [facility] emergency department for evaluation of altered mental status. Per EMS, patient lives at home by herself. Last time family check on her was 48 hours before her presentation to ED. Son mentions that patient was talking in [place]. Per note, patient was not following commands in the ED. When I ask what her name was, patient responded [phone]. In ED, no family member was present and bedside to provide further history. In [facility] ED, blood pressure was 219/99, heart rate 101, temperature 99.1 °F, respiratory rate 26. CT/CTA head was done and showed Acute ischemic infarct in the right middle cerebral artery territory, Acute thrombosis of the M1 segment right middle cerebral artery with paucity of contrast in distal branches, Severe stenosis of the bilateral posterior cerebral artery P2 segments.  stroke was consulted and patient was not a candidate for TPA or embolectomy. Therefore, patient was transferred to [facility] for admission to ICU.      ROS: Review of Systems -   All other systems reviewed and are negative.     Past Medical / Surgical History:    Past Medical History:   Diagnosis Date    Arthritis     lower back, shoulder blades    Bipolar depression ([facility] [address]     CAD (coronary artery disease)     CHF (congestive heart failure) (HCC)     COPD (chronic obstructive pulmonary disease) (HCC)     Depression     Diabetes mellitus (HCC)     Hyperlipidemia     Pneumonia     Thyroid disease     hypothyroidism  Unspecified cerebral artery occlusion with cerebral infarction     2006     Past Surgical History:   Procedure Laterality Date    APPENDECTOMY      taken out with gallbladder    CARDIAC PACEMAKER PLACEMENT      CARDIAC SURGERY      pacemaker 2003    CARDIAC SURGERY      STENT    CHOLECYSTECTOMY      COLONOSCOPY      DIAGNOSTIC CARDIAC CATH LAB PROCEDURE      HYSTERECTOMY  1975    partial    OTHER SURGICAL HISTORY  5/28/13    quadroscopy    PACEMAKER INSERTION      PACEMAKER PLACEMENT      TONSILLECTOMY      TUBAL LIGATION      VASCULAR SURGERY      stent placement       Medications Prior to Admission:    No current facility-administered medications on file prior to encounter.       Current Outpatient Medications on File Prior to Encounter   Medication Sig Dispense Refill    divalproex (DEPAKOTE) 250 MG DR tablet Take 1 tablet by mouth 2 times daily 60 tablet 0    traZODone (DESYREL) 50 MG tablet Take 1 tablet by mouth nightly 30 tablet 0    predniSONE (DELTASONE) 2.5 MG tablet Take 3 tablets by mouth daily 90 tablet 0    furosemide (LASIX) 20 MG tablet Take 1 tablet by mouth daily as needed (Swelling) 30 tablet 0    docusate sodium (COLACE) 100 MG capsule Take 1 capsule by mouth 2 times daily 60 capsule 0    Erythromycin 500 MG TBEC Take 500 mg by mouth nightly 30 tablet 0    potassium chloride (KLOR-CON M) 20 MEQ extended release tablet Take 0.5 tablets by mouth daily 15 tablet 0    levothyroxine (SYNTHROID) 125 MCG tablet Take 1 tablet by mouth daily 30 tablet 0    ipratropium-albuterol (DUONEB) 0.5-2.5 (3) MG/3ML SOLN nebulizer solution Inhale 3 mLs into the lungs 2 times daily 180 mL 0    Fluticasone Furoate-Vilanterol (BREO ELLIPTA) 200-25 MCG/INH AEPB USE 1 INHALATION DAILY 3 each 0    Respiratory Therapy Supplies (NEBULIZER/TUBING/MOUTHPIECE) KIT 1 kit by Does not apply route daily as needed (DX COPD J44.9 HUGO 99) [facility] 1 kit 11  Nebulizers (COMPRESSOR/NEBULIZER) MISC 1 Device by Does not apply route as needed (DX COPD J44.9 HUGO 99) [facility] 1 each 0    metFORMIN (GLUCOPHAGE) 500 MG tablet Take 500 mg by mouth 2 times daily (with meals)      lidocaine (LIDODERM) 5 % Place 1 patch onto the skin daily 12 hours on, 12 hours off.  guaiFENesin (MUCINEX) 600 MG SR tablet Take 1 tablet by mouth 2 times daily 60 tablet 0    albuterol (PROVENTIL;VENTOLIN) 90 MCG/ACT inhaler Inhale 2 puffs into the lungs every 6 hours as needed for Wheezing or Shortness of Breath. 1 Inhaler 6    esomeprazole (NEXIUM) 40 MG capsule Take 40 mg by mouth every morning (before breakfast).  clopidogrel (PLAVIX) 75 MG tablet Take 75 mg by mouth daily. Allergies:  Buspar [buspirone hcl]    Social History:   TOBACCO:   reports that she quit smoking about 13 years ago. She has a 141.00 pack-year smoking history. She has never used smokeless tobacco.       ETOH:   reports no history of alcohol use. Family History:       Problem Relation Age of Onset    Diabetes Mother     Hypertension Mother     Cancer Father         lung cancer    Emphysema Father     Cancer Sister         lung cancer    Diabetes Sister     Heart Failure Sister     Cancer Brother         lung cancer    Diabetes Brother     Heart Failure Brother     Hypertension Brother     Asthma Neg Hx        Vital/I&O/Physical examination:   VS:  BP (!) 170/69   Pulse 102   Temp 98.1 °F (36.7 °C) (Oral)   Resp 26   SpO2 97%     I/O:  No intake or output data in the 24 hours ending 02/13/21 0316    PE:  Physical Exam  Vitals signs reviewed. Constitutional:       Comments: -Obese, not in acute distress   HENT:      Head: Normocephalic and atraumatic. Nose: Nose normal.      Mouth/Throat:      Mouth: Mucous membranes are moist.   Eyes:      Extraocular Movements: Extraocular movements intact.       Conjunctiva/sclera: Conjunctivae normal. Pupils: Pupils are equal, round, and reactive to light. Neck:      Musculoskeletal: Normal range of motion and neck supple. Cardiovascular:      Rate and Rhythm: Normal rate and regular rhythm. Pulses: Normal pulses. Heart sounds: Normal heart sounds. Pulmonary:      Effort: Pulmonary effort is normal.      Breath sounds: Wheezing present. Comments: -Is on 5 L of oxygen. Abdominal:      Palpations: Abdomen is soft. Musculoskeletal: Normal range of motion. Neurological:      Comments: -Patient has expressive aphasia. Can follow simple commands. Can move bilateral upper and lower extremities. Expressive aphasia is present. Could not assess sensory deficit. Labs & Imaging:   LABS:  CBC:   Recent Labs     02/12/21 2035   WBC 13.3*   HGB 14.6   HCT 44.3      MCV 87.4                            Renal:   Recent Labs     02/12/21 2035      K 4.2   CL 99   CO2 26   BUN 18   CREATININE 1.0   GLUCOSE 153*   ANIONGAP 15     Hepatic:   Recent Labs     02/12/21 2035   AST 21   ALT 12   BILITOT 0.4   ALKPHOS 91     Troponin:   Recent Labs     02/12/21 2035   TROPONINI <0.01     BNP: No results for input(s): BNP in the last 72 hours. Lipids: No results for input(s): CHOL, HDL in the last 72 hours. Invalid input(s): LDLCALCU, TRIGLYCERIDE  INR:   Recent Labs     02/12/21 2035   INR 1.14     Lactate: No results for input(s): LACTATE in the last 72 hours. ABGs:No results for input(s): PHART, AEY5PXT, PO2ART, UCX3FYZ, BEART, THGBART, P4SYVKNC, CWY9MLF in the last 72 hours.     UA:  Recent Labs     02/12/21 2140   COLORU Yellow   PHUR 6.0  6.0   WBCUA 0-2   RBCUA 5-10*   MUCUS 1+*   CLARITYU Clear   SPECGRAV 1.010   LEUKOCYTESUR Negative   UROBILINOGEN 0.2   BILIRUBINUR MODERATE*   BLOODU TRACE-INTACT*   GLUCOSEU Negative        IMAGING:  CT head without contrast    (Results Pending)       Assessment & Plan: Landon Osler is a 78 y.o. female with PMHx significant for COPD (on5L of O2 at home), CAD s/p stent, DM, Hypothyroidism, and HLD presented with altered mental status. Ischemic stroke [facility]):  -Patient presented to ED with chief complaint of altered mental status. CT/CTA head was done and showed Acute ischemic infarct in the right middle cerebral artery territory, Acute thrombosis of the M1 segment right middle cerebral artery with paucity of contrast in distal branches, Severe stenosis of the bilateral posterior cerebral artery P2 segments. UC stroke was consulted and patient was not a candidate for TPA or embolectomy. -Repeat CT head  -Atorvastatin 40 mg  Neuro check every hour  Permissive hypertension  -Lipid level  -Hemoglobin A1c level  -SLP  -MRI  -PT/OT  -Echo  Neurology consult    Hypertensive urgency:  -Patient presented to [facility] with blood pressure of 219/99. Currently, blood pressure is 176/99. Permissive hypertension. CHFpEF:  Patient has history of CHFpEF. Her last Echo was on 07/18/2016 with EF of > 10% and diastolic filling suggest grade I diastolic dysfunction.  -Lasix 20 mg    DM II:  -Last Hemoglobin A1c 6.2 (07/11/2020). Takes Metformin 500 mg twice daily at home.  -Hold forming for now  Hemoglobin A1c level  MDSS    Hypothyroidism:  -Last TSH 1.45 (02/12/20210)  -Taks levothyroxine 125 MCG daily    COPD:  Patient has history of COPD.   She is on 5 L of oxygen at home.  -DuoNeb  Albuterol  -Prednisone 7.5 mg daily    CAD s/p stent:  -Aspirin  -Plavix    Pacemaker 2013:    Psych:  -Depakote 250 twice daily  -Trazodone 15 mg      Code Status: Full Code  Diet NPO Effective Now  PPX: Protonix, SCDs  DISPO: ICU      This patient will be discussed with attending, Shreya Ceja MD.    Uriel Adkins MD, PGY- 1  Contact via [employer]  2/13/2021,  3:16 AM

## 2021-02-13 NOTE — PROGRESS NOTES
Speech Language Pathology  Facility/Department: [facility] ICU  Initial Speech/Language/Cognitive Assessment    NAME: Tio Santacruz  : 1945   MRN: [id number]  ADMISSION DATE: 2021  ADMITTING DIAGNOSIS: has Pneumonia; Chronic obstructive pulmonary disease ([facility] [address] Type II diabetes mellitus, well controlled ([facility] [address] CAD (coronary artery disease); Acute-on-chronic respiratory failure ([facility] [address] Sepsis ([facility] [address] Pleural effusion; Hyperkalemia; CAP (community acquired pneumonia); Chest pain; Chest pressure; Shortness of breath; History of coronary artery disease; MDD (major depressive disorder), single episode; Chronic ischemic heart disease; Pacemaker; Chronic respiratory failure with hypoxia ([facility] [address] Chronic bilateral low back pain without sciatica; and Acute ischemic stroke [facility]) on their problem list.  DATE ONSET: 2021    Date of Eval: 2021   Evaluating Therapist: MARCIAL Israel    RECENT RESULTS  CT OF HEAD: (2021)  Impression        Evolving right MCA territory infarct.  No hemorrhage, herniation, or mass-   effect.         Primary Complaint: aphasia    Pain:  Pain Assessment  Pain Assessment: Faces  Pain Level: 0  Li-Baker Pain Rating: No hurt    Assessment:  Aphasia Diagnosis: Moderate-severe expressive-receptive aphasia   Diagnosis: Patient presents with mod-severe expressive/receptive aphasia; receptive deficits appear greater than expressive. Recommendations:  Requires SLP Intervention: Yes  Duration/Frequency of Treatment: 2-3x/wk for 1-2 wks or LOS  D/C Recommendations: To be determined       Plan:   Goals:  Short-term Goals  Timeframe for Short-term Goals: 1-2 wks or LOS  Goal 1: Patient will communicate basic wants/needs by any means. Goal 2: Patient will follow basic level directions with moderate cues. Goal 3: Patient will complete naming tasks with 70% accuracy given max cues. Goal 4: Patient will answer yes/no questions with 60% accuracy. Goal 5: Patient will tolerate ongoing cognitive/linguistic assessment. Patient/family involved in developing goals and treatment plan: yes, patient    Subjective:   Previous level of function and limitations: Independent  General  Chart Reviewed: Yes  Patient assessed for rehabilitation services?: Yes  Family / Caregiver Present: No  General Comment  Comments: Per admitting H&P (02/13/2021): 'Patient is a 78 y.o. female with a PMHx significant for COPD (on5L of O2 at home), CAD s/p stent, DM, Hypothyroidism, and HLD presented with altered mental status. Patient has expressive aphasia and all history was taken from patient's chart. Per chart, patient was presented to [facility] emergency department for evaluation of altered mental status. Per EMS, patient lives at home by herself. Last time family check on her was 48 hours before her presentation to ED. Son mentions that patient was talking in [place]. Per note, patient was not following commands in the ED. When I ask what her name was, patient responded [phone]. In ED, no family member was present and bedside to provide further history. In [facility] ED, blood pressure was 219/99, heart rate 101, temperature 99.1 °F, respiratory rate 26. CT/CTA head was done and showed Acute ischemic infarct in the right middle cerebral artery territory, Acute thrombosis of the M1 segment right middle cerebral artery with paucity of contrast in distal branches, Severe stenosis of the bilateral posterior cerebral artery P2 segments. UC stroke was consulted and patient was not a candidate for TPA or embolectomy. Therefore, patient was transferred to [facility] [place] 2[facility] for admission to ICU.'  Subjective  Subjective: Patient awake, alert, pleasant, seen seated up in bed.      Vision  Vision: (unable to assess)  Hearing  Hearing: (unable to assess)        Objective:     Oral/Motor  Oral Motor: (unable to assess d/t receptive aphasia)    Auditory Comprehension Comprehension: Exceptions  Yes/No Questions: Severe  Basic Questions: Severe  Complex Questions: Severe  One Step Basic Commands: Severe    Expression  Primary Mode of Expression: Verbal    Verbal Expression  Verbal Expression: Exceptions to functional limits  Initiation: Moderate  Repetition: Moderate  Automatic Speech: Moderate  Confrontation: Severe  Responsive: Moderate  Conversation: Severe    Motor Speech  Motor Speech: Within Functional Limits    Cognition:      Orientation  Overall Orientation Status: Impaired  Orientation Level: Oriented to person;Disoriented to place  Attention  Attention: Unable to assess  Memory  Memory: Unable to assess  Problem Solving  Problem Solving: Unable to assess  Numeric Reasoning  Numeric Reasoning: Unable to assess  Abstract Reasoning  Abstract Reasoning: Unable to assess  Safety/Judgement  Safety/Judgement: Unable to assess    Prognosis:  Speech Therapy Prognosis  Prognosis: Fair  Prognosis Considerations: Age  Individuals consulted  Consulted and agree with results and recommendations: Patient    Education:  Patient Education: Educated pt re: role of SLP, purpose of visit, CVA, speech impairment, therapy plan  Patient Education Response: No evidence of learning  Safety Devices in place: Yes  Type of devices: Other (comment); All fall risk precautions in place(diagnostic staff at side)    Therapy Time:   Individual Concurrent Group Co-treatment   Time In 1250         Time Out 1305         Minutes 15            Timed Code Treatment Minutes: 0 Minutes  [facility]: [address] [facility] [place] [id number]  Speech-Language Pathologist  Pager: [phone]    This document will serve as a discharge summary if pt discharges before next treatment.

## 2021-02-13 NOTE — CONSULTS
On my visit 12 hours later, she appears to have improving strength in her left arm and leg but her language is severely affected.      Medical History:  Past Medical History:   Diagnosis Date    Arthritis     lower back, shoulder blades    Bipolar depression ([facility] [address]     CAD (coronary artery disease)     CHF (congestive heart failure) (HCC)     COPD (chronic obstructive pulmonary disease) ([facility] [address]     Depression     Diabetes mellitus ([facility] [address]     Hyperlipidemia     Oxygen dependent     Pneumonia     Thyroid disease     hypothyroidism    Unspecified cerebral artery occlusion with cerebral infarction     2006     Past Surgical History:   Procedure Laterality Date    APPENDECTOMY      taken out with gallbladder    CARDIAC PACEMAKER PLACEMENT      CARDIAC SURGERY      pacemaker 2003    CARDIAC SURGERY      STENT    CHOLECYSTECTOMY      COLONOSCOPY      DIAGNOSTIC CARDIAC CATH LAB PROCEDURE      HYSTERECTOMY  1975    partial    OTHER SURGICAL HISTORY  5/28/13    quadroscopy    PACEMAKER INSERTION      PACEMAKER PLACEMENT      TONSILLECTOMY      TUBAL LIGATION      VASCULAR SURGERY      stent placement     Medications Prior to Admission: divalproex (DEPAKOTE) 250 MG DR tablet, Take 1 tablet by mouth 2 times daily  traZODone (DESYREL) 50 MG tablet, Take 1 tablet by mouth nightly  predniSONE (DELTASONE) 2.5 MG tablet, Take 3 tablets by mouth daily  furosemide (LASIX) 20 MG tablet, Take 1 tablet by mouth daily as needed (Swelling)  docusate sodium (COLACE) 100 MG capsule, Take 1 capsule by mouth 2 times daily  Erythromycin 500 MG TBEC, Take 500 mg by mouth nightly  potassium chloride (KLOR-CON M) 20 MEQ extended release tablet, Take 0.5 tablets by mouth daily  levothyroxine (SYNTHROID) 125 MCG tablet, Take 1 tablet by mouth daily  ipratropium-albuterol (DUONEB) 0.5-2.5 (3) MG/3ML SOLN nebulizer solution, Inhale 3 mLs into the lungs 2 times daily Fluticasone Furoate-Vilanterol (BREO ELLIPTA) 200-25 MCG/INH AEPB, USE 1 INHALATION DAILY  Respiratory Therapy Supplies (NEBULIZER/TUBING/MOUTHPIECE) KIT, 1 kit by Does not apply route daily as needed (DX COPD J44.9 HUGO 99) [facility]  Nebulizers (COMPRESSOR/NEBULIZER) MISC, 1 Device by Does not apply route as needed (DX COPD J44.9 HUGO 99) [facility]  metFORMIN (GLUCOPHAGE) 500 MG tablet, Take 500 mg by mouth 2 times daily (with meals)  lidocaine (LIDODERM) 5 %, Place 1 patch onto the skin daily 12 hours on, 12 hours off.  guaiFENesin (MUCINEX) 600 MG SR tablet, Take 1 tablet by mouth 2 times daily  albuterol (PROVENTIL;VENTOLIN) 90 MCG/ACT inhaler, Inhale 2 puffs into the lungs every 6 hours as needed for Wheezing or Shortness of Breath.  esomeprazole (NEXIUM) 40 MG capsule, Take 40 mg by mouth every morning (before breakfast). clopidogrel (PLAVIX) 75 MG tablet, Take 75 mg by mouth daily.     Allergies   Allergen Reactions    Buspar [Buspirone Hcl]      Family History   Problem Relation Age of Onset    Diabetes Mother     Hypertension Mother     Cancer Father         lung cancer    Emphysema Father     Cancer Sister         lung cancer    Diabetes Sister     Heart Failure Sister     Cancer Brother         lung cancer    Diabetes Brother     Heart Failure Brother     Hypertension Brother     Asthma Neg Hx      Social History     Tobacco Use   Smoking Status Former Smoker    Packs/day: 3.00    Years: 47.00    Pack years: 141.00    Quit date: 2007    Years since quittin.1   Smokeless Tobacco Never Used   Tobacco Comment    unable to assess     Social History     Substance and Sexual Activity   Drug Use No    Comment: unable to assess     Social History     Substance and Sexual Activity   Alcohol Use No    Alcohol/week: 0.0 standard drinks    Comment: unable to assess       ROS:  DANDRE given aphasia    Exam:  Constitutional    Vital signs: BP, HR, and RR reviewed General Alert, no distress, well-nourished  Eyes: unable to visualize fundi   Cardiovascular: pulses symmetric in all 4 extremities. No peripheral edema. Psychiatric: unable to assess given comatose state  Neurologic  Mental status: alert  orientation unable to assess given aphasia  General fund of knowledge unable to assess given aphasia   Memory unable to assess given aphasia  Attention unable to assess given aphasia   Language aphasic. Reflexive speech intact \"wait a minute\" \"um. Jodeen Itz Jodeen [place] \"  Comprehension not following any commands  CN2: Visual Fields: blinks to threat bilaterally   CN 3,4,6: pupils equal and reactive to light, extraocular muscles intact by tracking  CN5: facial sensation unable to assess given aphasia  CN7: no dysarthria. Mild left facial weakness  CN8: hearing unable to full assess but appears to hear my voice   CN9: unable to assess given aphasia  CN11: unable to assess given aphasia   CN12: unable to assess given aphasia   Strength: moving all four limbs symmetrically but formal exam limited by aphasia  Deep tendon reflexes: normal and symmetric in all 4 extremities  Sensory: deferred in upper limbs given aphasia and alertness; responds briskly to pain in both lower limbs  Cerebellar/coordination: unable to assess given aphasia  Tone: normal in all 4 extremities  Gait: deferred for safety given weakness    Labs  Glucose 250 mg/dL   mg/dL (July 2020)  A1c 6.2% (July 2020)  Toxicology + opiates  WBC 10.1K  INR 1.14  Platelet 570V  LHCIQ-[id number] not detected      Studies  Head CT 4 AM 2/13: no hemorrhage; evolving right MCA infarct    CT-A head and neck 2/12/21  1. Acute ischemic infarct in the right middle cerebral artery territory.  No   associated mass effect or intracranial hemorrhage. 2. Acute thrombosis of the M1 segment right middle cerebral artery with   paucity of contrast in distal branches. 3. Severe stenosis of the bilateral posterior cerebral artery P2 segments. 4. 50% atherosclerotic stenosis of the proximal internal carotid arteries. 5. No vertebral artery stenosis. 6. Chronic infarcts in the right basal ganglia, right thalamus, and posterior   right temporal lobe. 7. Severe emphysema. EKG: Sinus tachycardia     Scheduled Medications   [Held by provider] predniSONE  7.5 mg Oral Daily    [Held by provider] clopidogrel  75 mg Oral Daily    [Held by provider] divalproex  250 mg Oral BID    [Held by provider] pantoprazole  40 mg Oral QAM AC    [Held by provider] levothyroxine  125 mcg Oral Daily    [Held by provider] traZODone  50 mg Oral Nightly    sodium chloride flush  10 mL Intravenous 2 times per day    [Held by provider] aspirin  81 mg Oral Daily    [Held by provider] atorvastatin  40 mg Oral Nightly    insulin lispro  0-12 Units Subcutaneous TID WC    insulin lispro  0-6 Units Subcutaneous Nightly    heparin (porcine)  5,000 Units Subcutaneous 3 times per day    ipratropium-albuterol  1 vial Inhalation 4x daily    magnesium sulfate  2,000 mg Intravenous Once    insulin glargine  5 Units Subcutaneous Nightly       Impression:  Vlad Greer is a 78 y.o. female with history of stroke, CHF, HLD, and DM, who presented with aphasia found to have right MCA and right M1 thrombus, outside the window for neurointervention. Exam notable for severe language deficits but good strength in all four limbs. Vessel imaging with no significant cervical atherosclerosis, raising suspicion for cardioembolic source (made more likely by given history of CHF). Recommendations:  - Allow for permissive hypertension for the next 24 hours. Gradual reduction thereafter with eventual goal of less than 140/90 mmHg.  - SLP for dysphagia, aphasia evaluation.   - PO asa 81 mg daily when able. If she cannot pass swallow evaluation can start [place] asa.  - Check lipids and A1c.   - MRI brain w/o radha  - 2D echo. May consider DELMIS if unrevealing. - Continue on telemetry for duration of hospital admission, in particular to monitor for paroxysmal atrial fibrillation or atrial flutter. If work up is otherwise unrevealing could consider long term cardiac monitoring to look for atrial fibrillation.   - Okay for subcutaneous heparin for DVT prophylaxis.   - Physical and occupational therapy, rehabilitation as needed  - OK to change to q4h neuro checks     A copy of this note was provided for Dr Bhavna Pimentel MD     [facility] NP  [phone]  Evenings, weekends, and off weeks please discuss neurologist on-call

## 2021-02-13 NOTE — PROGRESS NOTES
Patient is tearful, continues to have expressive and receptive aphasia. Does get some words out that are clear but only 1-2 words. Does not seem to be in any physical discomfort. VSS, not requiring Labetalol at this time. Will need speech evaluation before able to give any PO. Reassured patient that we are here for her and taking care of her.

## 2021-02-13 NOTE — ED NOTES
Pt has family in room.  Pt is able to hold duoneb at this time     [facility], SRIDHAR  02/13/21 0658

## 2021-02-13 NOTE — PROGRESS NOTES
Speech Language Pathology  Facility/Department: [facility] ICU   CLINICAL BEDSIDE SWALLOW EVALUATION    NAME: Jaron Adams  : 1945  MRN: [id number]    ADMISSION DATE: 2021  ADMITTING DIAGNOSIS: has Pneumonia; Chronic obstructive pulmonary disease ([address] Type II diabetes mellitus, well controlled ([address] CAD (coronary artery disease); Acute-on-chronic respiratory failure ([address] Sepsis ([address] Pleural effusion; Hyperkalemia; CAP (community acquired pneumonia); Chest pain; Chest pressure; Shortness of breath; History of coronary artery disease; MDD (major depressive disorder), single episode; Chronic ischemic heart disease; Pacemaker; Chronic respiratory failure with hypoxia ([address] Chronic bilateral low back pain without sciatica; and Acute ischemic stroke (HCC) on their problem list.  ONSET DATE: 2021    Recent Chest Xray: (2021)     Impression   Apical predominant emphysema.       No acute cardiopulmonary disease. Recent CT Head: (2021)     Impression        Evolving right MCA territory infarct.  No hemorrhage, herniation, or mass-   effect. Date of Eval: 2021  Evaluating Therapist: Laine Kennedy    Current Diet level:  Current Diet : NPO  Current Liquid Diet : NPO      Primary Complaint  Patient Complaint: Unable to state d/t aphasia. Pain:  Pain Assessment  Pain Assessment: Faces  Pain Level: 0  Li-Baker Pain Rating: No hurt    Reason for Referral  Jaron Adams was referred for a bedside swallow evaluation to assess the efficiency of her swallow function, identify signs and symptoms of aspiration and make recommendations regarding safe dietary consistencies, effective compensatory strategies, and safe eating environment.     Impression  Dysphagia Diagnosis: Mild oral stage dysphagia Comments: Per admitting H&P (02/13/2021): 'Patient is a 78 y.o. female with a PMHx significant for COPD (on5L of O2 at home), CAD s/p stent, DM, Hypothyroidism, and HLD presented with altered mental status. Patient has expressive aphasia and all history was taken from patient's chart. Per chart, patient was presented to [facility] emergency department for evaluation of altered mental status. Per EMS, patient lives at home by herself. Last time family check on her was 48 hours before her presentation to ED. Son mentions that patient was talking in [place]. Per note, patient was not following commands in the ED. When I ask what her name was, patient responded [phone]. In ED, no family member was present and bedside to provide further history. In [facility] ED, blood pressure was 219/99, heart rate 101, temperature 99.1 °F, respiratory rate 26. CT/CTA head was done and showed Acute ischemic infarct in the right middle cerebral artery territory, Acute thrombosis of the M1 segment right middle cerebral artery with paucity of contrast in distal branches, Severe stenosis of the bilateral posterior cerebral artery P2 segments.  stroke was consulted and patient was not a candidate for TPA or embolectomy. Therefore, patient was transferred to [facility] for admission to ICU.'  Subjective  Subjective: Patient awake, alert, resting in bed; on 2L O2 via nc. Behavior/Cognition: Alert; Cooperative;Pleasant mood; Doesn't follow directions  Respiratory Status: O2 via nasual cannula  O2 Device: Nasal cannula  Liters of Oxygen: 2 L  Communication Observation: Aphasia  Follows Directions: Simple(very inconsistent; needs visual cues)  Dentition: Adequate  Patient Positioning: Upright in bed  Baseline Vocal Quality: Normal  Volitional Cough: Strong  Prior Dysphagia History: Patient seen by speech in 2013 for bedside swallow evaluation; at that time, recommended Dysphagia II / Thin Liquids Consistencies Administered: Dysphagia Pureed (Dysphagia I); Thin - cup; Thin - straw;Dysphagia Minced and Moist (Dysphagia II)    Vision/Hearing  Vision  Vision: (unable to assess)  Hearing  Hearing: (unable to assess)    Oral Motor Deficits  Oral/Motor  Oral Motor: (unable to assess; pt with inconsistent command following)    Prognosis  Prognosis  Prognosis for safe diet advancement: good  Barriers to reach goals: language deficits  Individuals consulted  Consulted and agree with results and recommendations: Patient    Education  Patient Education: Educated pt re: role of SLP, purpose of visit, swallow function/recommendations. Patient Education Response: Verbalizes understanding  Safety Devices in place: Yes  Type of devices: Other (comment)(diagnostic staff at bedside)       Therapy Time  SLP Individual Minutes  Time In: 1274  Time Out: 1250  Minutes: 15     SLP Total Treatment Time  Timed Code Treatment Minutes: 0 Minutes  Total Treatment Time: 15    Plan  Diet Recommendations: Dysphagia II Minced & Moist Solids / Thins (no straws) / meds in puree; as tolerated. If signs of aspiration or associated decline in respiratory status arise, recommend withhold PO and contact speech. Discharge Plan:  TBD  Discussed with RN, Arslan Petersen, prior to session. Needs within reach. Electronically Signed by:  Tanner Lees M.A., Sal Hare   Speech-Language Pathologist  Pager #[phone]    This document will serve as a discharge summary if pt discharges before next treatment.

## 2021-02-13 NOTE — PROGRESS NOTES
INTERNAL MEDICINE TRANSFER ACCEPT NOTE    HPI from ICU consult note: \"Patient is a 69 y. o. female with a PMHx significant for COPD (on5L of O2 at home), CAD s/p stent, DM, Hypothyroidism, and HLD presented with altered mental status.      Patient has expressive aphasia and all history was taken from patient's chart.  Per chart, patient was presented to [place] [address] emergency department for evaluation of altered mental status.  Per EMS, patient lives at home by herself. Percell Gram time family check on her was 50 hours before her presentation to ED. Momo Earsolis mentions that patient was talking in [facility].  Per note, patient was not following commands in the ED.  When I ask what her name was, patient responded [phone].  In ED, no family member was present and bedside to provide further history.     In [place] [address] ED, blood pressure was 219/99, heart rate 101, temperature 99.1 °F, respiratory rate 26.  CT/CTA head was done and showed Acute ischemic infarct in the right middle cerebral artery territory, Acute thrombosis of the M1 segment right middle cerebral artery with paucity of contrast in distal branches, Severe stenosis of the bilateral posterior cerebral artery P2 segments.   stroke was consulted and patient was not a candidate for TPA or embolectomy. Therefore, patient was transferred to [facility] for admission to ICU. \"    S: Right MCA ischemic stroke with global aphasia. Out of window for tPA. Hypertension reasonably controlled with prn labetalol. Norvasc to start tonight. Aphasia improving with therapy. Hx of of severe COPD/emphysema on prednisone as well, not in exacerbation however.     O:  BP (!) 202/79   Pulse 91   Temp 98.2 °F (36.8 °C) (Oral)   Resp 29   Wt 184 lb 11.9 oz (83.8 kg)   SpO2 97%   BMI 33.79 kg/m²     General appearance: Lying in bed in no acute distress, appears stated age, cooperative  HEENT Normal cephalic, atraumatic, EOMI, PERRL, no LAD, no oropharyngeal erythema Lungs: CTAB, no wheezes, rhonchi or rales  Heart: RRR +S1/S2 without murmurs, rubs or gallops  Abdomen: Soft, NTND, without rigidity or guarding, normal active bowel sounds  Extremities: no edema  Skin: no rashes  Neurologic: spontaneously moving all extremities, though diminished strength to hand squeeze. EOMs intact, PERRLA, difficulty following instructions for full neuro exam due to receptive aphasia    A/P:    Ischemic stroke [facility]):  -Patient presented to ED with chief complaint of altered mental status. CT/CTA head was done and showed Acute ischemic infarct in the right middle cerebral artery territory, Acute thrombosis of the M1 segment right middle cerebral artery with paucity of contrast in distal branches, Severe stenosis of the bilateral posterior cerebral artery P2 segments. [facility] stroke was consulted and patient was not a candidate for TPA or embolectomy. -Repeat CT head  -Atorvastatin PO 40 mg qHs  Neuro checks q4h  Permissive hypertension for 24-48 hr  -PRN labetalol added for SBP >220/120, start BP medications tomorrow morning. Gradual reduction thereafter with eventual goal of less than 140/90 mmHg. -Lipid level  -Hemoglobin A1c level  -SLP, sips with meds until full SLP eval   -MRI pending, patient has medtronic pacemaker  -PT/OT  -Echo pending  Neurology consult     Hypertensive urgency:  -Patient presented to [place] ED with blood pressure of 219/99.  Currently, blood pressure is 176/99. Permissive hypertension for next 24-48 hrs  - start norvasc 5 mg pO qHS tonight      CHFpEF:  Patient has history of CHFpEF. Her last Echo was on 07/18/2016 with EF of > 28% RRY diastolic filling suggest grade I diastolic dysfunction.  -Lasix 20 mg     DM II:  -Last Hemoglobin A1c 6.2 (07/11/2020).   Takes Metformin 500 mg twice daily at home.   - Hold for now  [employer]  MDSS     Hypothyroidism:  -Last TSH 1.45 (02/12/20210)  -Takes levothyroxine 125 MCG daily     COPD: Patient has history of COPD.  She is on 5 L of oxygen at home. - DuoNeb  - Albuterol  - Prednisone 7.5 mg daily     CAD s/p stent:  -Aspirin  -Plavix     Psych:  - level was 2.8 so clearly was not taking at home  - continue Depakote 250 twice daily  - continue trazodone 15 mg     Code Status: full  FEN: NPO, awaiting slp eval.   PPX: protonix, SCD  DISPO: transfer accept to     The objective and subjective findings as well as the ICU course of treatment have been reviewed with the ICU team. The treatment plan has been reviewed with the ICU team. The patient is being transferred to [place] [address] in stable condition.      Edgar Yoon MD, PGY-2  [employer]

## 2021-02-13 NOTE — ED NOTES
Called and informed Amanda that her Mom was negative for COVID and she can come visit her.      Lennox Wang RN  02/12/21 8741

## 2021-02-13 NOTE — CONSULTS
ICU Progress Note    Admit Date: 2/13/2021  Day: 1  Vent Day: None  IV Access:Peripheral  IV Fluids:None  Vasopressors:None                Antibiotics: None  Diet: Diet NPO Effective Now    CC: AMS    Interval history:  Patient is tearful, continues to have expressive and receptive aphasia. Does get some words out that are clear but only 1-2 words. Does not seem to be in any physical discomfort. Labetalol ordered PRN for SBP >220. HPI: Patient is a 78 y.o. female with a PMHx significant for COPD, CAD s/p stent, DM, Hypothyroidism, and HLD presented with altered mental status.      Patient has expressive aphasia and all history was taken from patient's chart. Per chart, patient was presented to [place] emergency department for evaluation of altered mental status. Per EMS, patient lives at home by herself. Last time family check on her was 48 hours before her presentation to ED. Son mentions that patient was talking in [place]. Per note, patient was not following commands in the ED. When I ask what her name was, patient responded [phone]. In ED, no family member was present and bedside to provide further history.     In [place] ED, blood pressure was 219/99, heart rate 101, temperature 99.1 °F, respiratory rate 26. CT/CTA head was done and showed Acute ischemic infarct in the right middle cerebral artery territory, Acute thrombosis of the M1 segment right middle cerebral artery with paucity of contrast in distal branches, Severe stenosis of the bilateral posterior cerebral artery P2 segments.  stroke was consulted and patient was not a candidate for TPA or embolectomy.  Therefore, patient was transferred to [facility] for admission to ICU.       Medications:     Scheduled Meds:   [Held by provider] predniSONE  7.5 mg Oral Daily    [Held by provider] clopidogrel  75 mg Oral Daily    [Held by provider] divalproex  250 mg Oral BID    [Held by provider] pantoprazole  40 mg Oral QAM AC  [Held by provider] levothyroxine  125 mcg Oral Daily    [Held by provider] traZODone  50 mg Oral Nightly    sodium chloride flush  10 mL Intravenous 2 times per day    [Held by provider] aspirin  81 mg Oral Daily    [Held by provider] atorvastatin  40 mg Oral Nightly    insulin lispro  0-12 Units Subcutaneous TID WC    insulin lispro  0-6 Units Subcutaneous Nightly    heparin (porcine)  5,000 Units Subcutaneous 3 times per day    ipratropium-albuterol  1 vial Inhalation 4x daily    insulin glargine  5 Units Subcutaneous Nightly     Continuous Infusions:   dextrose       PRN Meds:albuterol, sodium chloride flush, polyethylene glycol, promethazine **OR** ondansetron, acetaminophen **OR** acetaminophen, glucose, dextrose, glucagon (rDNA), dextrose, perflutren lipid microspheres, labetalol    Objective:   Vitals:   T-max:  Patient Vitals for the past 8 hrs:   BP Temp Temp src Pulse Resp SpO2 Weight   02/13/21 1210     29 97 %    02/13/21 1100 (!) 202/79   91 24     02/13/21 1000 (!) 184/93   95 19 92 %    02/13/21 0900 (!) 149/119   105 26 98 %    02/13/21 0856      99 %    02/13/21 0810 (!) 172/69 98.2 °F (36.8 °C) Oral 79 28     02/13/21 0720 (!) 229/82   92 23 99 %    02/13/21 0705 (!) 219/93   92 18     02/13/21 0630 (!) 193/89   94 18     02/13/21 0600 (!) 168/78   95 25 96 % 184 lb 11.9 oz (83.8 kg)   02/13/21 0530 (!) 178/77   89 20         Intake/Output Summary (Last 24 hours) at 2/13/2021 1317  Last data filed at 2/13/2021 0900  Gross per 24 hour   Intake 10 ml   Output    Net 10 ml       Review of Systems   Endocrine: Positive for cold intolerance. Unable to assess due to expressive aphasia    Physical Exam   Hypertensive , HR 90s, RR 20s, T 98.2  Constitutional:       Comments: -Obese, not in acute distress   HENT:      Head: Normocephalic and atraumatic.       Nose: Nose normal.      Mouth/Throat:      Mouth: Mucous membranes are moist. Eyes:      Extraocular Movements: Extraocular movements intact. Conjunctiva/sclera: Conjunctivae normal.      Pupils: Pupils are equal, round, and reactive to light. Neck:      Musculoskeletal: Normal range of motion and neck supple. Cardiovascular:      Rate and Rhythm: Normal rate and regular rhythm. Pulses: Normal pulses. Heart sounds: Normal heart sounds. Pulmonary:      Effort: Pulmonary effort is normal.      Breath sounds: CTAB bilateral      Comments: -Is on 2 L of oxygen. Abdominal:      Palpations: Abdomen is soft. Musculoskeletal: Normal range of motion. Neurological:      Comments: -Patient has expressive aphasia. Can follow simple commands. Can move bilateral upper and lower extremities. Expressive aphasia is present. Could not assess sensory deficit. LABS:    CBC:   Recent Labs     02/12/21 2035 02/13/21  0549   WBC 13.3* 10.1   HGB 14.6 14.0   HCT 44.3 43.5    313   MCV 87.4 88.6     Renal:    Recent Labs     02/12/21 2035 02/13/21  0549    140   K 4.2 3.8   CL 99 98*   CO2 26 27   BUN 18 21*   CREATININE 1.0 1.2   GLUCOSE 153* 250*   CALCIUM 10.2 9.7   MG  --  1.70*   ANIONGAP 15 15     Hepatic:   Recent Labs     02/12/21 2035   AST 21   ALT 12   BILITOT 0.4   PROT 7.7   LABALBU 4.7   ALKPHOS 91     Troponin:   Recent Labs     02/12/21 2035   TROPONINI <0.01     BNP: No results for input(s): BNP in the last 72 hours. Lipids: No results for input(s): CHOL, HDL in the last 72 hours. Invalid input(s): LDLCALCU, TRIGLYCERIDE  ABGs:  No results for input(s): PHART, CJG5ZLX, PO2ART, QHM4NMK, BEART, THGBART, M2RUOGNK, LZZ9PIA in the last 72 hours. INR:   Recent Labs     02/12/21 2035   INR 1.14     Lactate: No results for input(s): LACTATE in the last 72 hours.   Cultures:  -----------------------------------------------------------------  RAD:   CT head without contrast   Final Result Evolving right MCA territory infarct. No hemorrhage, herniation, or mass-   effect. MRI BRAIN WO CONTRAST    (Results Pending)         Assessment/Plan:   Paulina Mcclain is a 78 y.o. female with PMHx significant for COPD (on 5L of O2), CAD s/p stent, DM, Hypothyroidism, and HLD presented with altered mental status.      Ischemic stroke [facility]):  -Patient presented to ED with chief complaint of altered mental status. CT/CTA head was done and showed Acute ischemic infarct in the right middle cerebral artery territory, Acute thrombosis of the M1 segment right middle cerebral artery (appears radiology read may be an error given receptive and expressive aphasia deficits), Severe stenosis of the bilateral posterior cerebral artery P2 segments.  stroke was consulted and patient was not a candidate for TPA or embolectomy. -Repeat CT head  -Atorvastatin PO 40 mg qHs  Neuro checks q4h  Permissive hypertension for 24-48 hr  -PRN labetalol added for SBP >220/120, start BP medications tomorrow morning. Gradual reduction thereafter with eventual goal of less than 140/90 mmHg. -Lipid level  -Hemoglobin A1c level  -SLP, sips with meds until full SLP eval   -MRI pending, patient has medtronic pacemaker  -PT/OT  -Echo pending  Neurology consult     Hypertensive urgency:  -Patient presented to [facility] ED with blood pressure of 219/99. Currently, blood pressure is 176/99. Permissive hypertension for next 24-48 hrs  - start norvasc 5 mg pO qHS tonight      CHFpEF:  Patient has history of CHFpEF. Her last Echo was on 07/18/2016 with EF of > 71% and diastolic filling suggest grade I diastolic dysfunction.  -Lasix 20 mg     DM II:  -Last Hemoglobin A1c 6.2 (07/11/2020). Takes Metformin 500 mg twice daily at home. - Hold for now  Hemoglobin A1c level  MDSS     Hypothyroidism:  -Last TSH 1.45 (02/12/20210)  -Takes levothyroxine 125 MCG daily     COPD:  Patient has history of COPD. She is on 5 L of oxygen at home. -DuoNeb  Albuterol  -Prednisone 7.5 mg daily     CAD s/p stent:  -Aspirin  -Plavix     Psych:  -Depakote 250 twice daily, f/u depakote level  -Trazodone 15 mg    Code Status: full  FEN: NPO, awaiting slp eval.   PPX: protonix, SCD  DISPO: ICU, ok to transfer to Charu Lara MD, PGY-3  02/13/21  1:17 PM    This patient has been staffed and discussed with Dr. Desiree Greer MD.     [facility]    Patient seen and examined. I agree with Dr. Cecille Fritz history, physical, lab findings, assessment and plan.     Neurology consultation for CVA work-up  PT, OT, and speech therapy evaluation  Does not require ICU level of care -we will transfer to telemetry with every 4 hours neurochecks  Practice permissive hypertension for the next 24 to 48 hours  COPD at baseline  Check hemoglobin A1c and lipid profile    Desiree Greer MD

## 2021-02-14 LAB
ANION GAP SERPL CALCULATED.3IONS-SCNC: 12 MMOL/L (ref 3–16)
BASOPHILS ABSOLUTE: 0.1 K/UL (ref 0–0.2)
BASOPHILS RELATIVE PERCENT: 0.4 %
BUN BLDV-MCNC: 28 MG/DL (ref 7–20)
CALCIUM SERPL-MCNC: 9.4 MG/DL (ref 8.3–10.6)
CHLORIDE BLD-SCNC: 100 MMOL/L (ref 99–110)
CHOLESTEROL, TOTAL: 223 MG/DL (ref 0–199)
CO2: 28 MMOL/L (ref 21–32)
CREAT SERPL-MCNC: 1.1 MG/DL (ref 0.6–1.2)
EOSINOPHILS ABSOLUTE: 0 K/UL (ref 0–0.6)
EOSINOPHILS RELATIVE PERCENT: 0.1 %
ESTIMATED AVERAGE GLUCOSE: 128.4 MG/DL
GFR AFRICAN AMERICAN: 59
GFR NON-AFRICAN AMERICAN: 49
GLUCOSE BLD-MCNC: 118 MG/DL (ref 70–99)
GLUCOSE BLD-MCNC: 144 MG/DL (ref 70–99)
GLUCOSE BLD-MCNC: 159 MG/DL (ref 70–99)
GLUCOSE BLD-MCNC: 209 MG/DL (ref 70–99)
HBA1C MFR BLD: 6.1 %
HCT VFR BLD CALC: 40.5 % (ref 36–48)
HDLC SERPL-MCNC: 34 MG/DL (ref 40–60)
HEMOGLOBIN: 12.7 G/DL (ref 12–16)
LDL CHOLESTEROL CALCULATED: 130 MG/DL
LYMPHOCYTES ABSOLUTE: 2.4 K/UL (ref 1–5.1)
LYMPHOCYTES RELATIVE PERCENT: 18.3 %
MAGNESIUM: 2.4 MG/DL (ref 1.8–2.4)
MCH RBC QN AUTO: 28.1 PG (ref 26–34)
MCHC RBC AUTO-ENTMCNC: 31.4 G/DL (ref 31–36)
MCV RBC AUTO: 89.4 FL (ref 80–100)
MONOCYTES ABSOLUTE: 0.9 K/UL (ref 0–1.3)
MONOCYTES RELATIVE PERCENT: 7 %
NEUTROPHILS ABSOLUTE: 9.9 K/UL (ref 1.7–7.7)
NEUTROPHILS RELATIVE PERCENT: 74.2 %
PDW BLD-RTO: 14.5 % (ref 12.4–15.4)
PERFORMED ON: ABNORMAL
PLATELET # BLD: 270 K/UL (ref 135–450)
PMV BLD AUTO: 7.8 FL (ref 5–10.5)
POTASSIUM SERPL-SCNC: 3.8 MMOL/L (ref 3.5–5.1)
RBC # BLD: 4.53 M/UL (ref 4–5.2)
SODIUM BLD-SCNC: 140 MMOL/L (ref 136–145)
TRIGL SERPL-MCNC: 293 MG/DL (ref 0–150)
VLDLC SERPL CALC-MCNC: 59 MG/DL
WBC # BLD: 13.3 K/UL (ref 4–11)

## 2021-02-14 PROCEDURE — 6360000002 HC RX W HCPCS: Performed by: STUDENT IN AN ORGANIZED HEALTH CARE EDUCATION/TRAINING PROGRAM

## 2021-02-14 PROCEDURE — 83735 ASSAY OF MAGNESIUM: CPT

## 2021-02-14 PROCEDURE — 80061 LIPID PANEL: CPT

## 2021-02-14 PROCEDURE — 83036 HEMOGLOBIN GLYCOSYLATED A1C: CPT

## 2021-02-14 PROCEDURE — 2580000003 HC RX 258: Performed by: STUDENT IN AN ORGANIZED HEALTH CARE EDUCATION/TRAINING PROGRAM

## 2021-02-14 PROCEDURE — 94761 N-INVAS EAR/PLS OXIMETRY MLT: CPT

## 2021-02-14 PROCEDURE — 6370000000 HC RX 637 (ALT 250 FOR IP): Performed by: STUDENT IN AN ORGANIZED HEALTH CARE EDUCATION/TRAINING PROGRAM

## 2021-02-14 PROCEDURE — 92526 ORAL FUNCTION THERAPY: CPT

## 2021-02-14 PROCEDURE — 6370000000 HC RX 637 (ALT 250 FOR IP): Performed by: SURGERY

## 2021-02-14 PROCEDURE — 92507 TX SP LANG VOICE COMM INDIV: CPT

## 2021-02-14 PROCEDURE — 2060000000 HC ICU INTERMEDIATE R&B

## 2021-02-14 PROCEDURE — 85025 COMPLETE CBC W/AUTO DIFF WBC: CPT

## 2021-02-14 PROCEDURE — 36415 COLL VENOUS BLD VENIPUNCTURE: CPT

## 2021-02-14 PROCEDURE — 94640 AIRWAY INHALATION TREATMENT: CPT

## 2021-02-14 PROCEDURE — 80048 BASIC METABOLIC PNL TOTAL CA: CPT

## 2021-02-14 RX ORDER — LORAZEPAM 0.5 MG/1
0.5 TABLET ORAL EVERY 8 HOURS PRN
Status: DISCONTINUED | OUTPATIENT
Start: 2021-02-14 | End: 2021-02-15

## 2021-02-14 RX ORDER — ATORVASTATIN CALCIUM 80 MG/1
80 TABLET, FILM COATED ORAL DAILY
Status: DISCONTINUED | OUTPATIENT
Start: 2021-02-14 | End: 2021-02-17 | Stop reason: HOSPADM

## 2021-02-14 RX ORDER — HYDROCODONE BITARTRATE AND ACETAMINOPHEN 10; 325 MG/1; MG/1
1 TABLET ORAL EVERY 6 HOURS
Status: DISCONTINUED | OUTPATIENT
Start: 2021-02-14 | End: 2021-02-17 | Stop reason: HOSPADM

## 2021-02-14 RX ORDER — BUDESONIDE 0.5 MG/2ML
0.5 INHALANT ORAL 2 TIMES DAILY
Status: DISCONTINUED | OUTPATIENT
Start: 2021-02-14 | End: 2021-02-17 | Stop reason: HOSPADM

## 2021-02-14 RX ORDER — ROPINIROLE 0.25 MG/1
0.5 TABLET, FILM COATED ORAL NIGHTLY
Status: DISCONTINUED | OUTPATIENT
Start: 2021-02-14 | End: 2021-02-17 | Stop reason: HOSPADM

## 2021-02-14 RX ORDER — BUDESONIDE AND FORMOTEROL FUMARATE DIHYDRATE 160; 4.5 UG/1; UG/1
2 AEROSOL RESPIRATORY (INHALATION) 2 TIMES DAILY
Refills: 0 | Status: DISCONTINUED | OUTPATIENT
Start: 2021-02-14 | End: 2021-02-14 | Stop reason: CLARIF

## 2021-02-14 RX ORDER — LORAZEPAM 2 MG/ML
0.5 INJECTION INTRAMUSCULAR 3 TIMES DAILY
Status: DISCONTINUED | OUTPATIENT
Start: 2021-02-14 | End: 2021-02-14

## 2021-02-14 RX ORDER — ARFORMOTEROL TARTRATE 15 UG/2ML
15 SOLUTION RESPIRATORY (INHALATION) 2 TIMES DAILY
Status: DISCONTINUED | OUTPATIENT
Start: 2021-02-14 | End: 2021-02-17 | Stop reason: HOSPADM

## 2021-02-14 RX ADMIN — HYDROCODONE BITARTRATE AND ACETAMINOPHEN 1 TABLET: 10; 325 TABLET ORAL at 20:11

## 2021-02-14 RX ADMIN — DIVALPROEX SODIUM 250 MG: 250 TABLET, DELAYED RELEASE ORAL at 20:12

## 2021-02-14 RX ADMIN — IPRATROPIUM BROMIDE AND ALBUTEROL SULFATE 3 ML: .5; 3 SOLUTION RESPIRATORY (INHALATION) at 16:05

## 2021-02-14 RX ADMIN — BUDESONIDE INHALATION 500 MCG: 0.5 SUSPENSION RESPIRATORY (INHALATION) at 20:18

## 2021-02-14 RX ADMIN — Medication 10 ML: at 20:12

## 2021-02-14 RX ADMIN — IPRATROPIUM BROMIDE AND ALBUTEROL SULFATE 3 ML: .5; 3 SOLUTION RESPIRATORY (INHALATION) at 12:06

## 2021-02-14 RX ADMIN — ROPINIROLE HYDROCHLORIDE 0.5 MG: 0.25 TABLET, FILM COATED ORAL at 20:11

## 2021-02-14 RX ADMIN — Medication 10 ML: at 08:36

## 2021-02-14 RX ADMIN — BUDESONIDE INHALATION 500 MCG: 0.5 SUSPENSION RESPIRATORY (INHALATION) at 08:08

## 2021-02-14 RX ADMIN — HEPARIN SODIUM 5000 UNITS: 5000 INJECTION INTRAVENOUS; SUBCUTANEOUS at 07:18

## 2021-02-14 RX ADMIN — PANTOPRAZOLE SODIUM 40 MG: 40 TABLET, DELAYED RELEASE ORAL at 07:19

## 2021-02-14 RX ADMIN — ASPIRIN 81 MG: 81 TABLET, CHEWABLE ORAL at 08:31

## 2021-02-14 RX ADMIN — HEPARIN SODIUM 5000 UNITS: 5000 INJECTION INTRAVENOUS; SUBCUTANEOUS at 13:54

## 2021-02-14 RX ADMIN — LORAZEPAM 0.5 MG: 0.5 TABLET ORAL at 18:35

## 2021-02-14 RX ADMIN — LORAZEPAM 0.5 MG: 0.5 TABLET ORAL at 10:30

## 2021-02-14 RX ADMIN — INSULIN LISPRO 2 UNITS: 100 INJECTION, SOLUTION INTRAVENOUS; SUBCUTANEOUS at 08:37

## 2021-02-14 RX ADMIN — ARFORMOTEROL TARTRATE 15 MCG: 15 SOLUTION RESPIRATORY (INHALATION) at 08:08

## 2021-02-14 RX ADMIN — ARFORMOTEROL TARTRATE 15 MCG: 15 SOLUTION RESPIRATORY (INHALATION) at 20:18

## 2021-02-14 RX ADMIN — INSULIN GLARGINE 5 UNITS: 100 INJECTION, SOLUTION SUBCUTANEOUS at 20:13

## 2021-02-14 RX ADMIN — INSULIN LISPRO 2 UNITS: 100 INJECTION, SOLUTION INTRAVENOUS; SUBCUTANEOUS at 12:29

## 2021-02-14 RX ADMIN — LORAZEPAM 0.5 MG: 0.5 TABLET ORAL at 02:28

## 2021-02-14 RX ADMIN — PREDNISONE 7.5 MG: 5 TABLET ORAL at 08:30

## 2021-02-14 RX ADMIN — IPRATROPIUM BROMIDE AND ALBUTEROL SULFATE 3 ML: .5; 3 SOLUTION RESPIRATORY (INHALATION) at 08:08

## 2021-02-14 RX ADMIN — INSULIN LISPRO 2 UNITS: 100 INJECTION, SOLUTION INTRAVENOUS; SUBCUTANEOUS at 20:13

## 2021-02-14 RX ADMIN — LEVOTHYROXINE SODIUM 125 MCG: 125 TABLET ORAL at 08:32

## 2021-02-14 RX ADMIN — TRAZODONE HYDROCHLORIDE 50 MG: 50 TABLET ORAL at 20:11

## 2021-02-14 RX ADMIN — IPRATROPIUM BROMIDE AND ALBUTEROL SULFATE 3 ML: .5; 3 SOLUTION RESPIRATORY (INHALATION) at 20:18

## 2021-02-14 RX ADMIN — CLOPIDOGREL BISULFATE 75 MG: 75 TABLET ORAL at 08:32

## 2021-02-14 RX ADMIN — HEPARIN SODIUM 5000 UNITS: 5000 INJECTION INTRAVENOUS; SUBCUTANEOUS at 20:13

## 2021-02-14 RX ADMIN — LORAZEPAM 0.5 MG: 2 INJECTION INTRAMUSCULAR; INTRAVENOUS at 13:51

## 2021-02-14 RX ADMIN — LISINOPRIL 40 MG: 40 TABLET ORAL at 08:32

## 2021-02-14 ASSESSMENT — PAIN - FUNCTIONAL ASSESSMENT: PAIN_FUNCTIONAL_ASSESSMENT: ACTIVITIES ARE NOT PREVENTED

## 2021-02-14 ASSESSMENT — PAIN SCALES - GENERAL: PAINLEVEL_OUTOF10: 4

## 2021-02-14 ASSESSMENT — PAIN DESCRIPTION - PROGRESSION: CLINICAL_PROGRESSION: NOT CHANGED

## 2021-02-14 ASSESSMENT — PAIN SCALES - WONG BAKER: WONGBAKER_NUMERICALRESPONSE: 0

## 2021-02-14 ASSESSMENT — PAIN DESCRIPTION - ONSET: ONSET: ON-GOING

## 2021-02-14 NOTE — PROGRESS NOTES
Speech Language Pathology  Facility/Department: [facility] ICU  Dysphagia Daily Treatment Note    NAME: Betty Ley  : 1945  MRN: [id number]    Patient Diagnosis(es):   Patient Active Problem List    Diagnosis Date Noted    Acute-on-chronic respiratory failure ([facility] 2012     Priority: High    Pneumonia 01/10/2012     Priority: High    Hyperkalemia 2012     Priority: Medium    Sepsis ([facility] 2012     Priority: Medium    Chronic obstructive pulmonary disease (HCC)      Priority: Medium    CAD (coronary artery disease)      Priority: Medium    Type II diabetes mellitus, well controlled ([facility]      Priority: Low    Acute ischemic stroke ([facility] 2021    Chronic bilateral low back pain without sciatica     Chronic ischemic heart disease     Pacemaker     Chronic respiratory failure with hypoxia ([facility]     MDD (major depressive disorder), single episode 2020    Chest pain 2016    Chest pressure     Shortness of breath     History of coronary artery disease     CAP (community acquired pneumonia)     Pleural effusion 2012     Allergies: Allergies   Allergen Reactions    Buspar [Buspirone Hcl]            CXR (21)  Apical predominant emphysema.       No acute cardiopulmonary disease. Previous MBS  None    Chart reviewed.     Medical Diagnosis: CVA  Treatment Diagnosis: dysphagia    BSE Impression (21)  Dysphagia Diagnosis: Mild oral stage dysphagia Dysphagia Impression : Patient presents with mild oropharyngeal dysphagia. Unable to complete oral motor exam d/t receptive aphasia. With pt positioned upright in bed, analyzed tolerance ice chips, thins via tsp/straw/cup (3oz), puree, and soft solid. Pt with positive oral acceptance, mildly prolonged mastication, positive swallow movement, good oral clearance, no overt signs of aspiration or penetration. Pt noted to be somewhat impulsive with straw, and commented \"that almost went down wrong! \". Recommend dysphagia 2 minced & moist solids / thins via cup (avoid straw), meds in puree; as tolerated. Will continue to follow. MBS results  Pt not appropriate to participate at this time given absent direction following and limited PO intake    Pain: none indicated via any means    Current Diet :  Dysphagia minced and moist / thin liquids    Treatment:  Pt seen bedside to address the following goals:  Goal 1: Patient will tolerate least restrictive diet without overt signs of aspiration or associated decline in respiratory status. 2/14: Educated pt to role of SLP, purpose of visit, importance of PO intake for recovery s/p CVA, concerns for dysphagia, and risks of choking / aspiration. Pt demonstrates no comprehension. D/C GOAL - not indicated given pt's cognitive lingusitic status. Patient/Family/Caregiver Education:  As above    Compensatory Strategies:  No straws  Alternate solids and liquids  Upright as possible for all oral intake  Small bites/sips  Eat/Feed slowly  1:1 feed       Plan:  Continued daily Dysphagia treatment with goals per  plan of care. Diet recommendations: continue dysphagia minced and moist + thins (no straws) - downgrade to puree or full liquid diet if difficulty with solids persists    1:1 feed recommended d/t pt's cognitive-linguistic impairments    DC recommendation: ongoing tx indicated at this time  Treatment: 15  D/W nursing, [facility]  Needs met prior to leaving room, call button in reach.     Maribeth Jordan MA CCC-SLP; [id number]  Speech-Language Pathologist  Pager # [phone]  Phone # [phone]  Office # [phone]    If patient is discharged prior to next treatment, this note will serve as the discharge summary

## 2021-02-14 NOTE — PROGRESS NOTES
Patient is alert and occasionally oriented to self. Patient has expressive and receptive aphasia, not following commands. Vitals are stable. Patient tolerated a.m. pills crushed in applesauce and taking small bites of PO, tolerating well. Patient is anxious and fidgeting in the bed, occasionally attempts to get out of bed. Dr. Michael Wright notified and is aware, 0.5 mg IV Ativan given per orders. Will continue to monitor.

## 2021-02-14 NOTE — PROGRESS NOTES
Asked by nursing for medication to help calm patient down as she has become irate and tearful. There is concern that she may be withdrawing from home medications. I was able to confirm with patient's daughter that she takes ativan 0.5 mg PO TID and norco 10 mg PO every 6 hours for chronic pain issues and anxiety. This was confirmed in [address] monitoring and she has picked up scripts as recently as 2/2/2021. Home ativan and norco were re-ordered.

## 2021-02-14 NOTE — PROGRESS NOTES
Physical Therapy Attempt Note    PT eval order received and chart reviewed. Pt agitated and nursing states that she is also not following commands. She was just given ativan and nursing requests the pt be held until they can improve her medications for mood (hx of bipolar). PT to hold and try back at later date.   Yvan Fritzt, DPT

## 2021-02-14 NOTE — PLAN OF CARE
Problem: Falls - Risk of:  Goal: Will remain free from falls  Description: Will remain free from falls  Patient is free from falls. Fall precautions are in place. Bed is locked and in lowest position, side rails are up x 3, bed alarm is on, nonskid socks are on, bedside table and call light are within reach. Will continue to monitor. 2/14/2021 1446 by Yamel Page RN  Outcome: Ongoing    Problem: Skin Integrity:  Goal: Absence of new skin breakdown  Description: Absence of new skin breakdown  No skin breakdown noted, patient turns self frequently in bed. Sacral heart is in place for prevention of breakdown. Will continue to monitor.   Outcome: Ongoing

## 2021-02-14 NOTE — PROGRESS NOTES
Physician Progress Note      Alexx Omer  CSN #:                  [id number]  :                       1945  ADMIT DATE:       2021 1:19 AM  DISCH DATE:  RESPONDING  PROVIDER #:        Michaelle Boyle MD          QUERY TEXT:    Pt admitted with ischemic stroke and has altered mental status documented. If   possible, please document in progress notes and discharge summary further   specificity regarding the type of encephalopathy:    The medical record reflects the following:  Risk Factors: 79 yo w/ AMS, found to have ischemic stroke  Clinical Indicators: Per H&P: found to have altered mental status. Son   mentions that patient was talking in [place]. Per note, patient was not   following commands in the ED. Treatment: head CT, CTA, neuro consult  Options provided:  -- Encephalopathy due to ischemic stroke  -- Other - I will add my own diagnosis  -- Disagree - Not applicable / Not valid  -- Disagree - Clinically unable to determine / Unknown  -- Refer to Clinical Documentation Reviewer    PROVIDER RESPONSE TEXT:    This patient has encephalopathy due to ischemic stroke.     Query created by: Juliana Garcia on 2021 9:50 AM      Electronically signed by:  Michaelle Boyle MD 2021 1:44 PM

## 2021-02-14 NOTE — PROGRESS NOTES
2/14: 0% accuracy for contextual single step directions despite MAX cues. Continue goal.    Goal 3: Patient will complete naming tasks with 70% accuracy given max cues. 2/14: 0% accuracy for naming objects despite MAX cues. Pt 0% accurate for stating name despite max cues. Pt appeared to have severely impaired object recognition this date with inappropriate use of utensils, sticking fingers in liquids vs drinking, etc.     Goal 4: Patient will answer yes/no questions with 60% accuracy. 2/14: <25% accuracy despite MAX cues. Continue goal.    Goal 5: Patient will tolerate ongoing cognitive/linguistic assessment. 2/14: Pt with moderately impaired joint attention which appeared exacerbated by agitation, continuing to make eye contact with SLP and not attending to objects being discussed despite MAX cues intermittently. Continue goal.    Education:  Attempted to educate pt to role of SLP, purpose of visit, rationale for assessment, and communication deficits s/p CVA. Assessment: Severe fluent aphasia with absent direction following, poor object awareness / recognition, and inability at this time to answer even personal Y / N questions. Limited at times by agitation and emotional lability with reduced joint attention. Plan:  Continue speech/language therapy to address above goals, 3-5 x/week x LOS  DC recommendations: ongoing ST recommended    D/W nursing, [facility]  Needs met prior to leaving room, call button in reach. Treatment time: 15 min speech    Vance Simon MA CCC-SLP; .[id number]  Speech-Language Pathologist  Pager # [phone]  Phone # [phone]  Office # [phone]    If patient is discharged prior to next session, this note will serve as discharge summary.

## 2021-02-14 NOTE — PLAN OF CARE
Problem: Skin Integrity:  Goal: Will show no infection signs and symptoms  Description: Will show no infection signs and symptoms  2/14/2021 0802 by Trice Arce RN  Note: Skin intact, turn every 2 hours, assisted to position of comfort       Problem: Falls - Risk of:  Goal: Will remain free from falls  Description: Will remain free from falls  2/14/2021 0802 by Trice Arce RN  Note: Patient has difficulty following commands, can be impulsive, free from injury or falls at this time, fall precautions in place, bed in low position, side rail up x2, Vazquez Fall Risk: High (45 and higher), bed alarm on, reoriented to room and call light, reminded not to get up without assistance, call light in reach, will continue to monitor.       Problem: HEMODYNAMIC STATUS  Goal: Patient has stable vital signs and fluid balance  2/14/2021 0802 by Trice Arce RN  Note:   Vitals:    02/14/21 0800   BP: (!) 161/63   Pulse: 80   Resp: 18   Temp: 98 °F (36.7 °C)   SpO2: 99%

## 2021-02-14 NOTE — PROGRESS NOTES
Pt arrived to room 55 at this time. VSS. Pt drowsy, but rouses to voice and touch. Does not respond to questions Pt  Pulse ox applied to finger to fulfill continuous pulse ox order. Pt repositioned to left side with pillow support. Both heels elevated off bed on two pillows. Pavan Martin, RT, at bedside at this time to administer breathing treatment. Bed locked, lowered, alarm engaged. Call light in reach. Sitter outside room.

## 2021-02-14 NOTE — PLAN OF CARE
Problem: Skin Integrity:  Goal: Will show no infection signs and symptoms  Description: Will show no infection signs and symptoms  Outcome: Met This Shift     Problem: Skin Integrity:  Goal: Absence of new skin breakdown  Description: Absence of new skin breakdown  Outcome: Met This Shift     Problem: Falls - Risk of:  Goal: Will remain free from falls  Description: Will remain free from falls  Outcome: Met This Shift     Problem: Falls - Risk of:  Goal: Absence of physical injury  Description: Absence of physical injury  Outcome: Met This Shift     Problem: HEMODYNAMIC STATUS  Goal: Patient has stable vital signs and fluid balance  Outcome: Met This Shift     Problem: COMMUNICATION IMPAIRMENT  Goal: Ability to express needs and understand communication  Outcome: Ongoing

## 2021-02-15 LAB
ANION GAP SERPL CALCULATED.3IONS-SCNC: 10 MMOL/L (ref 3–16)
BASOPHILS ABSOLUTE: 0 K/UL (ref 0–0.2)
BASOPHILS RELATIVE PERCENT: 0.4 %
BUN BLDV-MCNC: 25 MG/DL (ref 7–20)
CALCIUM SERPL-MCNC: 9.7 MG/DL (ref 8.3–10.6)
CHLORIDE BLD-SCNC: 101 MMOL/L (ref 99–110)
CO2: 32 MMOL/L (ref 21–32)
CREAT SERPL-MCNC: 1.3 MG/DL (ref 0.6–1.2)
EOSINOPHILS ABSOLUTE: 0.1 K/UL (ref 0–0.6)
EOSINOPHILS RELATIVE PERCENT: 0.9 %
GFR AFRICAN AMERICAN: 48
GFR NON-AFRICAN AMERICAN: 40
GLUCOSE BLD-MCNC: 117 MG/DL (ref 70–99)
GLUCOSE BLD-MCNC: 127 MG/DL (ref 70–99)
GLUCOSE BLD-MCNC: 131 MG/DL (ref 70–99)
GLUCOSE BLD-MCNC: 165 MG/DL (ref 70–99)
HCT VFR BLD CALC: 41.3 % (ref 36–48)
HEMOGLOBIN: 13.3 G/DL (ref 12–16)
LYMPHOCYTES ABSOLUTE: 2.6 K/UL (ref 1–5.1)
LYMPHOCYTES RELATIVE PERCENT: 28.8 %
MAGNESIUM: 2.1 MG/DL (ref 1.8–2.4)
MCH RBC QN AUTO: 28.5 PG (ref 26–34)
MCHC RBC AUTO-ENTMCNC: 32.3 G/DL (ref 31–36)
MCV RBC AUTO: 88.3 FL (ref 80–100)
MONOCYTES ABSOLUTE: 0.9 K/UL (ref 0–1.3)
MONOCYTES RELATIVE PERCENT: 9.4 %
NEUTROPHILS ABSOLUTE: 5.5 K/UL (ref 1.7–7.7)
NEUTROPHILS RELATIVE PERCENT: 60.5 %
PDW BLD-RTO: 14.5 % (ref 12.4–15.4)
PERFORMED ON: ABNORMAL
PLATELET # BLD: 267 K/UL (ref 135–450)
PMV BLD AUTO: 7.9 FL (ref 5–10.5)
POTASSIUM SERPL-SCNC: 3.7 MMOL/L (ref 3.5–5.1)
RBC # BLD: 4.68 M/UL (ref 4–5.2)
SODIUM BLD-SCNC: 143 MMOL/L (ref 136–145)
WBC # BLD: 9.1 K/UL (ref 4–11)

## 2021-02-15 PROCEDURE — 6370000000 HC RX 637 (ALT 250 FOR IP): Performed by: STUDENT IN AN ORGANIZED HEALTH CARE EDUCATION/TRAINING PROGRAM

## 2021-02-15 PROCEDURE — 94640 AIRWAY INHALATION TREATMENT: CPT

## 2021-02-15 PROCEDURE — 97116 GAIT TRAINING THERAPY: CPT

## 2021-02-15 PROCEDURE — 6360000002 HC RX W HCPCS: Performed by: STUDENT IN AN ORGANIZED HEALTH CARE EDUCATION/TRAINING PROGRAM

## 2021-02-15 PROCEDURE — 80048 BASIC METABOLIC PNL TOTAL CA: CPT

## 2021-02-15 PROCEDURE — 83735 ASSAY OF MAGNESIUM: CPT

## 2021-02-15 PROCEDURE — 2580000003 HC RX 258: Performed by: STUDENT IN AN ORGANIZED HEALTH CARE EDUCATION/TRAINING PROGRAM

## 2021-02-15 PROCEDURE — 97162 PT EVAL MOD COMPLEX 30 MIN: CPT

## 2021-02-15 PROCEDURE — 92507 TX SP LANG VOICE COMM INDIV: CPT

## 2021-02-15 PROCEDURE — 97530 THERAPEUTIC ACTIVITIES: CPT

## 2021-02-15 PROCEDURE — 97166 OT EVAL MOD COMPLEX 45 MIN: CPT

## 2021-02-15 PROCEDURE — 85025 COMPLETE CBC W/AUTO DIFF WBC: CPT

## 2021-02-15 PROCEDURE — 94664 DEMO&/EVAL PT USE INHALER: CPT

## 2021-02-15 PROCEDURE — 36415 COLL VENOUS BLD VENIPUNCTURE: CPT

## 2021-02-15 PROCEDURE — 94761 N-INVAS EAR/PLS OXIMETRY MLT: CPT

## 2021-02-15 PROCEDURE — 2060000000 HC ICU INTERMEDIATE R&B

## 2021-02-15 PROCEDURE — 92526 ORAL FUNCTION THERAPY: CPT

## 2021-02-15 RX ORDER — LORAZEPAM 2 MG/ML
0.5 INJECTION INTRAMUSCULAR EVERY 8 HOURS PRN
Status: DISCONTINUED | OUTPATIENT
Start: 2021-02-15 | End: 2021-02-17 | Stop reason: HOSPADM

## 2021-02-15 RX ORDER — AMLODIPINE BESYLATE 5 MG/1
5 TABLET ORAL DAILY
Status: DISCONTINUED | OUTPATIENT
Start: 2021-02-15 | End: 2021-02-17 | Stop reason: HOSPADM

## 2021-02-15 RX ORDER — SODIUM CHLORIDE 9 MG/ML
INJECTION, SOLUTION INTRAVENOUS CONTINUOUS
Status: DISCONTINUED | OUTPATIENT
Start: 2021-02-15 | End: 2021-02-16

## 2021-02-15 RX ADMIN — LORAZEPAM 0.5 MG: 2 INJECTION INTRAMUSCULAR; INTRAVENOUS at 17:42

## 2021-02-15 RX ADMIN — ASPIRIN 81 MG: 81 TABLET, CHEWABLE ORAL at 09:43

## 2021-02-15 RX ADMIN — IPRATROPIUM BROMIDE AND ALBUTEROL SULFATE 3 ML: .5; 3 SOLUTION RESPIRATORY (INHALATION) at 21:30

## 2021-02-15 RX ADMIN — IPRATROPIUM BROMIDE AND ALBUTEROL SULFATE 3 ML: .5; 3 SOLUTION RESPIRATORY (INHALATION) at 15:06

## 2021-02-15 RX ADMIN — LORAZEPAM 0.5 MG: 0.5 TABLET ORAL at 13:09

## 2021-02-15 RX ADMIN — BUDESONIDE INHALATION 500 MCG: 0.5 SUSPENSION RESPIRATORY (INHALATION) at 07:31

## 2021-02-15 RX ADMIN — Medication 10 ML: at 23:13

## 2021-02-15 RX ADMIN — Medication 10 ML: at 09:56

## 2021-02-15 RX ADMIN — SODIUM CHLORIDE: 9 INJECTION, SOLUTION INTRAVENOUS at 10:53

## 2021-02-15 RX ADMIN — BUDESONIDE INHALATION 500 MCG: 0.5 SUSPENSION RESPIRATORY (INHALATION) at 21:30

## 2021-02-15 RX ADMIN — HEPARIN SODIUM 5000 UNITS: 5000 INJECTION INTRAVENOUS; SUBCUTANEOUS at 05:56

## 2021-02-15 RX ADMIN — TRAZODONE HYDROCHLORIDE 50 MG: 50 TABLET ORAL at 23:07

## 2021-02-15 RX ADMIN — ROPINIROLE HYDROCHLORIDE 0.5 MG: 0.25 TABLET, FILM COATED ORAL at 23:07

## 2021-02-15 RX ADMIN — CLOPIDOGREL BISULFATE 75 MG: 75 TABLET ORAL at 09:43

## 2021-02-15 RX ADMIN — PANTOPRAZOLE SODIUM 40 MG: 40 TABLET, DELAYED RELEASE ORAL at 05:56

## 2021-02-15 RX ADMIN — INSULIN GLARGINE 5 UNITS: 100 INJECTION, SOLUTION SUBCUTANEOUS at 23:12

## 2021-02-15 RX ADMIN — ARFORMOTEROL TARTRATE 15 MCG: 15 SOLUTION RESPIRATORY (INHALATION) at 07:31

## 2021-02-15 RX ADMIN — DIVALPROEX SODIUM 250 MG: 250 TABLET, DELAYED RELEASE ORAL at 23:07

## 2021-02-15 RX ADMIN — IPRATROPIUM BROMIDE AND ALBUTEROL SULFATE 3 ML: .5; 3 SOLUTION RESPIRATORY (INHALATION) at 07:31

## 2021-02-15 RX ADMIN — ARFORMOTEROL TARTRATE 15 MCG: 15 SOLUTION RESPIRATORY (INHALATION) at 21:30

## 2021-02-15 RX ADMIN — HEPARIN SODIUM 5000 UNITS: 5000 INJECTION INTRAVENOUS; SUBCUTANEOUS at 23:07

## 2021-02-15 RX ADMIN — IPRATROPIUM BROMIDE AND ALBUTEROL SULFATE 3 ML: .5; 3 SOLUTION RESPIRATORY (INHALATION) at 11:41

## 2021-02-15 ASSESSMENT — PAIN SCALES - PAIN ASSESSMENT IN ADVANCED DEMENTIA (PAINAD)
FACIALEXPRESSION: 1
CONSOLABILITY: 1
BODYLANGUAGE: 0
TOTALSCORE: 3

## 2021-02-15 ASSESSMENT — PAIN SCALES - GENERAL: PAINLEVEL_OUTOF10: 6

## 2021-02-15 NOTE — CARE COORDINATION
CM attempted f2f with patient. Patient not able to answer questions appropriately. Therapy recs pending. CM placed call to daughter Laith Canela [phone]. Laith Canela stated family is agreeable to placement if necessary. Requests [facility] or [facility] [facility] if SNF level of care is needed. CM will continue to follow for discharge plans after therapy recs are placed.   Cat Claudio RN  RN Case Manager  [phone]

## 2021-02-15 NOTE — PLAN OF CARE
Problem: Skin Integrity:  Goal: Will show no infection signs and symptoms  Description: Will show no infection signs and symptoms  Outcome: Ongoing   Patient turned and repositioned every two hours. Barrier cream used on bottom. Sacral heart on buttocks. Heels elevated off of bed. Skin thoroughly assessed. Will continue to monitor. Problem: Falls - Risk of:  Goal: Will remain free from falls  Description: Will remain free from falls  2/15/2021 0326 by Cherie Friedman RN  Outcome: Ongoing   Patient has remained free of falls. 2/4 bed rails up, bed locked and in lowest position, call light within reach. Patient instructed on use of call light and uses appropriately. Bed alarm on. Non-skid footwear and fall band on. Will continue to monitor.

## 2021-02-15 NOTE — PROGRESS NOTES
Progress Note  The patient is being evaluated for aphasia    Updates  Unable to have MRI due to pacemaker compatability. Continues to be aphasic. No acute events overnight.  Was given PRN ativan for agitation per RN    Active Ambulatory Problems     Diagnosis Date Noted    Pneumonia 01/10/2012    Chronic obstructive pulmonary disease (HCC)     [facility]     CAD (coronary artery disease)     Acute-on-chronic respiratory failure ([facility] 01/12/2012    Sepsis ([address] 01/13/2012    Pleural effusion 01/14/2012    Hyperkalemia 01/14/2012    CAP (community acquired pneumonia)     Chest pain 07/18/2016    Chest pressure     Shortness of breath     History of coronary artery disease     MDD (major depressive disorder), single episode 07/09/2020    Chronic ischemic heart disease     Pacemaker     [facility] respiratory failure with hypoxia [facility])     Chronic bilateral low back pain without sciatica      Resolved Ambulatory Problems     Diagnosis Date Noted    Dysuria     Cough     ANA (acute kidney injury) ([address]      Past Medical History:   Diagnosis Date    Arthritis     Bipolar depression [facility]     CHF (congestive heart failure) ([facility])     COPD (chronic obstructive pulmonary disease) ([facility]     Depression     Diabetes mellitus ([address]     Encounter for imaging to screen for metal prior to MRI 02/15/2021    Hyperlipidemia     Oxygen dependent     Thyroid disease     Unspecified cerebral artery occlusion with cerebral infarction        Current Facility-Administered Medications:     amLODIPine (NORVASC) tablet 5 mg, 5 mg, Oral, Daily, Daisy Solorzano MD    LORazepam (ATIVAN) tablet 0.5 mg, 0.5 mg, Oral, Q8H PRN, Ekaterina Leo MD, 0.5 mg at 02/14/21 3887    budesonide (PULMICORT) nebulizer suspension 500 mcg, 0.5 mg, Nebulization, BID, Daisy Solorzano MD, 500 mcg at 02/15/21 4892   Arformoterol Tartrate (BROVANA) nebulizer solution 15 mcg, 15 mcg, Nebulization, BID, García Sanchez MD, 15 mcg at 02/15/21 0731    rOPINIRole (REQUIP) tablet 0.5 mg, 0.5 mg, Oral, Nightly, Evita Garcia MD, 0.5 mg at 02/14/21 2011    HYDROcodone-acetaminophen (NORCO)  MG per tablet 1 tablet, 1 tablet, Oral, D9R, Evita Garcia MD, 1 tablet at 02/14/21 2011    atorvastatin (LIPITOR) tablet 80 mg, 80 mg, Oral, Daily, Evita Garcia MD    albuterol (PROVENTIL) nebulizer solution 2.5 mg, 2.5 mg, Nebulization, H7F PRN, Evita Garcia MD    sodium chloride flush 0.9 % injection 10 mL, 10 mL, Intravenous, 2 times per day, Evita Garcia MD, 10 mL at 02/15/21 0956    sodium chloride flush 0.9 % injection 10 mL, 10 mL, Intravenous, PRN, Evita Garcia MD    polyethylene glycol (GLYCOLAX) packet 17 g, 17 g, Oral, Daily PRN, Evita Garcia MD    promethazine (PHENERGAN) tablet 12.5 mg, 12.5 mg, Oral, Q6H PRN **OR** ondansetron (ZOFRAN) injection 4 mg, 4 mg, Intravenous, J4I PRN, Evita Garcia MD    acetaminophen (TYLENOL) tablet 650 mg, 650 mg, Oral, Q6H PRN **OR** acetaminophen (TYLENOL) suppository 650 mg, 650 mg, Rectal, O8J PRN, Evita Garcia MD    insulin lispro (1 Unit Dial) 0-12 Units, 0-12 Units, Subcutaneous, TID WC, Evita Garcia MD, 2 Units at 02/14/21 1229    insulin lispro (1 Unit Dial) 0-6 Units, 0-6 Units, Subcutaneous, Nightly, Evita Garcia MD, 2 Units at 02/14/21 2013    glucose (GLUTOSE) 40 % oral gel 15 g, 15 g, Oral, PRN, Carmen Ayala MD    dextrose 50 % IV solution, 12.5 g, Intravenous, PRN, Carmen Ayala MD    glucagon (rDNA) injection 1 mg, 1 mg, Intramuscular, PRN, Evita Garcia MD    dextrose 5 % solution, 100 mL/hr, Intravenous, PRN, Carmen Ayala MD    perflutren lipid microspheres (DEFINITY) injection 1.65 mg, 1.5 mL, Intravenous, ONCE PRN, Evita Garcia MD   heparin (porcine) injection 5,000 Units, 5,000 Units, Subcutaneous, 3 times per day, Thierry Baugh MD, 7,245 Units at 02/15/21 0556    ipratropium-albuterol (DUONEB) nebulizer solution 3 mL, 1 vial, Inhalation, 4x daily, Thierry Baugh MD, 3 mL at 02/15/21 0731    insulin glargine (LANTUS;BASAGLAR) injection pen 5 Units, 5 Units, Subcutaneous, Nightly, Thierry Baugh MD, 5 Units at 02/14/21 2013    labetalol (NORMODYNE;TRANDATE) injection 10 mg, 10 mg, Intravenous, E1C PRN, Thierry Baugh MD    aspirin chewable tablet 81 mg, 81 mg, Oral, Daily, Thierry Baugh MD, 81 mg at 02/15/21 0943    clopidogrel (PLAVIX) tablet 75 mg, 75 mg, Oral, Daily, Thierry Baugh MD, 75 mg at 02/15/21 0943    levothyroxine (SYNTHROID) tablet 125 mcg, 125 mcg, Oral, Daily, Thierry Baugh MD, 584 mcg at 02/14/21 0832    pantoprazole (PROTONIX) tablet 40 mg, 40 mg, Oral, QAM AC, Thierry Baugh MD, 40 mg at 02/15/21 0556    predniSONE (DELTASONE) tablet 7.5 mg, 7.5 mg, Oral, Daily, Thierry Baugh MD, 7.5 mg at 02/14/21 0830    traZODone (DESYREL) tablet 50 mg, 50 mg, Oral, Nightly, Thierry Baugh MD, 50 mg at 02/14/21 2011    divalproex (DEPAKOTE) DR tablet 250 mg, 250 mg, Oral, Nightly, León Bryan MD, 250 mg at 02/14/21 2012    [Held by provider] lisinopril (PRINIVIL;ZESTRIL) tablet 40 mg, 40 mg, Oral, Daily, Sulema Deleon MD, 40 mg at 02/14/21 0832      ROS - limited given global aphasia.       amLODIPine  5 mg Oral Daily    budesonide  0.5 mg Nebulization BID    Arformoterol Tartrate  15 mcg Nebulization BID    rOPINIRole  0.5 mg Oral Nightly    HYDROcodone-acetaminophen  1 tablet Oral Q6H    atorvastatin  80 mg Oral Daily    sodium chloride flush  10 mL Intravenous 2 times per day    insulin lispro  0-12 Units Subcutaneous TID WC    insulin lispro  0-6 Units Subcutaneous Nightly    heparin (porcine)  5,000 Units Subcutaneous 3 times per day    ipratropium-albuterol  1 vial Inhalation 4x daily  insulin glargine  5 Units Subcutaneous Nightly    aspirin  81 mg Oral Daily    clopidogrel  75 mg Oral Daily    levothyroxine  125 mcg Oral Daily    pantoprazole  40 mg Oral QAM AC    predniSONE  7.5 mg Oral Daily    traZODone  50 mg Oral Nightly    divalproex  250 mg Oral Nightly    [Held by provider] lisinopril  40 mg Oral Daily         dextrose          LORazepam, albuterol, sodium chloride flush, polyethylene glycol, promethazine **OR** ondansetron, acetaminophen **OR** acetaminophen, glucose, dextrose, glucagon (rDNA), dextrose, perflutren lipid microspheres, labetalol     Exam:  Blood pressure 106/70, pulse 88, temperature 98.1 °F (36.7 °C), temperature source Oral, resp. rate 16, weight 184 lb 11.9 oz (83.8 kg), SpO2 94 %. Constitutional    Vital signs: BP, HR, and RR reviewed   General Alert, no distress, well-nourished  Eyes: unable to visualize the fundi  Cardiovascular: pulses symmetric in all 4 extremities. No peripheral edema. Psychiatric: no sign of agitation. Neurologic  Mental status:   orientation limited given global aphasia   General fund of knowledge:   limited given global aphasia   Memory:  limited given global aphasia   Attention:   limited given global aphasia     Language: globally aphasic. Comprehension  limited given global aphasia. Is unable to follow commands. Cranial nerves:   CN2: Visual Fields full w/o extinction on confrontational testing with blink to threat. CN 3,4,6: extraocular muscles intact as she tracks examiner. CARLOS. CN5: V1: V2: V3:  limited given global aphasia  CN7:  limited given global aphasia. Appears to have mild left facial weakness. Verbal attempts are incomprehensible. CN8:  limited given global aphasia  CN9/10:  limited given global aphasia  CN11:  limited given global aphasia  CN12:  limited given global aphasia  Strength:   RUE: 2/5 purposeful movements observed. + drift on arm drop. LUE: 2/5 purposeful movement observed. Able to sustain antigravity on arm drop. BLE: 1/5 gross purposeful movement observed  Sensory: limited given global aphasia. She does react to pain in BLE. Cerebellar/coordination:  limited given global aphasia  Tone: normal in all 4 extremities  Gait: deferred for safety. Labs  Na: 143  K: 3.7  BUN: 25  Creatinine: 1.3  Glucose: 131  Ca: 9.7    LDL: 130  HbA1c: 6.1  TSH: 1.45    WBC: 9.1  RBC: 4.68  Hgb: 13.3  Hct: 41.3  Platelet: 255    Covid 19: Not detected. UA: Negative for nitrites and leukocyte esterase. 6-10 hyaline casts, 1+ mucous. 0-2 WBC. No reflex to culture. Studies  CT Head w/o 2/13/21: Evolving right MCA territory infarct. No hemorrhage, herniation, or mass- effect. CT Head w/o 2/12/21: 1. Acute ischemic infarct in the right middle cerebral artery territory. No associated mass effect or intracranial hemorrhage. 6. Chronic infarcts in the right basal ganglia, right thalamus, and posterior right temporal lobe. CTA Head & Neck w/ 2/12/21:  Acute thrombosis of the M1 segment right middle cerebral artery with paucity of contrast in distal branches. Severe stenosis of the bilateral posterior cerebral artery P2 segments. 50% atherosclerotic stenosis of the proximal internal carotid arteries. 5. No vertebral artery stenosis. Severe emphysema. TTE 2/13/21:  Left ventricular cavity size is normal. There is asymmetric hypertrophy of   the basal septum. Left ventricular function is hyperdynamic with ejection   fraction estimated at 65-70%. No regional wall motion abnormalities are   noted. Indeterminate diastolic function. Aortic valve appears slightly   thickened/calcified but opens adequately. A bubble study was performed and   fails to show any evidence of right to left shunting. Left atrium normal size. EKG 2/12/21: Sinus tachycardia. Impression  1. Acute right MCA ischemic stroke  2. HLD  3. DM  4. Hx Stroke  5.  CHF Tio Santacruz is a 78 y.o. female with history of Hyperlipidemia, diabetes mellitus, stroke in 2006 with congestive heart failure was admitted with aphasia. MRI unobtainable given pacer. Repeat CT Head w/o revealed evolving right MCA territory infarct. CTA with acute thrombus of right M1 of MCA. 50% atherosclerotic stenosis of the proximal internal carotid arteries. TTE grossly unrevealing for source. Mechanism of stroke: Cannot exclude vessel to vessel vs. Cardioembolic. Appears to be clinically stable. Recommendations  - Continue home DAPT. - High intensity statin, LDL goal < 70  - Will discuss with Neurology attending if feel DELMIS is warranted. - Slowly trend towards normotension. Avoid marked fluctuations and hypotension as able. - Euglycemia HBA1c goal < 7  - Telemetry monitoring while inpatient. Consider 30 day event monitor on discharge. Followed by loop recorder if unrevealing.   - PT, OT, SLP.   - Follow up with [facility] 1-3 months after discharge. ADDENDUM @ 15:36. Discussed with Neurology attending, do not feel DELMSI is needed at this time. Will sign off. Please do not hesitate to call back with any questions or concerns.      Martine Milan, [address] Neurology    A copy of this note was provided for Dr Mehdi Mccrary MD

## 2021-02-15 NOTE — PROGRESS NOTES
Speech Language Pathology  Facility/Department: [facility]  Speech / Language / Cognitive Daily Treatment Note    NAME: Mabel Gordillo  : 1945  MRN: [id number]    Patient Diagnosis(es):   Patient Active Problem List    Diagnosis Date Noted    Acute-on-chronic respiratory failure ([facility] 2012     Priority: High    Pneumonia 01/10/2012     Priority: High    Hyperkalemia 2012     Priority: Medium    Sepsis ([facility] 2012     Priority: Medium    Chronic obstructive pulmonary disease (HCC)      Priority: Medium    CAD (coronary artery disease)      Priority: Medium    Type II diabetes mellitus, well controlled ([facility] [facility]      Priority: Low    [facility] 2021    Chronic bilateral low back pain without sciatica     Chronic ischemic heart disease     Pacemaker     Chronic respiratory failure with hypoxia ([facility]     MDD (major depressive disorder), single episode 2020    Chest pain 2016    Chest pressure     Shortness of breath     History of coronary artery disease     CAP (community acquired pneumonia)     Pleural effusion 2012     Allergies: Allergies   Allergen Reactions    Buspar [Buspirone Hcl]          Chart reviewed. Pain: None indicated via any means    Medical diagnosis: CVA  Treatment diagnosis: fluent aphasia    Subjective: Pt seen with breakfast tray. Improved cooperation with reduced agitation noted as compared to yesterday's sessions. Treatment:  Pt seen to address the following goals:  Goal 1: Patient will communicate basic wants/needs by any means. : Pt indicated dislike or desire to d/c PO via pushing objects away. Unable to verbalize preferences or answer Y/N questions re: wants / needs at this time despite MAX cues.  Continue goal. 2/15: Max cues required to indicate preferences re: breakfast tray. Pt pushed items away to indicate dislike. Continued to be unable at this time to verbalize preferences or consistently answer Y / N questions. Goal met - continue goal.    Goal 2: Patient will follow basic level directions with moderate cues. 2/14: 0% accuracy for contextual single step directions despite MAX cues. Continue goal.  2/15: 0% accuracy for contextual single step directions despite MAX cues. Dependent to demonstrate appropriate functional use of objects. Continue goal.    Goal 3: Patient will complete naming tasks with 70% accuracy given max cues. 2/14: 0% accuracy for naming objects despite MAX cues. Pt 0% accurate for stating name despite max cues. Pt appeared to have severely impaired object recognition this date with inappropriate use of utensils, sticking fingers in liquids vs drinking, etc.   2/15: Pt verbalized \"coffee\" given MAX cues; 0% accuracy for verbalizing name of all other items despite max cues. Pt with grammatically appropriate utterance x 1, all other verbalizations full of jargon or neologisms w/o awareness. Continue goal.    Goal 4: Patient will answer yes/no questions with 60% accuracy. 2/14: <25% accuracy despite MAX cues. Continue goal.  2/15: 0% accuracy despite MAX cues. Continue goal.    Goal 5: Patient will tolerate ongoing cognitive/linguistic assessment. 2/14: Pt with moderately impaired joint attention which appeared exacerbated by agitation, continuing to make eye contact with SLP and not attending to objects being discussed despite MAX cues intermittently. Continue goal.  2/15: Suspected L neglect / inattention with poor awareness of LUE (placing hand in liquids w/o awareness) and unresponsive to cues to attend to L side despite MAX cues. Continued with reduced joint attention to objects intermittently.  Continue goal.    Education: Attempted to educate pt to role of SLP, purpose of visit, rationale for assessment, and communication deficits s/p CVA. Assessment: Continues with severe fluent aphasia. At this time, pt does not demonstrate the ability follow even basic highly contextualized directions or answer basic personal questions appropriately. Pt also demonstrates indications of L neglect and poor stimulability to attend to L side exhibited. Verbal expression marked by jargon and neologisms. Given severity of comprehension deficits, alternative means of communication not appropriate for pt at this time. Plan:  Continue speech/language therapy to address above goals, 3-5 x/week x LOS  DC recommendations: ongoing ST recommended    D/W nursing, [facility]  Needs met prior to leaving room, call button in reach. Treatment time: 15 min speech    Jeff Kingston MA CCC-SLP; [id number]  Speech-Language Pathologist  Pager # [phone]  Phone # [phone]  Office # [phone]    If patient is discharged prior to next session, this note will serve as discharge summary.

## 2021-02-15 NOTE — PROGRESS NOTES
Internal Medicine  Progress Note  PGY-1    Hospital Day: 3                                                      Admit Date: 2/13/2021                                     PCP: Cici Tinsley MD      CC: AMS                      Interval Hx:   No acute events overnight. -147/69-77. Pt was seen and examined at bedside. Unable to obtain ROS, pt with severe aphasia. Does not follow commands, answer question. Does localize to pain. HPI:  Patient is a 78 y.o. female with a PMHx significant for COPD (on5L of O2 at home), CAD s/p stent, DM, Hypothyroidism, and HLD presented with altered mental status.      Patient has expressive aphasia and all history was taken from patient's chart. Per chart, patient was presented to [facility] emergency department for evaluation of altered mental status. Per EMS, patient lives at home by herself. Last time family check on her was 48 hours before her presentation to ED. Son mentions that patient was talking in [place]. Per note, patient was not following commands in the ED. When I ask what her name was, patient responded [phone]. In ED, no family member was present and bedside to provide further history.     In [facility] ED, blood pressure was 219/99, heart rate 101, temperature 99.1 °F, respiratory rate 26. CT/CTA head was done and showed Acute ischemic infarct in the right middle cerebral artery territory, Acute thrombosis of the M1 segment right middle cerebral artery with paucity of contrast in distal branches, Severe stenosis of the bilateral posterior cerebral artery P2 segments.  stroke was consulted and patient was not a candidate for TPA or embolectomy. Therefore, patient was [facility] for [facility] to ICU.     Daily Plan:  2/15/2021    Medications:    Scheduled Meds:   amLODIPine  5 mg Oral Daily    budesonide  0.5 mg Nebulization BID    Arformoterol Tartrate  15 mcg Nebulization BID    rOPINIRole  0.5 mg Oral Nightly  HYDROcodone-acetaminophen  1 tablet Oral Q6H    atorvastatin  80 mg Oral Daily    sodium chloride flush  10 mL Intravenous 2 times per day    insulin lispro  0-12 Units Subcutaneous TID WC    insulin lispro  0-6 Units Subcutaneous Nightly    heparin (porcine)  5,000 Units Subcutaneous 3 times per day    ipratropium-albuterol  1 vial Inhalation 4x daily    insulin glargine  5 Units Subcutaneous Nightly    aspirin  81 mg Oral Daily    clopidogrel  75 mg Oral Daily    levothyroxine  125 mcg Oral Daily    pantoprazole  40 mg Oral QAM AC    predniSONE  7.5 mg Oral Daily    traZODone  50 mg Oral Nightly    divalproex  250 mg Oral Nightly    [Held by provider] lisinopril  40 mg Oral Daily      Continuous Infusions:   dextrose       PRN Meds:LORazepam, albuterol, sodium chloride flush, polyethylene glycol, promethazine **OR** ondansetron, acetaminophen **OR** acetaminophen, glucose, dextrose, glucagon (rDNA), dextrose, perflutren lipid microspheres, labetalol    Allergies: Allergies   Allergen Reactions    Buspar [Buspirone Hcl]          Physical Exam:     Vitals: BP (!) 147/76   Pulse 82   Temp 98.1 °F (36.7 °C) (Oral)   Resp 16   Wt 184 lb 11.9 oz (83.8 kg)   SpO2 94%   BMI 33.79 kg/m²     I/O:      Intake/Output Summary (Last 24 hours) at 2/15/2021 8004  Last data filed at 2/15/2021 5818  Gross per 24 hour   Intake 240 ml   Output    Net 240 ml     Physical Exam  Constitutional:       General: She is not in acute distress. Appearance: She is not ill-appearing, toxic-appearing or diaphoretic. Comments: Awake, sitting up in bed    HENT:      Head: Normocephalic and atraumatic. Eyes:      General: No scleral icterus. Right eye: No discharge. Left eye: No discharge. Comments: Unable to assess EOM   Neck:      Musculoskeletal: Neck supple. No neck rigidity. Cardiovascular:      Rate and Rhythm: Normal rate and regular rhythm. Heart sounds: Normal heart sounds. No murmur. No friction rub. No gallop. Pulmonary:      Effort: Pulmonary effort is normal. No respiratory distress. Breath sounds: Normal breath sounds. No wheezing, rhonchi or rales. Abdominal:      General: Bowel sounds are normal. There is no distension. Palpations: Abdomen is soft. Tenderness: There is no abdominal tenderness. There is no guarding or rebound. Musculoskeletal:         General: No swelling or tenderness. Skin:     General: Skin is warm and dry. Coloration: Skin is not jaundiced or pale. Neurological:      Comments: Awake, unable to assess orientation. Severe receptive aphasia. Not following commands this morning. Does move all extremities spontaneously. DATA:       Labs  CBC:   Recent Labs     02/13/21  0549 02/14/21  0434 02/15/21  0542   WBC 10.1 13.3* 9.1   HGB 14.0 12.7 13.3   HCT 43.5 40.5 41.3    270 267       BMP:   Recent Labs     02/13/21  0549 02/14/21  0434 02/15/21  0542    140 143   K 3.8 3.8 3.7   CL 98* 100 101   CO2 27 28 32   BUN 21* 28* 25*   CREATININE 1.2 1.1 1.3*   GLUCOSE 250* 144* 131*     LFT's:   Recent Labs     02/12/21 2035   AST 21   ALT 12   BILITOT 0.4   ALKPHOS 91     Troponin:   Recent Labs     02/12/21 2035   TROPONINI <0.01     INR:   Recent Labs     02/12/21 2035   INR 1.14       Urinalysis:  Lab Results   Component Value Date    NITRU Negative 02/12/2021    WBCUA 0-2 02/12/2021    BACTERIA Rare 07/06/2017    RBCUA 5-10 02/12/2021    BLOODU TRACE-INTACT 02/12/2021    SPECGRAV 1.010 02/12/2021    GLUCOSEU Negative 02/12/2021       Radiology:  CT head without contrast   Final Result        Evolving right MCA territory infarct. No hemorrhage, herniation, or mass-   effect.       MRI BRAIN WO CONTRAST    (Results Pending)       ASSESSMENT AND PLAN: Ossie Apgar a 78 y. o. female with PMHx significant for COPD (on 5L of O2), CAD s/p stent, DM, Hypothyroidism, and HLD presented with altered mental status.      Acute R MCA Ischemic stroke [facility]):  Repeat CT head yesterday with evolving infarct, no hemorrhage. ECHO with no evidence of PFO. TChol 223//HDL 34/, A1C 6.1. Pt's pacemaker is incompatible with MRI.  - Neurology consulted, apprec recs  - Goal BP now 140/90  - ASA, plavix, lipitor  - Telemetry, will need cardiac monitor on DC  - SLP (2/15): mild-moderate pocketing; continue dysphagia minced and moist + thins (no straws), 1:1 feed recommended d/t pt's cognitive-linguistic impairments, oral care with suction kit ONLY  - PT/OT/SLP     Hypertensive Emergency   -147/69-77 overnight.   - Hold home lisinopril - ANA  - Started norvasc 5 mg daily  - PRN labetalol      ANA  Cr 1.3 this AM (baseline ~1), lisionopril was started yesterday. - Hold home lisinopril - ANA  - Started norvasc 5 mg daily  - Started IV NS at 75 mL/hr    Chronic Diastolic Heart Failure  No s/s acute exacerbation, ECHO with EF 65-70%  - Daily weights  - Strict I/O     DM II  Last Hemoglobin A1c 6.2 (2020).   Takes Metformin 1000 mg twice daily at home. Hgb A1c 6.1 (21). - Lantus 5 units nightly  - MDSSI  - Hypoglycemia protocol  - Accuchecks AC/HS     Hypothyroidism  TSH 21 1.45.  - Continue home synthroid      Chronic Hypoxic Respiratory Failure 2/2 COPD  She is on 5 L of oxygen and BREO at home.   - Supplemental O2  - DuoNeb, Brovana, Pulmicort  - Prednisone 7.5 mg daily     CAD s/p stent:  - ASA/Plavix, Lipitor     Psych  Valproic acid level < 2.8  - Depakote 250 mg nightly  - Trazodone 50 mg nightly      Code Status:Full Code  FEN: DIET DYSPHAGIA MINCED AND MOIST; Carb Control: 4 carb choices (60 gms)/meal  PPX: SubQ heparin  DISPO: GMF  PT/OT Eval Status: Consulted     I will discuss the patient with Tia Kitchen MD  ----------------------------- Zachariah Marinelli MD  Internal Medicine Resident, PGY-1

## 2021-02-15 NOTE — PROGRESS NOTES
Occupational Therapy   Occupational Therapy Initial Assessment & Tx  Date: 2/15/2021   Patient Name: Jaron Adams  MRN: [id number]     : 1945    Date of Service: 2/15/2021    Discharge Recommendations: Jaron Adams scored a  on the AM-PAC ADL Inpatient form. Current research shows that an AM-PAC score of 17 or less is typically not associated with a discharge to the patient's home setting. Based on the patient's AM-PAC score and their current ADL deficits, it is recommended that the patient have 3-5 sessions per week of Occupational Therapy at d/c to increase the patient's independence. Please see assessment section for further patient specific details. If patient discharges prior to next session this note will serve as a discharge summary. Please see below for the latest assessment towards goals. OT Equipment Recommendations  Equipment Needed: (defer)    Assessment   Performance deficits / Impairments: Decreased functional mobility ; Decreased ADL status; Decreased safe awareness;Decreased cognition;Decreased posture;Decreased balance;Decreased coordination;Decreased endurance  Assessment: Pt w/ decreased activity tolerance for all standing ADLs. She has difficulty following commands and inconsistently recognizes familiar objects, however she attends to cues better on R side and attempts to verbalize sentences, with some gibberish. Currently requires 24 hr physical assist, and x2 assist for ADL transfers. Pt would benefit from cont. IP OT services to maximize functional independence & safety. Cont. per POC.   Treatment Diagnosis: Impaired ADLs & transfers  Prognosis: Good  Decision Making: Medium Complexity  OT Education: OT Role;Plan of Care;Transfer Training;Energy Conservation;Orientation  Patient Education: pt verb understanding to some of education ; req. reinforcement & repetition  REQUIRES OT FOLLOW UP: Yes  Activity Tolerance Activity Tolerance: Treatment limited secondary to decreased cognition;Treatment limited secondary to medical complications (free text)  Activity Tolerance: SpO2 dropped to low 80s after all standing activities, and pt req. increased time to recover 2/2 difficulty command-following for PLB (with VC/TC and visual demonstration). RN staff aware of decreased tolerance. Pt w/ increased breathing rate but seems unaware of breathing challenges (attempting to  spite of low SpO2). Safety Devices  Safety Devices in place: Yes  Type of devices: Call light within reach; Chair alarm in place; Left in chair;Nurse notified           Patient Diagnosis(es): There were no encounter diagnoses. has a past medical history of Arthritis, Bipolar depression ([facility] [address] CAD (coronary artery disease), CHF (congestive heart failure) ([facility] [address] COPD (chronic obstructive pulmonary disease) ([facility] [address] Depression, Diabetes mellitus ([facility] [address] Encounter for imaging to screen for metal prior to MRI, Hyperlipidemia, Oxygen dependent, Pneumonia, Thyroid disease, and Unspecified cerebral artery occlusion with cerebral infarction. has a past surgical history that includes Cholecystectomy; Tubal ligation; Hysterectomy (1975); Tonsillectomy; Appendectomy; pacemaker placement; vascular surgery; Colonoscopy; other surgical history (5/28/13); Pacemaker insertion; Cardiac surgery; Cardiac surgery; Cardiac pacemaker placement; and Diagnostic Cardiac Cath Lab Procedure.     Treatment Diagnosis: Impaired ADLs & transfers      Restrictions  Restrictions/Precautions  Restrictions/Precautions: Up as Tolerated  Position Activity Restriction  Other position/activity restrictions: up as tolerated    Subjective   General  Chart Reviewed: Yes  Patient assessed for rehabilitation services?: Yes  Additional Pertinent Hx: Hx = COPD (wears 5L O2 at home, per chart); CHF, DM, CAD, HLD, thyroid disease, bipolar/depression, cardiac pacemaker  Family / Caregiver Present: No Referring Practitioner: Emily Gayle MD  Diagnosis: 2/12 admit to ED, lives alone and family found her w/ AMS (speaking gibberish, not following commands). CT head found evolving right MCA territory infarct, no hemorrhage. Subjective  Subjective: Pt in bed on arrival w/ RN staff, tearful and upset about taking medication/shot. Pt cooperative with therapy, and attempts communicating via gibberish or short, slow sentences: \"I need rest.\" \"Why? \"  Patient Currently in Pain: (DANDRE 2/2 aphasia; pt seems comfortable)  Social/Functional History  Social/Functional History  Additional Comments: Pt unable to provide any history or previous level of function. Per chart pt is from home alone. Objective   Vision: (cont. to assess; no glasses present in room)  Hearing: Within functional limits(appears WFL (pt responds to voices & sounds); cont.  to assess)    Orientation  Overall Orientation Status: Impaired(DANDRE 2/2 aphasia; pt seeming confused about place & situation)     Balance  Sitting Balance: Contact guard assistance(dynamic reaching in supported sit; and for impulsivity EOB)  Standing Balance: (initially Min A, progressing to Max A for dynamic BUE task)   L knee buckling in stance; pt leaning progressively to L side in both sit and stand  Standing Balance  Time: up to 1 min, x2 bouts  Activity: pants management  Functional Mobility  Functional - Mobility Device: Rolling Walker  Activity: (bed > chair)  Assist Level: (Min x2)  Functional Mobility Comments: initiates movement when presented w/ RW: req'd VCs  Toilet Transfers  Toilet Transfers Comments: simulated BSC via bed>chair: Min x2  ADL  Feeding: Bringing food to mouth assist(RN assisting pt start of session (pt does not initiate this date))  Grooming: Maximum assistance(pt unable to use comb for hair; attempting to pull hair back behind head w/out hair tie) LE Dressing: Maximum assistance(pt initiates threading BLE but req. A for all components; pants management w/ BUE req'd several seated rest breaks 2/2 poor balance)        Bed mobility  Supine to Sit: Contact guard assistance(HOB elevated)  Scooting: Contact guard assistance  Transfers  Sit to stand: Minimal assistance(impulsively stands, requiring firm VC/TC to return to chair 2/2 poor activity tolerance)  Stand to sit: Minimal assistance(uncontrolled descent, unalligned to chair, despite firm VC/TC)  Vision - Basic Assessment  Vision Comments: difficult to assess 2/2 aphasia & limited command following; able to see objects & people in environment; cont. to assess  Pt scans both sides but possible L side visual neglect; cont. to assess  Cognition  Overall Cognitive Status: Exceptions  Arousal/Alertness: Delayed responses to stimuli  Following Commands: Inconsistently follows commands  Attention Span: Difficulty dividing attention; Attends with cues to redirect  Memory: (DANDRE 2/2 aphasia)  Safety Judgement: Decreased awareness of need for assistance;Decreased awareness of need for safety  Problem Solving: Assistance required to generate solutions;Assistance required to implement solutions;Assistance required to identify errors made  Insights: (DANDRE 2/2 aphasia)  Initiation: Requires cues for all  Sequencing: Requires cues for some  Cognition Comment: responds to <50% of simple verbal commands; has difficulty w/ more complex commands  Perception  Overall Perceptual Status: (scans both sides but follows commands better on R side; cont. to assess)     Sensation  Overall Sensation Status: (cont. to assess; limited by aphasia & cognition)        LUE AROM (degrees)  LUE AROM : WFL(shoulder flexion AAROM)  RUE AROM (degrees)  RUE AROM : (shoulder flexion AAROM)  LUE Strength  Gross LUE Strength: (DANDRE 2/2 aphasia & cognition; cont. to assess)  RUE Strength  Gross RUE Strength: (DANDRE 2/2 aphasia & cognition; cont.  to assess) Plan   Plan  Times per week: 5-7x  Times per day: Daily  Current Treatment Recommendations: Balance Training, Functional Mobility Training, Endurance Training, Safety Education & Training, Self-Care / ADL, Cognitive Reorientation, Neuromuscular Re-education    G-Code     OutComes Score                                                  AM-PAC Score        AM-PAC Inpatient Daily Activity Raw Score: 11 (02/15/21 1511)  AM-PAC Inpatient ADL T-Scale Score : 29.04 (02/15/21 1511)  ADL Inpatient CMS 0-100% Score: 70.42 (02/15/21 1511)  ADL Inpatient CMS G-Code Modifier : CL (02/15/21 1511)    Goals  Short term goals  Time Frame for Short term goals: discharge  Short term goal 1: Pt will don brief at Mod A level  Short term goal 2: Pt will complete BSC t/f at [address] A x1 level  Short term goal 3: Pt will complete grooming ADL w/ appropriate use of objects, min verbal cues  Patient Goals   Patient goals : none stated       Therapy Time   Individual Concurrent Group Co-treatment   Time In 1305         Time Out 1344         Minutes 39              Timed Code Treatment Minutes:  24     Total Treatment Minutes:  [id number] [facility], [place], s/OT  [facility] OTR/L #[id number]  Therapist was present, directed the patient's care, made skilled judgement, and was responsible for assessment and treatment of the patient.

## 2021-02-15 NOTE — PLAN OF CARE
Problem: Falls - Risk of:  Goal: Will remain free from falls  Description: Will remain free from falls  2/15/2021 1522 by Suki Bowman RN  Outcome: Ongoing  2/15/2021 0326 by Sharad Patterson RN  Outcome: Ongoing     All fall precautions in place. Bed locked and in lowest position with alarm on. Overbed table and personal belonings within reach. Call light within reach and patient instructed to use call light for assistance. Non-skid socks on. 2x assist with stedy for ambulation. Problem: Skin Integrity:  Goal: Will show no infection signs and symptoms  Description: Will show no infection signs and symptoms  2/15/2021 1522 by Suki Bowman RN  Outcome: Ongoing    No new signs of skin breakdown or infection. Skin is warm, clean, dry and intact with some blanchable redness on coccyx. Patient out of bed and up to the chair this shift. Patient toileted with pericare performed.

## 2021-02-15 NOTE — PROGRESS NOTES
Patient is alert and oriented to self only. Her VSS on room air at rest. Son states pt on 5L of O2 at home. Pt anxious and tearful throughout the morning. Ambulates x2 with stedy. Voiding adequately per BRP. Patients on dysphagia diet requiring 1:1 assist. All fall precautions in place.

## 2021-02-15 NOTE — PROGRESS NOTES
Patient oriented to self. VSS. No change in neuro status overnight. Slept well. Voiding adequately. Tolerating diet. Fall precautions in place.

## 2021-02-15 NOTE — PROGRESS NOTES
Physical Therapy    Facility/Department: [facility] 5T ORTHO/NEURO  Initial Assessment/Treatment    NAME: Hudson Villagomez  : 1945  MRN: [id number]    Date of Service: 2/15/2021    Discharge Recommendations:    Hudson Villagomez scored a 14/24 on the AM-PAC short mobility form. Current research shows that an AM-PAC score of 17 or less is typically not associated with a discharge to the patient's home setting. Based on the patient's AM-PAC score and their current functional mobility deficits, it is recommended that the patient have 3-5 sessions per week of Physical Therapy at d/c to increase the patient's independence. Please see assessment section for further patient specific details. If patient discharges prior to next session this note will serve as a discharge summary. Please see below for the latest assessment towards goals. PT Equipment Recommendations  Equipment Needed: No(defer to next level of care)    Assessment   Body structures, Functions, Activity limitations: Decreased functional mobility ; Decreased balance;Decreased strength;Decreased endurance;Decreased safe awareness;Decreased cognition  Assessment: Pt currently requiring Ax1 for bed mobility and for transfers however L knee buckling in stance at times requiring Ax1-2 and RW. Pt limited by decreased strength, balance, endurance as well as cognition/aphasia. Pt is well below her baseline and would benefit from further skilled PT to maximize safety and independence with functional mobility. Will continue to follow. Treatment Diagnosis: Decreased functional mobility  Prognosis: Good  Decision Making: Medium Complexity  Patient Education: role of PT, use of call light, d/c planning - pt with limited learning noted - rec reinforcement  Barriers to Learning: cognition  REQUIRES PT FOLLOW UP: Yes  Activity Tolerance  Activity Tolerance: Patient limited by cognitive status; Patient limited by fatigue;Patient limited by endurance Patient Diagnosis(es): There were no encounter diagnoses. has a past medical history of Arthritis, Bipolar depression ([facility] [address] CAD (coronary artery disease), CHF (congestive heart failure) ([facility] [address] COPD (chronic obstructive pulmonary disease) ([facility] [address] Depression, Diabetes mellitus ([facility] [address] Encounter for imaging to screen for metal prior to MRI, Hyperlipidemia, Oxygen dependent, Pneumonia, Thyroid disease, and Unspecified cerebral artery occlusion with cerebral infarction. has a past surgical history that includes Cholecystectomy; Tubal ligation; Hysterectomy (1975); Tonsillectomy; Appendectomy; pacemaker placement; vascular surgery; Colonoscopy; other surgical history (5/28/13); Pacemaker insertion; Cardiac surgery; Cardiac surgery; Cardiac pacemaker placement; and Diagnostic Cardiac Cath Lab Procedure. Restrictions  Restrictions/Precautions  Restrictions/Precautions: Up as Tolerated  Position Activity Restriction  Other position/activity restrictions: up as tolerated    Subjective  General  Chart Reviewed: Yes  Patient assessed for rehabilitation services?: Yes  Additional Pertinent Hx: Patient is a 78 y.o. female with a PMHx significant for COPD (on5L of O2 at home), CAD s/p stent, DM, Hypothyroidism, and HLD presented with altered mental status  Family / Caregiver Present: No  Referring Practitioner: Nikita Rain MD  Diagnosis: Acute Ischemic Stroke  Follows Commands: Within Functional Limits  Subjective  Subjective: Pt found sitting up in bed upon arrival, approriate to participate in PT eval per RN. Pt with aphasia noted throughout session saying non sensical words/phrases and sounds at times.   Pain Screening  Patient Currently in Pain: (DANDRE 2/2 aphasia; pt seems comfortable)          Orientation  Orientation  Overall Orientation Status: Impaired  Orientation Level: Unable to assess(2/2 aphasia)  Social/Functional History  Social/Functional History Additional Comments: Pt unable to provide any history or previous level of function. Per chart pt is from home alone. Cognition        Objective          AROM RLE (degrees)  RLE General AROM: Adequate for functional mobility  AROM LLE (degrees)  LLE General AROM: Adequate for functional mobility  Strength RLE  Strength RLE: WFL  Comment: Globally 3+/5 per pt's ability to perform functional mobility - unable to formally assess per aphasia/decreased ability to follow commands  Strength LLE  Comment: L knee seen to be slowing buckling in stance        Bed mobility  Supine to Sit: Contact guard assistance  Scooting: Contact guard assistance  Transfers  Sit to Stand: Minimal Assistance(from EOB, from recliner x2 trials)  Stand to sit: Minimal Assistance; Moderate Assistance(to recliner x3 trials - VC/TC for hand placement and sequencing. Min -mod Ax2 to sit in 1 trial 2/2 to lack of understanding with LLE knee buckling)  Ambulation  Ambulation?: Yes  Ambulation 1  Surface: level tile  Device: Rolling Walker  Assistance: 2 Person assistance(min Ax2)  Quality of Gait: slow effortful, L lateral lean and L knee beginning to buckle with fatigue. Assist needed for RW management  Distance: 3' EOB to chair  Stairs/Curb  Stairs?: No     Balance  Posture: Fair  Sitting - Static: Good  Sitting - Dynamic: Good  Standing - Static: Fair  Standing - Dynamic: Fair(pt standing [id number] for doff/don of old/new brief with L lateral lean and L knee beginning to buckle with fatigue.  Pt requiring min-max Ax1 for balance.)        Plan   Plan  Times per week: 5-7  Times per day: Daily  Current Treatment Recommendations: Strengthening, ROM, Gait Training, Endurance Training, Transfer Training, Functional Mobility Training, Safety Education & Training, Home Exercise Program, Balance Training  Safety Devices  Type of devices: Gait belt, Chair alarm in place, Call light within reach, Left in chair, Nurse notified    AM-PAC Score AM-PAC Inpatient Mobility Raw Score : 14 (02/15/21 1531)  AM-PAC Inpatient T-Scale Score : 38.1 (02/15/21 1531)  Mobility Inpatient CMS 0-100% Score: 61.29 (02/15/21 1531)  Mobility Inpatient CMS G-Code Modifier : CL (02/15/21 1531)          Goals  Short term goals  Time Frame for Short term goals: d/c  Short term goal 1: sup<>sit supervision  Short term goal 2: sit<>stand CGA  Short term goal 3: amb 48' with RW and min Ax1  Patient Goals   Patient goals : none stated       Therapy Time   Individual Concurrent Group Co-treatment   Time In 1305         Time Out 1344         Minutes 39           Timed Code Treatment Minutes: 24     Total Treatment Minutes:  39    If the patient is discharged before the next treatment session, this note will serve as the discharge summary.      Romulo Spencer, PT, DPT

## 2021-02-15 NOTE — PROGRESS NOTES
Speech Language Pathology  Facility/Department: [facility] 5T  Dysphagia Daily Treatment Note    NAME: Mabel Gordillo  : 1945  MRN: [id number]    Patient Diagnosis(es):   Patient Active Problem List    Diagnosis Date Noted    Acute-on-chronic respiratory failure ([facility] [address] 2012     Priority: High    Pneumonia 01/10/2012     Priority: High    Hyperkalemia 2012     Priority: Medium    Sepsis ([facility] [address] 2012     Priority: Medium    Chronic obstructive pulmonary disease (HCC)      Priority: Medium    CAD (coronary artery disease)      Priority: Medium    Type II diabetes mellitus, well controlled ([facility] [address]      Priority: Low    Acute ischemic stroke ([facility] [address] 2021    Chronic bilateral low back pain without sciatica     Chronic ischemic heart disease     Pacemaker     Chronic respiratory failure with hypoxia ([facility] [address]     MDD (major depressive disorder), single episode 2020    Chest pain 2016    Chest pressure     Shortness of breath     History of coronary artery disease     CAP (community acquired pneumonia)     Pleural effusion 2012     Allergies: Allergies   Allergen Reactions    Buspar [Buspirone Hcl]            CXR (21)  Apical predominant emphysema.       No acute cardiopulmonary disease. Previous MBS  None    Chart reviewed.     Medical Diagnosis: CVA  Treatment Diagnosis: dysphagia    BSE Impression (21)  Dysphagia Diagnosis: Mild oral stage dysphagia Dysphagia Impression : Patient presents with mild oropharyngeal dysphagia. Unable to complete oral motor exam d/t receptive aphasia. With pt positioned upright in bed, analyzed tolerance ice chips, thins via tsp/straw/cup (3oz), puree, and soft solid. Pt with positive oral acceptance, mildly prolonged mastication, positive swallow movement, good oral clearance, no overt signs of aspiration or penetration. Pt noted to be somewhat impulsive with straw, and commented \"that almost went down wrong! \". Recommend dysphagia 2 minced & moist solids / thins via cup (avoid straw), meds in puree; as tolerated. Will continue to follow. MBS results  Pt not appropriate to participate at this time given absent direction following and inconsistent willingness to take PO    Pain: none indicated via any means    Current Diet :  Dysphagia minced and moist / thin liquids    Treatment:  Pt seen bedside to address the following goals:  Goal 1: Patient will tolerate least restrictive diet without overt signs of aspiration or associated decline in respiratory status. 2/14: Pt in bed upon entry - RN reported pt attempting to get OOB and not following directions this AM. Pt with severe fluent aphasia, unable at this time to answer questions appropriately or follow commands despite MAX cues. Pt with breakfast tray in front of her with no initiation to take PO despite MAX cues. SLP administered boluses of minced and moist solid x 1, puree x 4, and sips of thins via cup edge. Pt w/o ability this AM to use straw appropriately and w/o recognition of any objects, frequently questioning \"what's that? \" and appeared to demonstrate no comprehension when informed of item name. Pt with agitation with encouragement to take PO and adamantly stating \"no no no\" at attempts of providing oral care. Mastication of minced and moist solid very prolonged with eventual throat clear and expectoration of part of bolus - unable to determine if d/t cognitive linguistic status negatively impacting swallow vs dislike of PO. Provided pt soft solid trial and pt indicated interest but then dunked solid in cream of wheat and completely submerged w/o apparent awareness. Pt attempting to use straw as spoon and sticking fingers in liquids w/o apparent comprehension to take cup and take sips via cup edge despite MAX cues at times. No overt s/s associated with aspiration exhibited with any trials although assessment limited d/t pt accepting only minimal trials despite max encouragement. Recommend continue minced and moist solids + thin liquids to offer pt greater variety of PO in effort for improved intake; however, may need to consider changing diet to puree vs full liquid if difficulty with solids persists.  Continue goal. 2/15: Pt in bed with breakfast tray in front of her. Reduced agitation noted during session this date although still inconsistently accepting PO. Pt continues with severe fluent aphasia and unable at this time to follow any directions or answer comprehension questions appropriately. Continues with dependency to use utensils appropriate and take PO correctly (eg perseverating on attempting to eat coffee with fork despite MAX cues). Analyzed pt with minced and moist solids, soft and bite sized solids, and thins via cup edge. Oral prep of solids prolonged with mild-mod pocketing in L buccal cavity w/o awareness. When SLP completed finger sweep, pt did not propel residue posteriorly but continuously expectorated. Pt with cough and suspected regurgitation of solids x 1; pt also with grimacing and apparent belching / esophageal discomfort with liquids x 2 - ? if esophageal dysfunction. Oral care completed with toothbrush and toothpaste at end of session - dependent for SLP to complete d/t absent direction following. Residue from dinner meal found in upper L buccal cavity. Pt is unable at this time to follow directions / motor plan for expectoration of toothpaste or complete swish / spit, resulting in swallowing residue from oral care. Recommend only completing oral care with suction kit at this time. Continue dysphagia minced and moist + thin liquids. Continue goal.    Goal 2: Patient/caregiver will demonstrate understanding of swallow function/recommendations. 2/14: Educated pt to role of SLP, purpose of visit, importance of PO intake for recovery s/p CVA, concerns for dysphagia, and risks of choking / aspiration. Pt demonstrates no comprehension. D/C GOAL - not indicated given pt's cognitive lingusitic status.     Patient/Family/Caregiver Education: Attempted to educate pt to role of SLP, purpose of visit, dysphagia s/p CVA, strategies for oral dysphagia, importance of PO intake, and POC. Pt demonstrates no comprehension. Compensatory Strategies:  No straws  Alternate solids and liquids  Upright as possible for all oral intake  Small bites/sips  Eat/Feed slowly  1:1 feed       Plan:  Continued daily Dysphagia treatment with goals per  plan of care. Diet recommendations: continue dysphagia minced and moist + thins (no straws)    1:1 feed recommended d/t pt's cognitive-linguistic impairments    Oral care with suction kit ONLY    DC recommendation: ongoing tx indicated at this time  Treatment: 15 min  D/W nursing  Needs met prior to leaving room, call button in reach.     Lexie Daniel MA CCC-SLP; [id number]  Speech-Language Pathologist  Pager # [phone]  Phone # [phone]  Office # [phone]    If patient is discharged prior to next treatment, this note will serve as the discharge summary

## 2021-02-15 NOTE — PROGRESS NOTES
Patient assisted in [address] son Donna Valerie. Patient still somewhat aphasic but able to say simple sentences and words in reply to son.

## 2021-02-16 ENCOUNTER — NURSE ONLY (OUTPATIENT)
Dept: CARDIOLOGY CLINIC | Age: 76
End: 2021-02-16
Payer: MEDICARE

## 2021-02-16 DIAGNOSIS — Z95.0 PACEMAKER: Primary | ICD-10-CM

## 2021-02-16 DIAGNOSIS — I49.5 SICK SINUS SYNDROME (HCC): ICD-10-CM

## 2021-02-16 LAB
ANION GAP SERPL CALCULATED.3IONS-SCNC: 10 MMOL/L (ref 3–16)
BASOPHILS ABSOLUTE: 0.1 K/UL (ref 0–0.2)
BASOPHILS RELATIVE PERCENT: 0.7 %
BUN BLDV-MCNC: 20 MG/DL (ref 7–20)
CALCIUM SERPL-MCNC: 9 MG/DL (ref 8.3–10.6)
CHLORIDE BLD-SCNC: 106 MMOL/L (ref 99–110)
CO2: 28 MMOL/L (ref 21–32)
CREAT SERPL-MCNC: 1 MG/DL (ref 0.6–1.2)
EOSINOPHILS ABSOLUTE: 0.1 K/UL (ref 0–0.6)
EOSINOPHILS RELATIVE PERCENT: 1.3 %
GFR AFRICAN AMERICAN: >60
GFR NON-AFRICAN AMERICAN: 54
GLUCOSE BLD-MCNC: 110 MG/DL (ref 70–99)
GLUCOSE BLD-MCNC: 110 MG/DL (ref 70–99)
GLUCOSE BLD-MCNC: 113 MG/DL (ref 70–99)
GLUCOSE BLD-MCNC: 124 MG/DL (ref 70–99)
GLUCOSE BLD-MCNC: 98 MG/DL (ref 70–99)
HCT VFR BLD CALC: 38.7 % (ref 36–48)
HEMOGLOBIN: 12.4 G/DL (ref 12–16)
LYMPHOCYTES ABSOLUTE: 2.3 K/UL (ref 1–5.1)
LYMPHOCYTES RELATIVE PERCENT: 27.9 %
MAGNESIUM: 2 MG/DL (ref 1.8–2.4)
MCH RBC QN AUTO: 28.3 PG (ref 26–34)
MCHC RBC AUTO-ENTMCNC: 31.9 G/DL (ref 31–36)
MCV RBC AUTO: 88.8 FL (ref 80–100)
MONOCYTES ABSOLUTE: 0.8 K/UL (ref 0–1.3)
MONOCYTES RELATIVE PERCENT: 9.5 %
NEUTROPHILS ABSOLUTE: 4.9 K/UL (ref 1.7–7.7)
NEUTROPHILS RELATIVE PERCENT: 60.6 %
PDW BLD-RTO: 14.7 % (ref 12.4–15.4)
PERFORMED ON: ABNORMAL
PERFORMED ON: NORMAL
PLATELET # BLD: 244 K/UL (ref 135–450)
PMV BLD AUTO: 7.9 FL (ref 5–10.5)
POTASSIUM SERPL-SCNC: 3.7 MMOL/L (ref 3.5–5.1)
RBC # BLD: 4.36 M/UL (ref 4–5.2)
SARS-COV-2, NAAT: NOT DETECTED
SODIUM BLD-SCNC: 144 MMOL/L (ref 136–145)
WBC # BLD: 8.1 K/UL (ref 4–11)

## 2021-02-16 PROCEDURE — 6370000000 HC RX 637 (ALT 250 FOR IP): Performed by: STUDENT IN AN ORGANIZED HEALTH CARE EDUCATION/TRAINING PROGRAM

## 2021-02-16 PROCEDURE — 97530 THERAPEUTIC ACTIVITIES: CPT

## 2021-02-16 PROCEDURE — 97535 SELF CARE MNGMENT TRAINING: CPT

## 2021-02-16 PROCEDURE — 94761 N-INVAS EAR/PLS OXIMETRY MLT: CPT

## 2021-02-16 PROCEDURE — 83735 ASSAY OF MAGNESIUM: CPT

## 2021-02-16 PROCEDURE — 80048 BASIC METABOLIC PNL TOTAL CA: CPT

## 2021-02-16 PROCEDURE — 6360000002 HC RX W HCPCS: Performed by: STUDENT IN AN ORGANIZED HEALTH CARE EDUCATION/TRAINING PROGRAM

## 2021-02-16 PROCEDURE — 2580000003 HC RX 258: Performed by: STUDENT IN AN ORGANIZED HEALTH CARE EDUCATION/TRAINING PROGRAM

## 2021-02-16 PROCEDURE — 2060000000 HC ICU INTERMEDIATE R&B

## 2021-02-16 PROCEDURE — 85025 COMPLETE CBC W/AUTO DIFF WBC: CPT

## 2021-02-16 PROCEDURE — 92526 ORAL FUNCTION THERAPY: CPT

## 2021-02-16 PROCEDURE — 94640 AIRWAY INHALATION TREATMENT: CPT

## 2021-02-16 PROCEDURE — 87635 SARS-COV-2 COVID-19 AMP PRB: CPT

## 2021-02-16 PROCEDURE — 36415 COLL VENOUS BLD VENIPUNCTURE: CPT

## 2021-02-16 PROCEDURE — 94664 DEMO&/EVAL PT USE INHALER: CPT

## 2021-02-16 PROCEDURE — 92507 TX SP LANG VOICE COMM INDIV: CPT

## 2021-02-16 PROCEDURE — 93280 PM DEVICE PROGR EVAL DUAL: CPT | Performed by: INTERNAL MEDICINE

## 2021-02-16 RX ORDER — HYDROCODONE BITARTRATE AND ACETAMINOPHEN 10; 325 MG/1; MG/1
1 TABLET ORAL EVERY 6 HOURS PRN
Qty: 12 TABLET | Refills: 0 | Status: SHIPPED | OUTPATIENT
Start: 2021-02-16 | End: 2021-02-19

## 2021-02-16 RX ORDER — DIVALPROEX SODIUM 250 MG/1
250 TABLET, DELAYED RELEASE ORAL NIGHTLY
Qty: 90 TABLET | Refills: 3 | Status: SHIPPED | OUTPATIENT
Start: 2021-02-16

## 2021-02-16 RX ORDER — AMLODIPINE BESYLATE 5 MG/1
5 TABLET ORAL DAILY
Qty: 30 TABLET | Refills: 3 | Status: SHIPPED | OUTPATIENT
Start: 2021-02-17

## 2021-02-16 RX ADMIN — ARFORMOTEROL TARTRATE 15 MCG: 15 SOLUTION RESPIRATORY (INHALATION) at 08:10

## 2021-02-16 RX ADMIN — INSULIN GLARGINE 5 UNITS: 100 INJECTION, SOLUTION SUBCUTANEOUS at 20:53

## 2021-02-16 RX ADMIN — HEPARIN SODIUM 5000 UNITS: 5000 INJECTION INTRAVENOUS; SUBCUTANEOUS at 20:49

## 2021-02-16 RX ADMIN — Medication 10 ML: at 09:36

## 2021-02-16 RX ADMIN — TRAZODONE HYDROCHLORIDE 50 MG: 50 TABLET ORAL at 20:49

## 2021-02-16 RX ADMIN — LEVOTHYROXINE SODIUM 125 MCG: 125 TABLET ORAL at 09:36

## 2021-02-16 RX ADMIN — PREDNISONE 7.5 MG: 5 TABLET ORAL at 09:36

## 2021-02-16 RX ADMIN — ATORVASTATIN CALCIUM 80 MG: 80 TABLET, FILM COATED ORAL at 09:38

## 2021-02-16 RX ADMIN — Medication 10 ML: at 21:15

## 2021-02-16 RX ADMIN — HYDROCODONE BITARTRATE AND ACETAMINOPHEN 1 TABLET: 10; 325 TABLET ORAL at 20:49

## 2021-02-16 RX ADMIN — HEPARIN SODIUM 5000 UNITS: 5000 INJECTION INTRAVENOUS; SUBCUTANEOUS at 05:44

## 2021-02-16 RX ADMIN — IPRATROPIUM BROMIDE AND ALBUTEROL SULFATE 3 ML: .5; 3 SOLUTION RESPIRATORY (INHALATION) at 11:56

## 2021-02-16 RX ADMIN — DIVALPROEX SODIUM 250 MG: 250 TABLET, DELAYED RELEASE ORAL at 20:49

## 2021-02-16 RX ADMIN — ASPIRIN 81 MG: 81 TABLET, CHEWABLE ORAL at 09:38

## 2021-02-16 RX ADMIN — BUDESONIDE INHALATION 500 MCG: 0.5 SUSPENSION RESPIRATORY (INHALATION) at 08:10

## 2021-02-16 RX ADMIN — CLOPIDOGREL BISULFATE 75 MG: 75 TABLET ORAL at 09:36

## 2021-02-16 RX ADMIN — ROPINIROLE HYDROCHLORIDE 0.5 MG: 0.25 TABLET, FILM COATED ORAL at 20:49

## 2021-02-16 RX ADMIN — IPRATROPIUM BROMIDE AND ALBUTEROL SULFATE 3 ML: .5; 3 SOLUTION RESPIRATORY (INHALATION) at 08:10

## 2021-02-16 RX ADMIN — AMLODIPINE BESYLATE 5 MG: 5 TABLET ORAL at 09:38

## 2021-02-16 RX ADMIN — HEPARIN SODIUM 5000 UNITS: 5000 INJECTION INTRAVENOUS; SUBCUTANEOUS at 15:52

## 2021-02-16 RX ADMIN — HYDROCODONE BITARTRATE AND ACETAMINOPHEN 1 TABLET: 10; 325 TABLET ORAL at 09:36

## 2021-02-16 ASSESSMENT — PAIN SCALES - GENERAL: PAINLEVEL_OUTOF10: 0

## 2021-02-16 ASSESSMENT — PAIN DESCRIPTION - ONSET: ONSET: ON-GOING

## 2021-02-16 ASSESSMENT — PAIN DESCRIPTION - FREQUENCY: FREQUENCY: CONTINUOUS

## 2021-02-16 ASSESSMENT — PAIN DESCRIPTION - PROGRESSION
CLINICAL_PROGRESSION: GRADUALLY WORSENING
CLINICAL_PROGRESSION: GRADUALLY IMPROVING

## 2021-02-16 ASSESSMENT — PAIN DESCRIPTION - LOCATION: LOCATION: BACK

## 2021-02-16 ASSESSMENT — PAIN SCALES - WONG BAKER: WONGBAKER_NUMERICALRESPONSE: 0

## 2021-02-16 NOTE — PROGRESS NOTES
Speech Language Pathology  Facility/Department: [facility]  Speech / Language / Cog Daily Treatment Note    NAME: Kim Valle  : 1945  MRN: [id number]    Patient Diagnosis(es):   Patient Active Problem List    Diagnosis Date Noted    Acute-on-chronic respiratory failure ([facility] [address] 2012     Priority: High    Pneumonia 01/10/2012     Priority: High    Hyperkalemia 2012     Priority: Medium    Sepsis ([address] 2012     Priority: Medium    Chronic obstructive pulmonary disease (HCC)      Priority: Medium    CAD (coronary artery disease)      Priority: Medium    Type II diabetes mellitus, well controlled ([facility] [address]      Priority: Low    Acute ischemic stroke ([facility] [address] 2021    Chronic bilateral low back pain without sciatica     Chronic ischemic heart disease     Pacemaker     Chronic respiratory failure with hypoxia ([facility] [address]     MDD (major depressive disorder), single episode 2020    Chest pain 2016    Chest pressure     Shortness of breath     History of coronary artery disease     CAP (community acquired pneumonia)     Pleural effusion 2012     Allergies: Allergies   Allergen Reactions    Buspar [Buspirone Hcl]      Chart reviewed. Pain: None indicated via any means    Medical diagnosis: CVA  Treatment diagnosis: fluent aphasia    Treatment:  Pt seen to address the following goals:  Goal 1: Patient will communicate basic wants/needs by any means. : Pt indicated dislike or desire to d/c PO via pushing objects away. Unable to verbalize preferences or answer Y/N questions re: wants / needs at this time despite MAX cues. Continue goal.  2/15: Max cues required to indicate preferences re: breakfast tray. Pt pushed items away to indicate dislike. Continued to be unable at this time to verbalize preferences or consistently answer Y / N questions.  Goal met - continue goal. 2/15: Suspected L neglect / inattention with poor awareness of LUE (placing hand in liquids w/o awareness) and unresponsive to cues to attend to L side despite MAX cues. Continued with reduced joint attention to objects intermittently. Continue goal.  2/16: pt read single word x 1 orally. No other accurate responses. Pt cont with decreased attention to left  Cont goal    Education:  Attempted to educate pt to role of SLP, purpose of visit, rationale for assessment, and communication deficits s/p CVA. Pt with questionable comprehension    Assessment: pt cont with severe fluent aphasia. Pt does not follow simple/basic directions or answer basic personal yes/no questions appropriately. Verbal expression characterized by intermittent appropriate spontaneous utterances this date. However mostly marked by jargon and neologisms. Given severity of comprehension deficits, alternative means of communication not appropriate for pt at this time. Plan:  Continue speech/language therapy to address above goals, 3-5 x/week x LOS  DC recommendations: ongoing ST recommended  D/W nursing, [place]  Needs met prior to leaving room, call button in reach. Treatment time: 15 min speech    Randy Sutton M.S./[facility]-SLP #[id number]  Pg. # [phone]  If patient is discharged prior to next session, this note will serve as discharge summary.

## 2021-02-16 NOTE — CARE COORDINATION
Patient not able to answer questions appropriately. CM spoke to  daughter Salazar Roldan [phone] yesterday. Salazar Roldan stated family is agreeable to placement if necessary. Requests [facility] or [facility] if SNF level of care is needed. Follow up calls placed to both facilities. Awaiting return call. CM will continue to follow for discharge plans after therapy recs are placed. Tigist Cota RN  RN Case Manager  [phone]    Updated  CM placed multiple follow up calls to both [place] [id number] and [facility]. Both currently reviewing demographics and coverage. [facility] stated they would have a decision by tomorrow morning. CM will continue to follow for changes to discharge plans.   Tigist Cota RN  RN Case Manager  [phone]

## 2021-02-16 NOTE — DISCHARGE SUMMARY
INTERNAL MEDICINE DEPARTMENT AT [address] [place] [address]  DISCHARGE SUMMARY    Patient ID: Ace Lott                                             Discharge Date: 2/16/2021   Patient's PCP: Chasity Man MD                                          Discharge Physician: Natasha Cunningham MD  Admit Date: 2/13/2021   Admitting Physician: Merritt Person MD    PROBLEMS DURING HOSPITALIZATION:      DISCHARGE DIAGNOSES:  1. Acute Right MCA ischemic Stroke  2. Hypertensive Emergency  3. Acute Kidney Injury  4. Chronic diastolic heart failure  5. Chronic hypoxic respiratory failure  6. Chronic Obstructive Pulmonary Disease    Hospital Course:   Ace Lott is K 56 y. o. female with a PMHx significant for COPD (on5L of O2 at home), CAD s/p stent, DM, Hypothyroidism, and HLD who presented with altered mental status. Patient has expressive aphasia and all history was taken from patient's chart.  Per chart, patient presented to [facility] emergency department for evaluation of altered mental status.  Per EMS, patient lives at home by herself. Fatmata Davila time family check on her was 48 hours before her presentation to ED. Renny Massey mentioned that patient was talking in gibberish.  Per note, patient was not following commands in the ED. In [facility] ED, blood pressure was 219/99, heart rate 101, temperature 99.1 °F, respiratory rate 26. CT/CTA head was done and showed Acute ischemic infarct in the right middle cerebral artery territory, Acute thrombosis of the M1 segment right middle cerebral artery with paucity of contrast in distal branches, Severe stenosis of the bilateral posterior cerebral artery P2 segments.  [facility] stroke was consulted and patient was not a candidate for TPA or embolectomy. Therefore, patient was transferred to [facility] for admission to ICU before being transferred to [facility] in a stable condition. Pt has Dual chamber Medtronic PPM that is incompatible with MRI. BP control attempted with home lisinopril but pt developed ANA with Cr bump to 1.3 (baseline ~1) so lisinopril was held and norvasc was started instead. Pt was started on IV fluids for this as well as for poor PO intake per SLP. On the morning of discharge, pt was seen and examined. Pt's mentation is much more improved, more verbal though responses don't make sense. Pt followed thumbs up command when shown how to do it. Medtronic PPM was interrogated, and the representative stated that they cannot tell if pt goes in to arrythmia based on PPM settings. Ziopatch for 30 days was placed before discharge. Pt was discharged back to SNF in a stable condition. Physical Exam:  /81   Pulse 80   Temp 98.7 °F (37.1 °C) (Axillary)   Resp 16   Wt 184 lb 11.9 oz (83.8 kg)   SpO2 92%   BMI 33.79 kg/m²   Constitutional:       General: She is not in acute distress. Appearance: She is not ill-appearing, toxic-appearing or diaphoretic. Comments: Awake, sitting up in bed    HENT:      Head: Normocephalic and atraumatic. Eyes:      General: No scleral icterus. Right eye: No discharge. Left eye: No discharge. Comments: Unable to assess EOM   Neck:      Musculoskeletal: Neck supple. No neck rigidity. Cardiovascular: f     Rate and Rhythm: Normal rate and regular rhythm. Heart sounds: Normal heart sounds. No murmur. No friction rub. No gallop. Pulmonary:      Effort: Pulmonary effort is normal. No respiratory distress. Breath sounds: Normal breath sounds. No wheezing, rhonchi or rales. Abdominal:      General: Bowel sounds are normal. There is no distension. Palpations: Abdomen is soft. Tenderness: There is no abdominal tenderness. There is no guarding or rebound. Musculoskeletal:         General: No swelling or tenderness. Skin:     General: Skin is warm and dry. Coloration: Skin is not jaundiced or pale. Neurological:      Comments: Awake, unable to assess orientation. Severe receptive aphasia. Following command to thumbs up when shown how to do it. Does move all extremities spontaneously. Consults: Neurology, Critical Care  Significant Diagnostic Studies:  CT head, CTA head and neck, CXR  Treatments: Antihypertensives, IV hydration  Disposition: SNF  Discharged Condition: Stable  Follow Up:   Primary Care Physician (Dr. Mandy Bernal) in one week  Cardiology (Dr. Tarsha Wiggins) in 2 weeks  Neurology (Dr. Maris Whittaker) in 1 month      DISCHARGE MEDICATION:     Medication List      START taking these medications    amLODIPine 5 MG tablet  Commonly known as: NORVASC  Take 1 tablet by mouth daily        CONTINUE taking these medications    albuterol 90 MCG/ACT inhaler  Commonly known as: PROVENTIL;VENTOLIN  Inhale 2 puffs into the lungs every 6 hours as needed for Wheezing or Shortness of Breath. aspirin 81 MG chewable tablet     atorvastatin 80 MG tablet  Commonly known as: LIPITOR     clopidogrel 75 MG tablet  Commonly known as: PLAVIX     Compressor/Nebulizer Misc  1 Device by Does not apply route as needed (DX COPD J44.9 HUGO 99) [facility]     divalproex 250 MG DR tablet  Commonly known as: DEPAKOTE  Take 1 tablet by mouth nightly     docusate sodium 100 MG capsule  Commonly known as: COLACE  Take 1 capsule by mouth 2 times daily     esomeprazole 40 MG delayed release capsule  Commonly known as: NEXIUM     Fluticasone furoate-vilanterol 200-25 MCG/INH Aepb inhaler  Commonly known as: Breo Ellipta  USE 1 INHALATION DAILY     furosemide 40 MG tablet  Commonly known as: LASIX     HYDROcodone-acetaminophen  MG per tablet  Commonly known as: NORCO  Take 1 tablet by mouth every 6 hours as needed for Pain for up to 3 days.      Klor-Con 10 10 MEQ extended release tablet  Generic drug: potassium chloride     levothyroxine 125 MCG tablet Commonly known as: SYNTHROID  Take 1 tablet by mouth daily     lidocaine 5 %  Commonly known as: LIDODERM     LORazepam 0.5 MG tablet  Commonly known as: ATIVAN     metFORMIN 500 MG tablet  Commonly known as: GLUCOPHAGE     Nebulizer/Tubing/Mouthpiece Kit  1 kit by Does not apply route daily as needed (DX COPD J44.9 HUGO 99) [facility]     predniSONE 2.5 MG tablet  Commonly known as: DELTASONE  Take 3 tablets by mouth daily     rOPINIRole 0.5 MG tablet  Commonly known as: REQUIP     senna-docusate 8.6-50 MG per tablet  Commonly known as: PERICOLACE     vitamin B-12 1000 MCG tablet  Commonly known as: CYANOCOBALAMIN     vitamin D 1.25 MG (50502 UT) Caps capsule  Commonly known as: ERGOCALCIFEROL        STOP taking these medications    lisinopril 40 MG tablet  Commonly known as: PRINIVIL;ZESTRIL           Where to Get Your Medications      You can get these medications from any pharmacy    Bring a paper prescription for each of these medications  · amLODIPine 5 MG tablet  · divalproex 250 MG DR tablet  · HYDROcodone-acetaminophen  MG per tablet       Activity: activity as tolerated  Diet: diabetic diet, dysphagia minced and moist  Wound Care: none needed        Signed:  Zachariah Marinelli MD   2/16/2021                                                        Hospitalist Addendum           Addendum to Resident Note:   Patient seen and evaluated   I agree with findings, Assessment and plan documented as above except for below  CC on presentation:  CVA       A/p    Active Hospital Problems    Diagnosis Date Noted    Acute ischemic stroke ([facility] [address] [I63.9] 02/12/2021        Plan  CVA  Aspirin, Plavix, statin  Echocardiogram negative for any clot. Neurology was consulted. Monitor has been placed. Continue home medications for chronic medical problems.   Discharge to skilled nursing facility Spent at least 35 minutes for this visit of which includes interview with patient , physical examination,lab and/or imaging review, and decision-making. Greater than 50% time was spent on face to face encounter.        Josh Magaña  12:34 PM

## 2021-02-16 NOTE — PROGRESS NOTES
Internal Medicine  Progress Note  PGY-1    Hospital Day: 4                                                      Admit Date: 2/13/2021                                     PCP: Harjinder Rosales MD      CC: AMS                      Interval Hx:   No acute events overnight. -166/65-78, but pt did not get her norvasc yesterday for soft BP. Pt was seen and examined at bedside. Pt is much more verbal today, but they do not make sense nor does she answer questions appropriatly. Says \"yes\" to all ROS. Pt was able to give thumbs up when shown what to do . HPI:  Patient is a 78 y.o. female with a PMHx significant for COPD (on5L of O2 at home), CAD s/p stent, DM, Hypothyroidism, and HLD presented with altered mental status. In [place] [address] ED, blood pressure was 219/99, heart rate 101, temperature 99.1 °F, respiratory rate 26. CT/CTA head was done and showed Acute ischemic infarct in the right middle cerebral artery territory, Acute thrombosis of the M1 segment right middle cerebral artery with paucity of contrast in distal branches, Severe stenosis of the bilateral posterior cerebral artery P2 segments.  stroke was consulted and patient was not a candidate for TPA or embolectomy. Therefore, patient was transferred to [facility] for admission to ICU.     Daily Plan:  2/16/2021    Medications:    Scheduled Meds:   amLODIPine  5 mg Oral Daily    budesonide  0.5 mg Nebulization BID    Arformoterol Tartrate  15 mcg Nebulization BID    rOPINIRole  0.5 mg Oral Nightly    HYDROcodone-acetaminophen  1 tablet Oral Q6H    atorvastatin  80 mg Oral Daily    sodium chloride flush  10 mL Intravenous 2 times per day    insulin lispro  0-12 Units Subcutaneous TID WC    insulin lispro  0-6 Units Subcutaneous Nightly    heparin (porcine)  5,000 Units Subcutaneous 3 times per day    ipratropium-albuterol  1 vial Inhalation 4x daily    insulin glargine  5 Units Subcutaneous Nightly    aspirin  81 mg Oral Daily  clopidogrel  75 mg Oral Daily    levothyroxine  125 mcg Oral Daily    pantoprazole  40 mg Oral QAM AC    predniSONE  7.5 mg Oral Daily    traZODone  50 mg Oral Nightly    divalproex  250 mg Oral Nightly    [Held by provider] lisinopril  40 mg Oral Daily      Continuous Infusions:   sodium chloride 75 mL/hr at 02/15/21 1053    dextrose       PRN Meds:LORazepam, albuterol, sodium chloride flush, polyethylene glycol, promethazine **OR** ondansetron, acetaminophen **OR** acetaminophen, glucose, dextrose, glucagon (rDNA), dextrose, perflutren lipid microspheres, labetalol    Allergies: Allergies   Allergen Reactions    Buspar [Buspirone Hcl]          Physical Exam:     Vitals: BP (!) 153/65   Pulse 86   Temp 99.1 °F (37.3 °C) (Axillary)   Resp 18   Wt 184 lb 11.9 oz (83.8 kg)   SpO2 91%   BMI 33.79 kg/m²     I/O:      Intake/Output Summary (Last 24 hours) at 2/16/2021 0758  Last data filed at 2/15/2021 1300  Gross per 24 hour   Intake 60 ml   Output    Net 60 ml     Physical Exam  Constitutional:       General: She is not in acute distress. Appearance: She is not ill-appearing, toxic-appearing or diaphoretic. Comments: Awake, sitting up in bed    HENT:      Head: Normocephalic and atraumatic. Eyes:      General: No scleral icterus. Right eye: No discharge. Left eye: No discharge. Comments: Unable to assess EOM   Neck:      Musculoskeletal: Neck supple. No neck rigidity. Cardiovascular:      Rate and Rhythm: Normal rate and regular rhythm. Heart sounds: Normal heart sounds. No murmur. No friction rub. No gallop. Pulmonary:      Effort: Pulmonary effort is normal. No respiratory distress. Breath sounds: Normal breath sounds. No wheezing, rhonchi or rales. Abdominal:      General: Bowel sounds are normal. There is no distension. Palpations: Abdomen is soft. Tenderness: There is no abdominal tenderness. There is no guarding or rebound. Musculoskeletal:         General: No swelling or tenderness. Skin:     General: Skin is warm and dry. Coloration: Skin is not jaundiced or pale. Neurological:      Comments: Awake, unable to assess orientation. Severe receptive aphasia. Following command to thumbs up when shown how to do it. Does move all extremities spontaneously. DATA:       Labs  CBC:   Recent Labs     02/14/21 0434 02/15/21  0542 02/16/21  0546   WBC 13.3* 9.1 8.1   HGB 12.7 13.3 12.4   HCT 40.5 41.3 38.7    267 244       BMP:   Recent Labs     02/14/21 0434 02/15/21  0542 02/16/21  0546    143 144   K 3.8 3.7 3.7    101 106   CO2 28 32 28   BUN 28* 25* 20   CREATININE 1.1 1.3* 1.0   GLUCOSE 144* 131* 113*     LFT's:   No results for input(s): AST, ALT, ALB, BILITOT, ALKPHOS in the last 72 hours. Troponin:   No results for input(s): TROPONINI in the last 72 hours. INR:   No results for input(s): INR in the last 72 hours. Urinalysis:  Lab Results   Component Value Date    NITRU Negative 02/12/2021    WBCUA 0-2 02/12/2021    BACTERIA Rare 07/06/2017    RBCUA 5-10 02/12/2021    BLOODU TRACE-INTACT 02/12/2021    SPECGRAV 1.010 02/12/2021    GLUCOSEU Negative 02/12/2021       Radiology:  CT head without contrast   Final Result        Evolving right MCA territory infarct. No hemorrhage, herniation, or mass-   effect. ASSESSMENT AND PLAN:   Danielle Hassan a 78 y. o. female with PMHx significant for COPD (on 5L of O2), CAD s/p stent, DM, Hypothyroidism, and HLD presented with altered mental status.      Acute R MCA Ischemic stroke [facility]):  Repeat CT head yesterday with evolving infarct, no hemorrhage. ECHO with no evidence of PFO. TChol 223//HDL 34/, A1C 6.1.  Pt's pacemaker is incompatible with MRI.  - Neurology consulted, apprec recs  - Goal BP now 140/90  - ASA, plavix, lipitor  - Telemetry - Medtronic interrogation (2/16): cannot detect arrhythmia on current PPM settings; will place Zio Patch before discharge tomorrow  - SLP (2/15): mild-moderate pocketing; continue dysphagia minced and moist + thins (no straws), 1:1 feed recommended d/t pt's cognitive-linguistic impairments, oral care with suction kit ONLY  - PT/OT/SLP     Hypertensive Emergency   -166/65-78 overnight. Did not get norvasc yesterday for soft BP.  - Hold home lisinopril - ANA  - Started norvasc 5 mg daily  - PRN labetalol      ANA, resolved  Cr 1.3 after starting lisinopril (baseline ~1) > 1.0 this AM.  - Hold home lisinopril - ANA  - Started norvasc 5 mg daily  - Discontinued IVF    Chronic Diastolic Heart Failure  No s/s acute exacerbation, ECHO with EF 65-70%  - Daily weights  - Strict I/O     DM II  Last Hemoglobin A1c 6.2 (07/11/2020).   Takes Metformin 1000 mg twice daily at home. Hgb A1c 6.1 (2/14/21). - Lantus 5 units nightly  - MDSSI  - Hypoglycemia protocol  - Accuchecks AC/HS     Hypothyroidism  TSH 2/12/21 1.45.  - Continue home synthroid      Chronic Hypoxic Respiratory Failure 2/2 COPD  She is on 5 L of oxygen and BREO at home.   - Supplemental O2  - DuoNeb, Brovana, Pulmicort  - Prednisone 7.5 mg daily     CAD s/p stent:  - ASA/Plavix, Lipitor     Psych  Valproic acid level < 2.8  - Depakote 250 mg nightly  - Trazodone 50 mg nightly      Code Status:Full Code  FEN: DIET DYSPHAGIA MINCED AND MOIST; Carb Control: 4 carb choices (60 gms)/meal  PPX: SubQ heparin  DISPO: [facility], pending precert, likely discharge tomorrow  PT/OT Eval Status: Consulted     I will discuss the patient with Benjamin Concepcion MD  -----------------------------  Bonnie Rogers MD  Internal Medicine Resident, PGY-1

## 2021-02-16 NOTE — PROGRESS NOTES
Occupational Therapy  Facility/Department: [facility] 5T ORTHO/NEURO  Daily Treatment Note  NAME: Kay La  : 1945  MRN: [id number]    Date of Service: 2021    Discharge Recommendations: Kay La scored a 10/24 on the AM-PAC ADL Inpatient form. Current research shows that an AM-PAC score of 17 or less is typically not associated with a discharge to the patient's home setting. Based on the patient's AM-PAC score and their current ADL deficits, it is recommended that the patient have 3-5 sessions per week of Occupational Therapy at d/c to increase the patient's independence. Please see assessment section for further patient specific details. If patient discharges prior to next session this note will serve as a discharge summary. Please see below for the latest assessment towards goals. OT Equipment Recommendations  Equipment Needed: (defer)    Assessment   Performance deficits / Impairments: Decreased functional mobility ; Decreased ADL status; Decreased safe awareness;Decreased cognition;Decreased posture;Decreased balance;Decreased coordination;Decreased endurance  Assessment: Tx made difficult by pt's resistance & confusion. Pt able to string more sentences together this session, but still aphasic and exhibiting irritated behavior. Pt's alertness/understanding of situation improved sitting EOB, but standing balance was so poor that it was unsafe to continue activity. Currently requires 24 hr physical assist, and x2 assist for all ADL transfers. Pt would benefit form cont. IP OT services to maximize functional independence & safety. Cont. per POC.   Treatment Diagnosis: Impaired ADLs & transfers  Prognosis: Good  OT Education: OT Role;Plan of Care;Transfer Training;Energy Conservation;Orientation  Patient Education: pt showing some understanding via body language & facial expression of VCs; req. reinforcement & repetition, low understanding this session of all commands REQUIRES OT FOLLOW UP: Yes  Activity Tolerance  Activity Tolerance: Treatment limited secondary to decreased cognition;Treatment limited secondary to agitation  Activity Tolerance: Initially resisting transfers & mobility; then resiting therapist's attempts to remain in static stand; unsafe to pursue/progress functional mobility; therapist terminated standing activity  Safety Devices  Safety Devices in place: Yes  Type of devices: Bed alarm in place;Call light within reach; Left in bed;Nurse notified         Patient Diagnosis(es): The primary encounter diagnosis was Chronic bilateral low back pain without sciatica. A diagnosis of Pacemaker was also pertinent to this visit. has a past medical history of Arthritis, Bipolar depression ([facility] [address] CAD (coronary artery disease), CHF (congestive heart failure) ([facility] [address] COPD (chronic obstructive pulmonary disease) ([facility] [address] Depression, Diabetes mellitus ([facility] [address] Encounter for imaging to screen for metal prior to MRI, Hyperlipidemia, Oxygen dependent, Pneumonia, Thyroid disease, and Unspecified cerebral artery occlusion with cerebral infarction. has a past surgical history that includes Cholecystectomy; Tubal ligation; Hysterectomy (1975); Tonsillectomy; Appendectomy; pacemaker placement; vascular surgery; Colonoscopy; other surgical history (5/28/13); Pacemaker insertion; Cardiac surgery; Cardiac surgery; Cardiac pacemaker placement; and Diagnostic Cardiac Cath Lab Procedure.     Restrictions  Restrictions/Precautions  Restrictions/Precautions: Up as Tolerated  Position Activity Restriction  Other position/activity restrictions: up as tolerated  Subjective   General  Chart Reviewed: Yes  Patient assessed for rehabilitation services?: Yes  Additional Pertinent Hx: Hx = COPD (wears 5L O2 at home, per chart); CHF, DM, CAD, HLD, thyroid disease, bipolar/depression, cardiac pacemaker  Response to previous treatment: Patient with no complaints from previous session Family / Caregiver Present: No  Referring Practitioner: Tona Osborne MD  Diagnosis: 2/12 admit to ED, lives alone and family found her w/ AMS (speaking gibberish, not following commands). CT head found evolving right MCA territory infarct, no hemorrhage. Subjective  Subjective: Pt in bed on arrival sleeping and easy to arouse. Pt aphasic but able to make out some sentences clearly: \"What in the world? \" (re: sitting EOB). Pt made clear with nonverbal language that she was not interested in transferring to chair. .  General Comment  Comments: Exhibited irritated behavior, making crying noises and difficult to console. Increased arousal/alertness sitting EOB, stopped crying but later on seemed confused about what was happening and slumped, not participating in any further physical activity/bed mobility.  Pt making soft crying noises end of session, but resting comfortably in bed  Patient Currently in Pain: (DANDRE 2/2 aphasia; pt seems comfortable)   Orientation  Orientation  Overall Orientation Status: Impaired(DANDRE 2/2 aphasia; pt seemed confused about person, place & situation)  Objective    ADL  Grooming: Maximum assistance(unable to use comb, even with [place])  LE Dressing: Dependent/Total(x2 assist: pt was confused and unable to participate in any components or maintain balance in stance; had to complete pants management in bed via rolling)        Balance  Sitting Balance: (initially max + min, then progressing to CGA x1 when pt more alert & aware of situation)  Standing Balance: (required up to Max + Mod, heavy lean to L side)  Standing Balance  Time: ~20 seconds  Activity: attempting pants management  Comment: pt attempting to step forward but unable to maintain static standing balance; pt extremely unsteady and requiring heavy assist to maintain balance and assist pt back to sitting EOB  Functional Mobility  Functional Mobility Comments: not safe to attempt this session  Bed mobility Rolling to Left: (mod x2, pants management)  Rolling to Right: (mod x2; pants management)  Supine to Sit: 2 Person assistance;Dependent/Total  Sit to Supine: Dependent/Total;2 Person assistance  Scooting: Contact guard assistance(req'd A for balance but initiated when she became more aroused/aware)  Comment: pt w/ decreased awareness/understanding of situation and req'd total A for bed mobility: arousal improved sitting EOB  Transfers  Sit to stand: Minimal assistance(impulsively stands)  Stand to sit: 2 Person assistance(req'd x2 assist to sit pt back to bed as she was resisting and confused (max + mod))                       Cognition  Overall Cognitive Status: Exceptions  Arousal/Alertness: Delayed responses to stimuli  Following Commands: Inconsistently follows commands(less than 30% simple VC/TCs when provided on R side of body)  Attention Span: Difficulty dividing attention; Difficulty attending to directions  Safety Judgement: Decreased awareness of need for assistance;Decreased awareness of need for safety  Problem Solving: Assistance required to generate solutions;Assistance required to implement solutions;Assistance required to identify errors made  Initiation: Requires cues for all  Sequencing: Requires cues for all  Cognition Comment: decreased cognitive function this session, likely 2/2 decreased arousal and pt waking from nap at start of session. Pt responding to fewer VCs, however responds to cues to initiate mobility with firm \"no. \" When upset, she scans laterally but does not make eye contact with therapist in front of her despite VC/TC.      Perception  Overall Perceptual Status: Impaired  Unilateral Attention: Cues to attend left visual field(mild L side neglect, however pt does scan both sides)                                   Plan   Plan  Times per week: 5-7x  Times per day: Daily Current Treatment Recommendations: Balance Training, Functional Mobility Training, Endurance Training, Safety Education & Training, Self-Care / ADL, Cognitive Reorientation, Neuromuscular Re-education  G-Code     OutComes Score                                                  AM-PAC Score        AM-PAC Inpatient Daily Activity Raw Score: 10 (02/16/21 1544)  AM-PAC Inpatient ADL T-Scale Score : 27.31 (02/16/21 1544)  ADL Inpatient CMS 0-100% Score: 74.7 (02/16/21 1544)  ADL Inpatient CMS G-Code Modifier : CL (02/16/21 1544)    Goals  Short term goals  Time Frame for Short term goals: discharge  Short term goal 1: Pt will don brief at Mod A level - not met 2/16  Short term goal 2: Pt will complete BSC t/f at [facility] A x1 level - not met 2/16  Short term goal 3: Pt will complete grooming ADL w/ appropriate use of objects, min verbal cues - not met 2/16  Patient Goals   Patient goals : none stated       Therapy Time   Individual Concurrent Group Co-treatment   Time In 1341         Time Out 1420         Minutes 44              Therapist was present, directed the patient's care, made skilled judgement, was responsible for assessment and treatment of the patient.     Sacha Walsh, s/OT  Demarco Maier, OTR/L [id number]

## 2021-02-16 NOTE — PROGRESS NOTES
Speech Language Pathology  Facility/Department: [facility] 5T  Dysphagia Daily Treatment Note    NAME: Landon Osler  : 1945  MRN: [id number]    Patient Diagnosis(es):   Patient Active Problem List    Diagnosis Date Noted    Acute-on-chronic respiratory failure ([facility] [address] 2012     Priority: High    Pneumonia 01/10/2012     Priority: High    Hyperkalemia 2012     Priority: Medium    Sepsis ([facility] [address] 2012     Priority: Medium    Chronic obstructive pulmonary disease (HCC)      Priority: Medium    CAD (coronary artery disease)      Priority: Medium    Type II diabetes mellitus, well controlled ([facility] [address]      Priority: Low    Acute ischemic stroke ([facility] [address] 2021    Chronic bilateral low back pain without sciatica     Chronic ischemic heart disease     Pacemaker     Chronic respiratory failure with hypoxia ([facility] [address]     MDD (major depressive disorder), single episode 2020    Chest pain 2016    Chest pressure     Shortness of breath     History of coronary artery disease     CAP (community acquired pneumonia)     Pleural effusion 2012     Allergies: Allergies   Allergen Reactions    Buspar [Buspirone Hcl]        CXR (21)  Apical predominant emphysema.       No acute cardiopulmonary disease. Previous MBS  None  Chart reviewed.   Medical Diagnosis: CVA  Treatment Diagnosis: dysphagia    BSE Impression (21)  Dysphagia Diagnosis: Mild oral stage dysphagia Dysphagia Impression : Patient presents with mild oropharyngeal dysphagia. Unable to complete oral motor exam d/t receptive aphasia. With pt positioned upright in bed, analyzed tolerance ice chips, thins via tsp/straw/cup (3oz), puree, and soft solid. Pt with positive oral acceptance, mildly prolonged mastication, positive swallow movement, good oral clearance, no overt signs of aspiration or penetration. Pt noted to be somewhat impulsive with straw, and commented \"that almost went down wrong! \". Recommend dysphagia 2 minced & moist solids / thins via cup (avoid straw), meds in puree; as tolerated. Will continue to follow. MBS results  Pt not appropriate to participate at this time given absent direction following and inconsistent willingness to take PO    Pain: none indicated via any means    Current Diet :  Dysphagia minced and moist / thin liquids    Treatment:  Pt seen bedside to address the following goals:  Goal 1: Patient will tolerate least restrictive diet without overt signs of aspiration or associated decline in respiratory status. 2/14: Pt in bed upon entry - RN reported pt attempting to get OOB and not following directions this AM. Pt with severe fluent aphasia, unable at this time to answer questions appropriately or follow commands despite MAX cues. Pt with breakfast tray in front of her with no initiation to take PO despite MAX cues. SLP administered boluses of minced and moist solid x 1, puree x 4, and sips of thins via cup edge. Pt w/o ability this AM to use straw appropriately and w/o recognition of any objects, frequently questioning \"what's that? \" and appeared to demonstrate no comprehension when informed of item name. Pt with agitation with encouragement to take PO and adamantly stating \"no no no\" at attempts of providing oral care. Mastication of minced and moist solid very prolonged with eventual throat clear and expectoration of part of bolus - unable to determine if d/t cognitive linguistic status negatively impacting swallow vs dislike of PO. Provided pt soft solid trial and pt indicated interest but then dunked solid in cream of wheat and completely submerged w/o apparent awareness. Pt attempting to use straw as spoon and sticking fingers in liquids w/o apparent comprehension to take cup and take sips via cup edge despite MAX cues at times. No overt s/s associated with aspiration exhibited with any trials although assessment limited d/t pt accepting only minimal trials despite max encouragement. Recommend continue minced and moist solids + thin liquids to offer pt greater variety of PO in effort for improved intake; however, may need to consider changing diet to puree vs full liquid if difficulty with solids persists.  Continue goal. 2/15: Pt in bed with breakfast tray in front of her. Reduced agitation noted during session this date although still inconsistently accepting PO. Pt continues with severe fluent aphasia and unable at this time to follow any directions or answer comprehension questions appropriately. Continues with dependency to use utensils appropriate and take PO correctly (eg perseverating on attempting to eat coffee with fork despite MAX cues). Analyzed pt with minced and moist solids, soft and bite sized solids, and thins via cup edge. Oral prep of solids prolonged with mild-mod pocketing in L buccal cavity w/o awareness. When SLP completed finger sweep, pt did not propel residue posteriorly but continuously expectorated. Pt with cough and suspected regurgitation of solids x 1; pt also with grimacing and apparent belching / esophageal discomfort with liquids x 2 - ? if esophageal dysfunction. Oral care completed with toothbrush and toothpaste at end of session - dependent for SLP to complete d/t absent direction following. Residue from dinner meal found in upper L buccal cavity. Pt is unable at this time to follow directions / motor plan for expectoration of toothpaste or complete swish / spit, resulting in swallowing residue from oral care. Recommend only completing oral care with suction kit at this time. Continue dysphagia minced and moist + thin liquids. Continue goal.  2/16: Pt analyzed with puree, thin liquids via cup/straw, soft solids and medication presented by RN, crushed in puree. Pt exhibited cough with initial sip of water via cup. No other instances occurred via cup and straw, including consecutive swallows by straw (no difficulty with straw this date), voice remained clear. Prolonged mastication with soft solids, though cleared oral cavity effectively, no pocketing/residue noted. Pt consumed medications crushed in puree,  per RN without difficulty.    Goal met, cont to ensure consistency Goal 2: Patient/caregiver will demonstrate understanding of swallow function/recommendations. 2/14: Educated pt to role of SLP, purpose of visit, importance of PO intake for recovery s/p CVA, concerns for dysphagia, and risks of choking / aspiration. Pt demonstrates no comprehension. D/C GOAL - not indicated given pt's cognitive lingusitic status. Patient/Family/Caregiver Education:  Attempted to educate pt to purpose of visit, s/s of aspiration, rationale for diet and strategies. Pt does not demonstrate comprehension through any means    Compensatory Strategies: 1:1 assistance  Alternate solids and liquids  Upright as possible for all oral intake  Small bites/sips  Eat/Feed slowly  Check for oral residue/pocketing    Plan:  Continued daily Dysphagia treatment with goals per  plan of care. Diet recommendations: continue dysphagia minced and moist + thins   Aggressive oral care with suction toothbrush 2-3 x/day  DC recommendation: ongoing tx indicated at this time  Treatment: 15 min  D/W nursing Peg Cheryl  Needs met prior to leaving room, call button in reach. Oskar Navarrete M.S./CCC-SLP #[id number]  Pg.  # [phone]  If patient is discharged prior to next treatment, this note will serve as the discharge summary

## 2021-02-16 NOTE — PLAN OF CARE
Problem: Skin Integrity:  Goal: Will show no infection signs and symptoms  Description: Will show no infection signs and symptoms  2/16/2021 0107 by Annie Wagner RN  Outcome: Ongoing     Problem: Falls - Risk of:  Goal: Will remain free from falls  Description: Will remain free from falls  2/16/2021 0107 by Annie Wagner RN  Outcome: Ongoing     Problem: HEMODYNAMIC STATUS  Goal: Patient has stable vital signs and fluid balance  2/16/2021 0107 by Annie Wagner RN  Outcome: Ongoing

## 2021-02-16 NOTE — PROGRESS NOTES
 DIAGNOSTIC CARDIAC CATH LAB PROCEDURE      HYSTERECTOMY  1975    partial    OTHER SURGICAL HISTORY  5/28/13    quadroscopy    PACEMAKER INSERTION      PACEMAKER PLACEMENT      TONSILLECTOMY      TUBAL LIGATION      VASCULAR SURGERY      stent placement       Level of Consciousness: Alert, Oriented, and Cooperative = 0 unable to communicate   Level of Activity: Walking with assistance = 1    Respiratory Pattern: Regular Pattern; RR 8-20 = 0    Breath Sounds: Diminished unilaterally = 1    Sputum   ,  , Sputum How Obtained: Spontaneous cough  Cough: Strong, spontaneous, non-productive = 0    Vital Signs   /81   Pulse 80   Temp 98.7 °F (37.1 °C) (Axillary)   Resp 16   Wt 184 lb 11.9 oz (83.8 kg)   SpO2 92%   BMI 33.79 kg/m²   SPO2 (COPD values may differ): Greater than or equal to 92% on room air = 0    Peak Flow (asthma only): not applicable = 0    RSI: 0-4 = See once and convert to home regimen or discontinue        Plan       Goals: medication delivery, mobilize retained secretions, volume expansion and improve oxygenation    Patient/caregiver was educated on the proper method of use for Respiratory Care Devices:  Yes      Level of patient/caregiver understanding able to:   ? Verbalize understanding   ? Demonstrate understanding       ? Teach back        ? Needs reinforcement       ? No available caregiver               ? Other:     Response to education:  Good     Is patient being placed on Home Treatment Regimen? Yes     Does the patient have everything they need prior to discharge? NA     Comments: reviewed chart and assessed pt     Plan of Care: Change duo to prn    Electronically signed by Sue Anne RCP on 2/16/2021 at 12:01 PM    Respiratory Protocol Guidelines     1. Assessment and treatment by Respiratory Therapy will be initiated for medication and therapeutic interventions upon initiation of aerosolized medication. 2. Physician will be contacted for respiratory rate (RR) greater than 35 breaths per minute. Therapy will be held for heart rate (HR) greater than 140 beats per minute, pending direction from physician. 3. Bronchodilators will be administered via Metered Dose Inhaler (MDI) with spacer when the following criteria are met:  a. Alert and cooperative     b. HR < 140 bpm  c. RR < 30 bpm                d. Can demonstrate a 23 second inspiratory hold  4. Bronchodilators will be administered via Hand Held Nebulizer ANTOINETTE [facility]) to patients when ANY of the following criteria are met  a. Incognizant or uncooperative          b. Patients treated with HHN at Home        c. Unable to demonstrate proper use of MDI with spacer     d. RR > 30 bpm   5. Bronchodilators will be delivered via Metered Dose Inhaler (MDI), HHN, Aerogen to intubated patients on mechanical ventilation. 6. Inhalation medication orders will be delivered and/or substituted as outlined below. Aerosolized Medications Ordering and Administration Guidelines:    1. All Medications will be ordered by a physician, and their frequency and/or modality will be adjusted as defined by the patients Respiratory Severity Index (RSI) score. 2. If the patient does not have documented COPD, consider discontinuing anticholinergics when RSI is less than 9.  3. If the bronchospasm worsens (increased RSI), then the bronchodilator frequency can be increased to a maximum of every 4 hours. If greater than every 4 hours is required, the physician will be contacted. 4. If the bronchospasm improves, the frequency of the bronchodilator can be decreased, based on the patient's RSI, but not less than home treatment regimen frequency. 5. Bronchodilator(s) will be discontinued if patient has a RSI less than 9 and has received no scheduled or as needed treatment for 72  Hrs. Patients Ordered on a Mucolytic Agent:    1. Must always be administered with a bronchodilator. 2. Discontinue if patient experiences worsened bronchospasm, or secretions have lessened to the point that the patient is able to clear them with a cough. Anti-inflammatory and Combination Medications:    1. If the patient lacks prior history of lung disease, is not using inhaled anti-inflammatory medication at home, and lacks wheezing by examination or by history for at least 24 hours, contact physician for possible discontinuation.

## 2021-02-16 NOTE — DISCHARGE INSTR - COC
 Pneumococcal Conjugate 7-valent (Prevnar7) 01/10/2007    Pneumococcal Polysaccharide (Pxrvpliyx10) 07/19/2016       Active Problems:  Patient Active Problem List   Diagnosis Code    Pneumonia J18.9    Chronic obstructive pulmonary disease ([facility] J44.9    Type II diabetes mellitus, well controlled ([facility] E11.9    CAD (coronary artery disease) I25.10    Acute-on-chronic respiratory failure ([facility]) J96.20    Sepsis ([facility]) A41.9    Pleural effusion J90    Hyperkalemia E87.5    CAP (community acquired pneumonia) J18.9    Chest pain R07.9    Chest pressure R07.89    Shortness of breath R06.02    History of coronary artery disease Z86.79    MDD (major depressive disorder), single episode F32.9    Chronic ischemic heart disease I25.9    Pacemaker Z95.0    Chronic respiratory failure with hypoxia ([facility]) J96.11    Chronic bilateral low back pain without sciatica M54.5, G89.29    Acute ischemic stroke ([facility]) I63.9       Isolation/Infection:   Isolation            No Isolation          Patient Infection Status       Infection Onset Added Last Indicated Last Indicated By Review Planned Expiration Resolved Resolved By    COVID-19 Rule Out 02/16/21 02/16/21 02/16/21 COVID-19 (Ordered) 02/23/21 03/02/21      Resolved    COVID-19 Rule Out 02/12/21 02/12/21 02/12/21 COVID-19 (Ordered)   02/12/21 Rule-Out Test Resulted    COVID-19 Rule Out 07/09/20 07/09/20 07/09/20 COVID-19 (Ordered)   07/10/20 Rule-Out Test Resulted            Nurse Assessment:  Last Vital Signs: /81   Pulse 80   Temp 98.7 °F (37.1 °C) (Axillary)   Resp 16   Wt 184 lb 11.9 oz (83.8 kg)   SpO2 92%   BMI 33.79 kg/m²     Last documented pain score (0-10 scale): Pain Level: 0  Last Weight:   Wt Readings from Last 1 Encounters:   02/13/21 184 lb 11.9 oz (83.8 kg)     Mental Status:  oriented to self only    IV Access:  - None    Nursing Mobility/ADLs:  Walking   Dependent  Transfer  Dependent  Bathing  Dependent  Dressing  Dependent Toileting  Dependent  Feeding  Assisted  Med Admin  Dependent  Med Delivery   crushed    Wound Care Documentation and Therapy:        Elimination:  Continence:   · Bowel: No  · Bladder: No  Urinary Catheter: None   Colostomy/Ileostomy/Ileal Conduit: No       Date of Last BM: 2/13/2021- Gave Miralax today 2/17/2021    Intake/Output Summary (Last 24 hours) at 2/16/2021 1207  Last data filed at 2/16/2021 0936  Gross per 24 hour   Intake 70 ml   Output    Net 70 ml     I/O last 3 completed shifts: In: 61 [P.O.:60]  Out: -     Safety Concerns: At Risk for Falls, Aspiration    Impairments/Disabilities:      Speech    Nutrition Therapy:  Current Nutrition Therapy:   - Oral Diet:  Dysphagia 2 mechanically altered    Routes of Feeding: Oral  Liquids: Thin Liquids  Daily Fluid Restriction: no  Last Modified Barium Swallow with Video (Video Swallowing Test): {Done Not Done OCXR:[id number]:::9}    Treatments at the Time of Hospital Discharge:   Respiratory Treatments: HHN  Oxygen Therapy:  2L per N/C  Ventilator:    - No ventilator support    Rehab Therapies: Physical Therapy, Occupational Therapy and Speech/Language Therapy  Weight Bearing Status/Restrictions: No weight bearing restirctions  Other Medical Equipment (for information only, NOT a DME order):  wheelchair  Other Treatments:     Patient's personal belongings (please select all that are sent with patient):   Upper denture Plate, socks    RN SIGNATURE:  [facility] [place] Reena RN    CASE MANAGEMENT/SOCIAL WORK SECTION    Inpatient Status Date: 2/12/2021    Readmission Risk Assessment Score:  Readmission Risk              Risk of Unplanned Readmission:        21       Discharging to Facility/ [facility]: [address] [place] [address]      Report Phone: [phone]      Fax Fax: [phone]            / signature: Electronically signed by Cat Claudio RN on 2/17/21 at 12:43 PM [facility]    PHYSICIAN SECTION Prognosis: Fair    Condition at Discharge: Stable    Rehab Potential (if transferring to Rehab): Good    Recommended Labs or Other Treatments After Discharge:     PT/OT, daily SLP  Supplemental O2  Hold Lasix until 2/22/2021, then resume. Physician Certification: I certify the above information and transfer of Shakira Mary  is necessary for the continuing treatment of the diagnosis listed and that she requires [place] for greater or less 30 days.      Update Admission H&P: No change in H&P    PHYSICIAN SIGNATURE:  Electronically signed by Jack Antunez MD/ Flavia Jason MD on 2/16/21 at 12:08 PM EST

## 2021-02-16 NOTE — PROGRESS NOTES
Device interrogation by company representative. See interrogation for further details. 2/13/2021 - present (3 days)  [facility]. Floor check at [facility]. Looking for AFib for stroke but shes set vvi 50 with no recorded events. Pt follows w/ Dr Cristina Solorzano @ [facility]. See PACEART report under Cardiology tab.

## 2021-02-16 NOTE — PROGRESS NOTES
Patient refusing medications. Patient will not allow nurse to get blood sugar as well. Will try again later in shift. Will continue to monitor.

## 2021-02-17 ENCOUNTER — NURSE ONLY (OUTPATIENT)
Dept: CARDIOLOGY CLINIC | Age: 76
End: 2021-02-17

## 2021-02-17 VITALS
OXYGEN SATURATION: 96 % | DIASTOLIC BLOOD PRESSURE: 84 MMHG | WEIGHT: 184.75 LBS | TEMPERATURE: 97.8 F | BODY MASS INDEX: 33.79 KG/M2 | SYSTOLIC BLOOD PRESSURE: 139 MMHG | HEART RATE: 91 BPM | RESPIRATION RATE: 18 BRPM

## 2021-02-17 LAB
ANION GAP SERPL CALCULATED.3IONS-SCNC: 11 MMOL/L (ref 3–16)
BASOPHILS ABSOLUTE: 0.1 K/UL (ref 0–0.2)
BASOPHILS RELATIVE PERCENT: 0.6 %
BUN BLDV-MCNC: 17 MG/DL (ref 7–20)
CALCIUM SERPL-MCNC: 9.1 MG/DL (ref 8.3–10.6)
CHLORIDE BLD-SCNC: 105 MMOL/L (ref 99–110)
CO2: 26 MMOL/L (ref 21–32)
CREAT SERPL-MCNC: 1 MG/DL (ref 0.6–1.2)
EOSINOPHILS ABSOLUTE: 0.2 K/UL (ref 0–0.6)
EOSINOPHILS RELATIVE PERCENT: 1.7 %
GFR AFRICAN AMERICAN: >60
GFR NON-AFRICAN AMERICAN: 54
GLUCOSE BLD-MCNC: 105 MG/DL (ref 70–99)
GLUCOSE BLD-MCNC: 119 MG/DL (ref 70–99)
GLUCOSE BLD-MCNC: 96 MG/DL (ref 70–99)
HCT VFR BLD CALC: 39 % (ref 36–48)
HEMOGLOBIN: 12.5 G/DL (ref 12–16)
LYMPHOCYTES ABSOLUTE: 2.4 K/UL (ref 1–5.1)
LYMPHOCYTES RELATIVE PERCENT: 25 %
MAGNESIUM: 2 MG/DL (ref 1.8–2.4)
MCH RBC QN AUTO: 28.5 PG (ref 26–34)
MCHC RBC AUTO-ENTMCNC: 32 G/DL (ref 31–36)
MCV RBC AUTO: 89 FL (ref 80–100)
MONOCYTES ABSOLUTE: 0.8 K/UL (ref 0–1.3)
MONOCYTES RELATIVE PERCENT: 8.7 %
NEUTROPHILS ABSOLUTE: 6.1 K/UL (ref 1.7–7.7)
NEUTROPHILS RELATIVE PERCENT: 64 %
PDW BLD-RTO: 14.5 % (ref 12.4–15.4)
PERFORMED ON: ABNORMAL
PERFORMED ON: NORMAL
PLATELET # BLD: 224 K/UL (ref 135–450)
PMV BLD AUTO: 8 FL (ref 5–10.5)
POTASSIUM SERPL-SCNC: 3.9 MMOL/L (ref 3.5–5.1)
RBC # BLD: 4.38 M/UL (ref 4–5.2)
SODIUM BLD-SCNC: 142 MMOL/L (ref 136–145)
WBC # BLD: 9.5 K/UL (ref 4–11)

## 2021-02-17 PROCEDURE — 2700000000 HC OXYGEN THERAPY PER DAY

## 2021-02-17 PROCEDURE — 94761 N-INVAS EAR/PLS OXIMETRY MLT: CPT

## 2021-02-17 PROCEDURE — 97530 THERAPEUTIC ACTIVITIES: CPT

## 2021-02-17 PROCEDURE — 80048 BASIC METABOLIC PNL TOTAL CA: CPT

## 2021-02-17 PROCEDURE — 36415 COLL VENOUS BLD VENIPUNCTURE: CPT

## 2021-02-17 PROCEDURE — 6370000000 HC RX 637 (ALT 250 FOR IP): Performed by: STUDENT IN AN ORGANIZED HEALTH CARE EDUCATION/TRAINING PROGRAM

## 2021-02-17 PROCEDURE — 6360000002 HC RX W HCPCS: Performed by: STUDENT IN AN ORGANIZED HEALTH CARE EDUCATION/TRAINING PROGRAM

## 2021-02-17 PROCEDURE — 93242 EXT ECG>48HR<7D RECORDING: CPT | Performed by: INTERNAL MEDICINE

## 2021-02-17 PROCEDURE — 85025 COMPLETE CBC W/AUTO DIFF WBC: CPT

## 2021-02-17 PROCEDURE — 83735 ASSAY OF MAGNESIUM: CPT

## 2021-02-17 PROCEDURE — 2580000003 HC RX 258: Performed by: STUDENT IN AN ORGANIZED HEALTH CARE EDUCATION/TRAINING PROGRAM

## 2021-02-17 PROCEDURE — 94640 AIRWAY INHALATION TREATMENT: CPT

## 2021-02-17 RX ADMIN — HYDROCODONE BITARTRATE AND ACETAMINOPHEN 1 TABLET: 10; 325 TABLET ORAL at 03:22

## 2021-02-17 RX ADMIN — ASPIRIN 81 MG: 81 TABLET, CHEWABLE ORAL at 08:11

## 2021-02-17 RX ADMIN — HEPARIN SODIUM 5000 UNITS: 5000 INJECTION INTRAVENOUS; SUBCUTANEOUS at 06:43

## 2021-02-17 RX ADMIN — POLYETHYLENE GLYCOL 3350 17 G: 17 POWDER, FOR SOLUTION ORAL at 12:52

## 2021-02-17 RX ADMIN — ATORVASTATIN CALCIUM 80 MG: 80 TABLET, FILM COATED ORAL at 08:11

## 2021-02-17 RX ADMIN — CLOPIDOGREL BISULFATE 75 MG: 75 TABLET ORAL at 08:10

## 2021-02-17 RX ADMIN — LEVOTHYROXINE SODIUM 125 MCG: 125 TABLET ORAL at 08:11

## 2021-02-17 RX ADMIN — PREDNISONE 7.5 MG: 5 TABLET ORAL at 08:11

## 2021-02-17 RX ADMIN — ARFORMOTEROL TARTRATE 15 MCG: 15 SOLUTION RESPIRATORY (INHALATION) at 08:33

## 2021-02-17 RX ADMIN — BUDESONIDE INHALATION 500 MCG: 0.5 SUSPENSION RESPIRATORY (INHALATION) at 08:33

## 2021-02-17 RX ADMIN — Medication 10 ML: at 09:00

## 2021-02-17 RX ADMIN — AMLODIPINE BESYLATE 5 MG: 5 TABLET ORAL at 08:11

## 2021-02-17 NOTE — PLAN OF CARE
Problem: Skin Integrity:  Goal: Absence of new skin breakdown  Description: Absence of new skin breakdown  Outcome: Ongoing  Note: No new signs of breakdown. Patient able to turn and reposition self. Will continue to assess and monitor. Problem: Falls - Risk of:  Goal: Will remain free from falls  Description: Will remain free from falls  Outcome: Ongoing  Note: Patient will remain free from falls. Patient alert and orientated and uses call light appropriately. Patient up x2 with reggie nicholas, tolerates fairly well. Fall precautions in place. Bed locked and in lowest position, bed alarm on, nonskid socks on. Call light within reach.

## 2021-02-17 NOTE — PROGRESS NOTES
2 week Zio patch placed- pt doesn't appear to understand instructions- RN gave report to [facility] facility

## 2021-02-17 NOTE — PROGRESS NOTES
Speech Language Pathology    Reviewed chart. Spoke with RN, Glory Kelly. Attempted to see pt for therapy; pt too somnolent to participate, despite verbal/visual/tactile cues. Will plan to re-attempt later as able/appropriate.     Cecil Street M.A., Laureen [phone]  Speech-Language Pathologist  Pager: [phone]

## 2021-02-17 NOTE — PROGRESS NOTES
Activity Tolerance: Treatment limited secondary to decreased cognition;Treatment limited secondary to agitation  Activity Tolerance: Did not tolerate session well; no sign of SOB on 2L O2  Safety Devices  Safety Devices in place: Yes  Type of devices: Bed alarm in place;Call light within reach; Left in bed;Nurse notified         Patient Diagnosis(es): The primary encounter diagnosis was Acute ischemic stroke ([facility] Diagnoses of Chronic bilateral low back pain without sciatica and Pacemaker were also pertinent to this visit. has a past medical history of Arthritis, Bipolar depression ([facility] CAD (coronary artery disease), CHF (congestive heart failure) ([facility] COPD (chronic obstructive pulmonary disease) ([facility] Depression, Diabetes mellitus [facility] Encounter for imaging to screen for metal prior to MRI, Hyperlipidemia, Oxygen dependent, Pneumonia, Thyroid disease, and Unspecified cerebral artery occlusion with cerebral infarction. has a past surgical history that includes Cholecystectomy; Tubal ligation; Hysterectomy (1975); Tonsillectomy; Appendectomy; pacemaker placement; vascular surgery; Colonoscopy; other surgical history (5/28/13); Pacemaker insertion; Cardiac surgery; Cardiac surgery; Cardiac pacemaker placement; and Diagnostic Cardiac Cath Lab Procedure.     Restrictions  Restrictions/Precautions  Restrictions/Precautions: Up as Tolerated  Position Activity Restriction  Other position/activity restrictions: up as tolerated  Subjective   General  Chart Reviewed: Yes  Patient assessed for rehabilitation services?: Yes  Additional Pertinent Hx: Hx = COPD (wears 5L O2 at home, per chart); CHF, DM, CAD, HLD, thyroid disease, bipolar/depression, cardiac pacemaker  Response to previous treatment: Patient with no complaints from previous session  Family / Caregiver Present: No  Referring Practitioner: Danica Ford MD Diagnosis: 2/12 admit to ED, lives alone and family found her w/ AMS (speaking gibberish, not following commands). CT head found evolving right MCA territory infarct, no hemorrhage. Subjective  Subjective: Pt in bed on arrival sleeping soundly, arousing only to physical touch/sternal rub and falling back asleep. Pt moaning & unable to form words once aroused. Increased alertness at EOB. Patient Currently in Pain: Other (comment)(no s/s of pain)   Orientation  Orientation  Overall Orientation Status: Impaired(DANDRE 2/2 aphasia; pt seemed confused about person, place & situation)  Objective    ADL  Grooming: Dependent/Total(attempting to encourage pt to participate in combing hair; pt uninterested/not responding)  Toileting: (no incontinence this session)        Balance  Sitting Balance: Dependent/Total(pt requiring as much as Total A x1, demonstrating no righting response in any direction. Able to achieve moments of Min A when attending to task.  Slumps over, corrects posture <50% of time.)  Standing Balance  Time: sat EOB ~8 min  Activity: attempting combing hair; attending to window; reaching for therapist hands - pt not initiating participation in ADL tasks presented  Bed mobility  Rolling to Left: Dependent/Total(Total A x1, therapist facilitated pt's arm to bedrail, pt unable to help or grasp rail)  Rolling to Right: Dependent/Total(Total A x1, therapist facilitated pt's arm to bedrail, pt unable to help or grasp rail)  Supine to Sit: Dependent/Total(Total x1)  Sit to Supine: Dependent/Total;2 Person assistance(Total A: Max x2)  Scooting: Dependent/Total(Total Ax1, EOB)  Comment: required Total A for all bed mobility 2/2 decreased alertness & interest in participating                          Vision  Vision Comment: DANDRE 2/2 aphasia, but seems to be impaired: pt does not make eye contact & scans laterally for most of session; has difficulty attending to TV when cued  Cognition Short term goal 3: Pt will complete grooming ADL w/ appropriate use of objects, min verbal cues - not met 2/16  Patient Goals   Patient goals : none stated       Therapy Time   Individual Concurrent Group Co-treatment   Time In 1823         Time Out 1342         Minutes [address], s/OT  Manasa Dawn OTR/L #[id number]  Therapist was present, directed the patient's care, made skilled judgement, and was responsible for assessment and treatment of the patient.

## 2021-02-17 NOTE — CARE COORDINATION
Case Management Assessment            Discharge Note                    Date / Time of Note: 2/17/2021 12:55 PM                  Discharge Note Completed by: Megha Diana    Patient Name: Cherie Oconnor   YOB: 1945  Diagnosis: Acute ischemic stroke [facility]) [I63.9]   Date / Time: 2/13/2021  1:19 AM    Current PCP: Zoila Yan MD  Clinic patient: No    Hospitalization in the last 30 days: No    Advance Directives:  Code Status: Full Code  [place] DNR form completed and on chart: Not Indicated    Financial:  Payor: MEDICARE / Plan: MEDICARE PART A AND B / Product Type: *No Product type* /      Pharmacy:    [address] [place] [address] [phone] [facility] [phone]  [address] [place] [address]  Phone: [phone] Fax: [phone]      Assistance purchasing medications?:    Assistance provided by Case Management: None at this time    Does patient want to participate in local refill/ meds to beds program?:      Meds To Beds General Rules:  1. Can ONLY be done Monday- Friday between 8:30am-5pm  2. Prescription(s) must be in pharmacy by 3pm to be filled same day  3. Copy of patient's insurance/ prescription drug card and patient face sheet must be sent along with the prescription(s)  4. Cost of Rx cannot be added to hospital bill. If financial assistance is needed, please contact unit  or ;  or  CANNOT provide pharmacy voucher for patients co-pays  5.  Patients can then  the prescription on their way out of the hospital at discharge, or pharmacy can deliver to the bedside if staff is available. (payment due at time of pick-up or delivery - cash, check, or card accepted)     Able to afford home medications/ co-pay costs: Yes    ADLS:  Current PT AM-PAC Score: 14 /24  Current OT AM-PAC Score: 10 /24      DISCHARGE Disposition: Avtar Fajardo (SNF): [facility] at [place] Phone: [phone] Fax: [phone] LOC at discharge: Skilled  VICTORIANO Completed: Yes    Notification completed in HENS/PAS?:  Yes : CM has completed HENS online through secure website for SNF admission at [facility] [place]. Document ID #: [id number]    IMM Completed:   Yes, Case management has presented and reviewed IMM letter #2 to the patient and/or family/ POA. Patient and/or family/POA verbalized understanding of their medicare rights and appeal process if needed. Patient and/or family/POA has signed, initialed and placed today's date (2/17/2021) and time (100pm) on IMM letter #2 on the the appropriate lines. Patient and/or family/POA, copy of letter offered and they are aware that this original copy of IMM letter #2 is available prior to discharge from the paper chart on the unit. Electronic documentation has been entered into epic for IMM letter #2 and original paper copy has been added to the paper chart at the nurses station.      Transportation:  Transportation PLAN for discharge: EMS transportation   Mode of Transport: Ambulance stretcher - BLS  Reason for medical transport: Other: max assist, confusion, stroke  Name of [address]: [place] [address] Ambulance  Phone: [phone]  Time of Transport: 3pm    Transport form completed: Yes    Home Care:  [address] ordered at discharge: Not [address]: Not Applicable  Orders faxed: No    Durable Medical Equipment:  DME Provider: defer  Equipment obtained during hospitalization:     Home Oxygen and Respiratory Equipment:  Oxygen needed at discharge?: Not [address]: Not Applicable  Portable tank available for discharge?: Not Indicated    Dialysis:  Dialysis patient: No    Dialysis Center:  Not Applicable    Hospice Services:  Location: Not Applicable  Agency: Not Applicable    Consents signed: Not Indicated    Referrals made at [facility] for outpatient continued care:  Not Applicable    Additional CM Notes: [facility] Stated they can actually take patient today if needed. CM placed call to daughter Forrest Patel agreeable with discharge plan and denied any questions or concerns. Transport via [employer] with  at 3pm.  CM verified report, fax and transport time with DON/[facility]. No other needs from CM. [address] [place] [address]       Phone: [phone]       Fax: [phone]            The Plan for Transition of Care is related to the following treatment goals of Acute ischemic stroke [facility]) [I63.9]    The Patient and/or patient representative Ruthie Corona and her family were provided with a choice of provider and agrees with the discharge plan Yes    Freedom of choice list was provided with basic dialogue that supports the patient's individualized plan of care/goals and shares the quality data associated with the providers.  Yes    Care Transitions patient: No    Danica Velásquez RN  [facility]  Case Management Department  Ph: [phone]  Fax: [phone]

## 2021-02-17 NOTE — CARE COORDINATION
Patient here from home. Recs for placement. CM spoke with daughter Giuseppe Brasherr [phone]. [facility] [address] [address] are unable to accept. However, [facility] could take today. Amanda stated she is unsure if she wants her placed in [facility]. Will speak with her brother then follow back up with CM. CM explained that patient is ready for discharge so a discharge plan would need to be made. CM will continue to follow.   Evelyn Chopra RN  RN Case Manager  [phone]

## 2021-02-19 ENCOUNTER — TELEPHONE (OUTPATIENT)
Dept: CARDIOLOGY CLINIC | Age: 76
End: 2021-02-19

## 2021-02-19 NOTE — TELEPHONE ENCOUNTER
Patient was discharged from [address] [place] [address] on 02/17/21    Left with the Zio on and took it off prematurely,     Patient was confused and took this off herself    Wondering if they should  Return this and get another? She was seen by Dr. Jan New at [address] [place] [address] and has not yet been to the office.     Please advise, Scarlet Carter is the unit manager #[phone]

## 2021-02-22 NOTE — TELEPHONE ENCOUNTER
Attempted to leave message for pt in regards to monitor. Mailbox was full. We can mail one to her or she can come into office and have one put on.

## 2021-02-23 ENCOUNTER — TELEPHONE (OUTPATIENT)
Dept: CARDIOLOGY CLINIC | Age: 76
End: 2021-02-23

## 2021-03-11 PROCEDURE — 93244 EXT ECG>48HR<7D REV&INTERPJ: CPT | Performed by: INTERNAL MEDICINE

## 2021-03-24 DIAGNOSIS — I63.9 ACUTE ISCHEMIC STROKE (HCC): ICD-10-CM
